# Patient Record
Sex: FEMALE | Race: BLACK OR AFRICAN AMERICAN | Employment: OTHER | URBAN - METROPOLITAN AREA
[De-identification: names, ages, dates, MRNs, and addresses within clinical notes are randomized per-mention and may not be internally consistent; named-entity substitution may affect disease eponyms.]

---

## 2017-01-12 ENCOUNTER — OP HISTORICAL/CONVERTED ENCOUNTER (OUTPATIENT)
Dept: OTHER | Age: 57
End: 2017-01-12

## 2017-07-24 ENCOUNTER — OP HISTORICAL/CONVERTED ENCOUNTER (OUTPATIENT)
Dept: OTHER | Age: 57
End: 2017-07-24

## 2017-07-25 ENCOUNTER — OP HISTORICAL/CONVERTED ENCOUNTER (OUTPATIENT)
Dept: OTHER | Age: 57
End: 2017-07-25

## 2017-10-06 ENCOUNTER — IP HISTORICAL/CONVERTED ENCOUNTER (OUTPATIENT)
Dept: OTHER | Age: 57
End: 2017-10-06

## 2018-05-26 ENCOUNTER — IP HISTORICAL/CONVERTED ENCOUNTER (OUTPATIENT)
Dept: OTHER | Age: 58
End: 2018-05-26

## 2018-10-31 ENCOUNTER — OP HISTORICAL/CONVERTED ENCOUNTER (OUTPATIENT)
Dept: OTHER | Age: 58
End: 2018-10-31

## 2019-01-07 ENCOUNTER — OP HISTORICAL/CONVERTED ENCOUNTER (OUTPATIENT)
Dept: OTHER | Age: 59
End: 2019-01-07

## 2020-02-05 ENCOUNTER — IP HISTORICAL/CONVERTED ENCOUNTER (OUTPATIENT)
Dept: OTHER | Age: 60
End: 2020-02-05

## 2020-04-08 ENCOUNTER — OP HISTORICAL/CONVERTED ENCOUNTER (OUTPATIENT)
Dept: OTHER | Age: 60
End: 2020-04-08

## 2020-08-03 ENCOUNTER — OFFICE VISIT (OUTPATIENT)
Dept: OBGYN CLINIC | Age: 60
End: 2020-08-03
Payer: COMMERCIAL

## 2020-08-03 VITALS — HEIGHT: 63 IN

## 2020-08-03 DIAGNOSIS — Z00.00 ANNUAL VISIT FOR GENERAL ADULT MEDICAL EXAMINATION WITHOUT ABNORMAL FINDINGS: ICD-10-CM

## 2020-08-03 DIAGNOSIS — G80.9 CEREBRAL PALSY, UNSPECIFIED TYPE (HCC): Primary | ICD-10-CM

## 2020-08-03 DIAGNOSIS — Z12.31 VISIT FOR SCREENING MAMMOGRAM: Primary | ICD-10-CM

## 2020-08-03 PROCEDURE — 77067 SCR MAMMO BI INCL CAD: CPT | Performed by: OBSTETRICS & GYNECOLOGY

## 2020-08-03 PROCEDURE — 99386 PREV VISIT NEW AGE 40-64: CPT | Performed by: OBSTETRICS & GYNECOLOGY

## 2020-08-03 NOTE — PROGRESS NOTES
Veena Dickson is a 64 y.o. female who presents today for the following:  Chief Complaint   Patient presents with    Well Woman     Pt presents for annual exam with no complaints //MKeeley         Allergies   Allergen Reactions    Latex, Natural Rubber Itching     Per patient: \"just regular, no anaphylaxis\"      Betaseron [Interferon Beta-1b] Rash    Tysabri [Natalizumab] Hives       No current outpatient medications on file. No current facility-administered medications for this visit. Past Medical History:   Diagnosis Date    [facility] ([facility]        Past Surgical History:   Procedure Laterality Date    HX ANKLE FRACTURE TX      HX FEMUR FRACTURE TX      HX HYSTERECTOMY         Family History   Problem Relation Age of Onset    Hypertension Mother     Ovarian Cancer Mother     Hypertension Maternal Grandmother     Ovarian Cancer Maternal Grandmother     Hypertension Maternal Grandfather     Hypertension Paternal Grandmother     Hypertension Paternal Grandfather        Social History     Socioeconomic History    Marital status:      Spouse name: Not on file    Number of children: Not on file    Years of education: Not on file    Highest education level: Not on file   Occupational History    Not on file   Social Needs    Financial resource strain: Not on file    Food insecurity     Worry: Not on file     Inability: Not on file    Transportation needs     Medical: Not on file     Non-medical: Not on file   Tobacco Use    Smoking status: Never Smoker    Smokeless tobacco: Never Used   Substance and Sexual Activity    Alcohol use:  Yes    Drug use: Not Currently    Sexual activity: Not Currently     Partners: Male   Lifestyle    Physical activity     Days per week: Not on file     Minutes per session: Not on file    Stress: Not on file   Relationships    Social connections     Talks on phone: Not on file     Gets together: Not on file     Attends Oriental orthodox service: Not on file     Active member of club or organization: Not on file     Attends meetings of clubs or organizations: Not on file     Relationship status: Not on file    Intimate partner violence     Fear of current or ex partner: Not on file     Emotionally abused: Not on file     Physically abused: Not on file     Forced sexual activity: Not on file   Other Topics Concern    Not on file   Social History Narrative    Not on file         ROS   Review of Systems   Constitutional: Negative. HENT: Negative. Eyes: Negative. Respiratory: Negative. Cardiovascular: Negative. Gastrointestinal: Negative. Genitourinary: Negative. Musculoskeletal: Negative. Skin: Negative. Neurological: Negative. Endo/Heme/Allergies: Negative. Psychiatric/Behavioral: Negative. Ht 5' 3\" (1.6 m)    OBGyn Exam   Constitutional  · Appearance: well-nourished, well developed, alert, in no acute distress    HENT  · Head and Face: appears normal    Neck  · Inspection/Palpation: normal appearance, no masses or tenderness  · Lymph Nodes: no lymphadenopathy present  · Thyroid: gland size normal, nontender, no nodules or masses present on palpation    Breasts   Symmetric, no palpable masses, no tenderness, no skin changes, no nipple abnormality, no nipple discharge, no lymphadenopathy.     Chest  · Respiratory Effort: breathing labored  · Auscultation: normal breath sounds    Cardiovascular  · Heart:  · Auscultation: regular rate and rhythm without murmur    Gastrointestinal  · Abdominal Examination: abdomen non-tender to palpation, normal bowel sounds, no masses present  · Liver and spleen: no hepatomegaly present, spleen not palpable  · Hernias: no hernias identified    Genitourinary  · External Genitalia: normal appearance for age, no discharge present, no tenderness present, no inflammatory lesions present, no masses present, no atrophy present  · Vagina: normal vaginal vault without central or paravaginal defects, no discharge present, no inflammatory lesions present, no masses present  · Bladder: non-tender to palpation  · Urethra: absent  · Cervix: normal   · Uterus: absentAdnexa: no adnexal tenderness present, no adnexal masses present  · Perineum: perineum within normal limits, no evidence of trauma, no rashes or skin lesions present  · Anus: anus within normal limits, no hemorrhoids present  · Inguinal Lymph Nodes: no lymphadenopathy present    Skin  · General Inspection: no rash, no lesions identified    Neurologic/Psychiatric  · Mental Status:  · Orientation: grossly oriented to person, place and time  · Mood and Affect: mood normal, affect appropriate    No results found for this visit on 08/03/20. Orders Placed This Encounter    PAP IG, HPV AND RFX HPV 17/81,78([id number]) ([facility])     Order Specific Question:   Pap Source? Answer:   Vaginal     Order Specific Question:   Total Hysterectomy? Answer:   Yes     Order Specific Question:   Supracervical Hysterectomy? Answer:   No     Order Specific Question:   Post Menopausal?     Answer:   Yes     Order Specific Question:   Hormone Therapy? Answer:   No     Order Specific Question:   IUD? Answer:   No     Order Specific Question:   Abnormal Bleeding? Answer:   No     Order Specific Question:   Pregnant     Answer:   No     Order Specific Question:   Post Partum? Answer:   No         1. Cerebral palsy, unspecified type ([facility]      2. Annual visit for general adult medical examination without abnormal findings  Hx of hysterectomy for benign reason  - PAP IG, HPV AND RFX HPV 85/50,82([id number])  -mammo done today      Follow-up and Dispositions    · Return in about 1 year (around 8/3/2021).

## 2020-08-08 LAB
CYTOLOGIST CVX/VAG CYTO: NORMAL
CYTOLOGY CVX/VAG DOC CYTO: NORMAL
CYTOLOGY CVX/VAG DOC THIN PREP: NORMAL
DX ICD CODE: NORMAL
HPV I/H RISK 1 DNA CVX QL PROBE+SIG AMP: NEGATIVE
Lab: NORMAL
OTHER STN SPEC: NORMAL
STAT OF ADQ CVX/VAG CYTO-IMP: NORMAL

## 2020-08-31 ENCOUNTER — TELEPHONE (OUTPATIENT)
Dept: UROLOGY | Age: 60
End: 2020-08-31

## 2020-09-17 ENCOUNTER — HOSPITAL ENCOUNTER (EMERGENCY)
Age: 60
Discharge: HOME | End: 2020-09-18
Attending: EMERGENCY MEDICINE
Payer: COMMERCIAL

## 2020-09-17 VITALS
OXYGEN SATURATION: 97 % | BODY MASS INDEX: 31.89 KG/M2 | DIASTOLIC BLOOD PRESSURE: 78 MMHG | HEIGHT: 63 IN | SYSTOLIC BLOOD PRESSURE: 138 MMHG | WEIGHT: 180 LBS | RESPIRATION RATE: 18 BRPM | TEMPERATURE: 98.2 F | HEART RATE: 86 BPM

## 2020-09-17 DIAGNOSIS — N30.00 ACUTE CYSTITIS WITHOUT HEMATURIA: Primary | ICD-10-CM

## 2020-09-17 LAB
APPEARANCE UR: ABNORMAL
BACTERIA URNS QL MICRO: NEGATIVE /HPF
BILIRUB UR QL: NEGATIVE
COLOR UR: ABNORMAL
GLUCOSE UR STRIP.AUTO-MCNC: NEGATIVE MG/DL
HGB UR QL STRIP: ABNORMAL
KETONES UR QL STRIP.AUTO: NEGATIVE MG/DL
LEUKOCYTE ESTERASE UR QL STRIP.AUTO: ABNORMAL
NITRITE UR QL STRIP.AUTO: NEGATIVE
OTHER URINE MICRO,5051: PRESENT
PH UR STRIP: 6 [PH] (ref 5–8)
PROT UR STRIP-MCNC: 100 MG/DL
RBC #/AREA URNS HPF: >100 /HPF (ref 0–5)
SP GR UR REFRACTOMETRY: 1.01 (ref 1–1.03)
UA: UC IF INDICATED,UAUC: ABNORMAL
UROBILINOGEN UR QL STRIP.AUTO: 0.1 EU/DL (ref 0.1–1)
WBC URNS QL MICRO: >100 /HPF (ref 0–4)
YEAST URNS QL MICRO: PRESENT

## 2020-09-17 PROCEDURE — 99285 EMERGENCY DEPT VISIT HI MDM: CPT

## 2020-09-17 PROCEDURE — 87086 URINE CULTURE/COLONY COUNT: CPT

## 2020-09-17 PROCEDURE — 81001 URINALYSIS AUTO W/SCOPE: CPT

## 2020-09-17 PROCEDURE — 87077 CULTURE AEROBIC IDENTIFY: CPT

## 2020-09-17 PROCEDURE — 87186 SC STD MICRODIL/AGAR DIL: CPT

## 2020-09-17 PROCEDURE — 87147 CULTURE TYPE IMMUNOLOGIC: CPT

## 2020-09-17 RX ORDER — PHENAZOPYRIDINE HYDROCHLORIDE 200 MG/1
200 TABLET, FILM COATED ORAL 3 TIMES DAILY
Qty: 6 TAB | Refills: 0 | Status: SHIPPED | OUTPATIENT
Start: 2020-09-17 | End: 2020-09-19

## 2020-09-17 RX ORDER — CEPHALEXIN 500 MG/1
500 CAPSULE ORAL 4 TIMES DAILY
Qty: 28 CAP | Refills: 0 | Status: SHIPPED | OUTPATIENT
Start: 2020-09-17 | End: 2020-09-24

## 2020-09-17 NOTE — ED TRIAGE NOTES
Pt has hx of MS and is being treated for bladder infection, pt also c/o leg  Weakness more than nml starting yesterday

## 2020-09-17 NOTE — ED PROVIDER NOTES
EMERGENCY DEPARTMENT HISTORY AND PHYSICAL EXAM      Date: 9/17/2020  Patient Name: Remigio Arroyo    History of Presenting Illness     Chief Complaint   Patient presents with    Bladder Infection       History Provided By: Patient    HPI: Remigio Arroyo, 64 y.o. female with a past medical history significant Multiple sclerosis presents to the ED with cc of dysuria and symptoms and signs of a urinary tract infection. She says these of the same symptoms that she has had in the past and also produces some level of flare in her MS. She says she is seen is exhibited as difficulty walking with decreased strength. She specifically denies any fever, chills, nausea, vomiting, chest pain, shortness of breath, rash, diarrhea, headache, night sweats, weight loss. She has not treated her symptoms with anything but wants to be evaluated in the emergency room. There are no other complaints, changes, or physical findings at this time. PCP: Sussy Camp NP    No current facility-administered medications on file prior to encounter. Current Outpatient Medications on File Prior to Encounter   Medication Sig Dispense Refill    amitriptyline (ELAVIL) 25 mg tablet Take  by mouth nightly.  bethanechol (URECHOLINE) 25 mg tablet Take  by mouth Before breakfast, lunch, and dinner.  Dalfampridine-ER 12 HR (AMPYRA) 10 mg tablet Take  by mouth every twelve (12) hours.  gabapentin (NEURONTIN) 300 mg capsule Take 300 mg by mouth three (3) times daily.  naltrexone (DEPADE) 50 mg tablet Take 50 mg by mouth daily.  ocrelizumab (Ocrevus) 30 mg/mL soln injection by IntraVENous route.  vit A/vit C/vit E/zinc/copper (PRESERVISION AREDS PO) Take  by mouth.  tamsulosin (FLOMAX) 0.4 mg capsule Take 0.4 mg by mouth daily.  tiZANidine (ZANAFLEX) 2 mg tablet Take  by mouth.  ergocalciferol (ERGOCALCIFEROL) 1,250 mcg (50,000 unit) capsule Take 50,000 Units by mouth.          Past History     Past Medical History:  Past Medical History:   Diagnosis Date    MS (multiple sclerosis) ([facility]        Past Surgical History:  Past Surgical History:   Procedure Laterality Date    HX ANKLE FRACTURE TX      HX FEMUR FRACTURE TX      HX HYSTERECTOMY         Family History:  Family History   Problem Relation Age of Onset    Hypertension Mother     Ovarian Cancer Mother     Hypertension Maternal Grandmother     Ovarian Cancer Maternal Grandmother     Hypertension Maternal Grandfather     Hypertension Paternal Grandmother     Hypertension Paternal Grandfather        Social History:  Social History     Tobacco Use    Smoking status: Never Smoker    Smokeless tobacco: Never Used   Substance Use Topics    Alcohol use: Yes    Drug use: Not Currently       Allergies: Allergies   Allergen Reactions    Latex, Natural Rubber Itching     Per patient: \"just regular, no anaphylaxis\"      Betaseron [Interferon Beta-1b] Rash    Tysabri [Natalizumab] Hives         Review of Systems     Review of Systems   Constitutional: Negative. Negative for activity change, appetite change, chills, fatigue, fever and unexpected weight change. HENT: Negative. Negative for congestion, hearing loss, rhinorrhea, sneezing and voice change. Eyes: Negative. Negative for pain and visual disturbance. Respiratory: Negative. Negative for apnea, cough, choking, chest tightness and shortness of breath. Cardiovascular: Negative. Negative for chest pain and palpitations. Gastrointestinal: Negative. Negative for abdominal distention, abdominal pain, blood in stool, diarrhea, nausea and vomiting. Genitourinary: Positive for dysuria. Negative for difficulty urinating, flank pain, frequency and urgency. No discharge   Musculoskeletal: Negative. Negative for arthralgias, back pain, myalgias and neck stiffness. Skin: Negative. Negative for color change and rash. Neurological: Negative.   Negative for dizziness, seizures, syncope, speech difficulty, weakness, numbness and headaches. Hematological: Negative for adenopathy. Psychiatric/Behavioral: Negative. Negative for agitation, behavioral problems, dysphoric mood and suicidal ideas. The patient is not nervous/anxious. Physical Exam     Physical Exam  Vitals signs and nursing note reviewed. Constitutional:       General: She is not in acute distress. Appearance: She is well-developed. HENT:      Head: Normocephalic and atraumatic. Nose: Nose normal.      Mouth/Throat:      Mouth: Mucous membranes are moist.      Pharynx: Oropharynx is clear. No oropharyngeal exudate. Eyes:      General:         Right eye: No discharge. Left eye: No discharge. Conjunctiva/sclera: Conjunctivae normal.      Pupils: Pupils are equal, round, and reactive to light. Neck:      Musculoskeletal: Normal range of motion and neck supple. Cardiovascular:      Rate and Rhythm: Normal rate and regular rhythm. Chest Wall: PMI is not displaced. No thrill. Heart sounds: Normal heart sounds. No murmur. No friction rub. No gallop. Pulmonary:      Effort: Pulmonary effort is normal. No respiratory distress. Breath sounds: Normal breath sounds. No wheezing or rales. Chest:      Chest wall: No tenderness. Abdominal:      General: Bowel sounds are normal. There is no distension. Palpations: Abdomen is soft. There is no mass. Tenderness: There is no abdominal tenderness. There is no guarding or rebound. Musculoskeletal: Normal range of motion. Lymphadenopathy:      Cervical: No cervical adenopathy. Skin:     General: Skin is warm and dry. Capillary Refill: Capillary refill takes less than 2 seconds. Findings: No erythema or rash. Neurological:      Mental Status: She is alert and oriented to person, place, and time. Cranial Nerves: No cranial nerve deficit.       Coordination: Coordination normal. Psychiatric:         Mood and Affect: Mood normal.         Behavior: Behavior normal.         Diagnostic Study Results     Labs -     Recent Results (from the past 12 hour(s))   URINALYSIS W/ REFLEX CULTURE    Collection Time: 09/17/20  2:52 PM    Specimen: Urine   Result Value Ref Range    Color Yellow/Straw      Appearance Turbid (A) Clear      Specific gravity 1.008 1.003 - 1.030      pH (UA) 6.0 5.0 - 8.0      Protein 100 (A) Negative mg/dL    Glucose Negative Negative mg/dL    Ketone Negative Negative mg/dL    Bilirubin Negative Negative      Blood Large (A) Negative      Urobilinogen 0.1 0.1 - 1.0 EU/dL    Nitrites Negative Negative      Leukocyte Esterase Large (A) Negative      UA:UC IF INDICATED Urine Culture Ordered      WBC >100 (H) 0 - 4 /hpf    RBC >100 (H) 0 - 5 /hpf    Bacteria Negative Negative /hpf    PRESUMPTIVE YEAST Present      Other Urine Micro Present         Radiologic Studies -   @lastxrresult@  CT Results  (Last 48 hours)    None        CXR Results  (Last 48 hours)    None            Medical Decision Making   I am the first provider for this patient. I reviewed the vital signs, available nursing notes, past medical history, past surgical history, family history and social history. Vital Signs-Reviewed the patient's vital signs. Patient Vitals for the past 12 hrs:   Temp Pulse Resp BP SpO2   09/17/20 1817 -- 86 18 138/78 97 %   09/17/20 1521 98.2 °F (36.8 °C) 86 18 124/75 --   09/17/20 1359 98.2 °F (36.8 °C) 87 20 128/79 97 %       Records Reviewed: Nursing Notes    Provider Notes (Medical Decision Making):   Patient is having symptoms consistent with her dysuria from urinary tract infections. Will assess with UA and provide serial exams and follow-up. ED Course:   Initial assessment performed. The patients presenting problems have been discussed, and they are in agreement with the care plan formulated and outlined with them.   I have encouraged them to ask questions as they arise throughout their visit.       6:21 PM: Patient does have urinary tract infection will treat with Keflex as an outpatient along with Pyridium. Will have patient follow-up with PCP. PROCEDURES        PLAN:  1. Current Discharge Medication List      START taking these medications    Details   cephALEXin (Keflex) 500 mg capsule Take 1 Cap by mouth four (4) times daily for 7 days. Qty: 28 Cap, Refills: 0      phenazopyridine (Pyridium) 200 mg tablet Take 1 Tab by mouth three (3) times daily for 6 doses. Qty: 6 Tab, Refills: 0           2. Follow-up Information     Follow up With Specialties Details Why Contact Info    Genny Teague NP Nurse Practitioner In 2 days  [address] [facility] [place]   [phone]          Return to ED if worse     Diagnosis     Clinical Impression:   1.  Acute cystitis without hematuria

## 2020-09-19 LAB
COLONY COUNT,CNT: ABNORMAL
CULTURE,CULT: ABNORMAL
SPECIAL REQUESTS,SREQ: ABNORMAL

## 2020-09-23 LAB
COLONY COUNT,CNT: ABNORMAL
CULTURE,CULT: ABNORMAL
CULTURE,CULT: ABNORMAL
SPECIAL REQUESTS,SREQ: ABNORMAL

## 2020-10-21 LAB — CREATININE, EXTERNAL: 2.33

## 2020-11-13 ENCOUNTER — OFFICE VISIT (OUTPATIENT)
Dept: UROLOGY | Age: 60
End: 2020-11-13
Payer: COMMERCIAL

## 2020-11-13 VITALS
BODY MASS INDEX: 30.12 KG/M2 | WEIGHT: 170 LBS | HEART RATE: 88 BPM | HEIGHT: 63 IN | DIASTOLIC BLOOD PRESSURE: 77 MMHG | SYSTOLIC BLOOD PRESSURE: 128 MMHG

## 2020-11-13 DIAGNOSIS — R31.9 URINARY TRACT INFECTION WITH HEMATURIA, SITE UNSPECIFIED: Primary | ICD-10-CM

## 2020-11-13 DIAGNOSIS — N39.0 URINARY TRACT INFECTION WITH HEMATURIA, SITE UNSPECIFIED: Primary | ICD-10-CM

## 2020-11-13 LAB
BILIRUB UR QL STRIP: NEGATIVE
GLUCOSE UR-MCNC: NEGATIVE MG/DL
KETONES P FAST UR STRIP-MCNC: NEGATIVE MG/DL
PH UR STRIP: 8 [PH] (ref 4.6–8)
PROT UR QL STRIP: NORMAL
SP GR UR STRIP: 1.02 (ref 1–1.03)
UA UROBILINOGEN AMB POC: NORMAL (ref 0.2–1)
URINALYSIS CLARITY POC: CLEAR
URINALYSIS COLOR POC: YELLOW
URINE BLOOD POC: NORMAL
URINE LEUKOCYTES POC: NORMAL
URINE NITRITES POC: NEGATIVE

## 2020-11-13 PROCEDURE — 99215 OFFICE O/P EST HI 40 MIN: CPT | Performed by: UROLOGY

## 2020-11-13 PROCEDURE — 81003 URINALYSIS AUTO W/O SCOPE: CPT | Performed by: UROLOGY

## 2020-11-13 RX ORDER — NALTREXONE HYDROCHLORIDE 50 MG/1
TABLET, FILM COATED ORAL
COMMUNITY
Start: 2020-10-05 | End: 2021-03-23 | Stop reason: SDUPTHER

## 2020-11-13 NOTE — LETTER
11/13/20 Patient: Dequan Ca YOB: 1960 Date of Visit: 11/13/2020 [address], NP 
[address] [place] [address] VIA Facsimile: [phone] [facility] Thank you for referring Ms. Becky Jack to [place] [address] for evaluation. My notes for this consultation are attached. If you have questions, please do not hesitate to call me. I look forward to following your patient along with you. Sincerely, Jesus Lopez MD

## 2020-11-13 NOTE — PROGRESS NOTES
HPI ROS PE NOTE          History of Present Illness   Chief complaint: Follow-up for neurogenic bladder, chronic urinary retention, urgency incontinence  Jhonatan Dunn is a 64 y.o. female who presents with follow-up for urinary complications of multiple sclerosis. She has had recurring urinary tract infections mostly due to chronic urinary retention incomplete emptying. She also has urinary incontinence, urgency and type. Seen in the office earlier this year the patient was in urinary retention and had an indwelling Velazco catheter which was kept in place for several weeks. She has been on bethanechol chloride 50 mg 4 times daily, she was also found to have hydronephrosis with possible ureterovesical junction obstruction at the cause. Urethral dilation was performed at the time of her cystoscopy this  was corrected with the hope that this would improve her ability to empty her bladder. She has been recently seen by Dr. Bernardo Davis nephrologist noting increasing creatinine levels concerning for further development of renal function deterioration the patient was also recently seen in the emergency room September 17, 2020 due to urinary tract infection and yeast on cultures as well as Klebsiella. Has been on tamsulosin for detrusor sphincter dyssynergia and bethanechol chloride 50 mg 4 times daily to assist .  Not sure of the reasons for unwillingness of [facility] agencies to do intermittent caths; the patient had a multidrug-resistant ESBL urinary tract infection when in the hospital in February of this year.    Past Medical History:   Diagnosis Date    Arthritis     Burning with urination     MS ([facility] sclerosis) ([facility]     Nonneurogenic neurogenic bladder dysfunction       Past Surgical History:   Procedure Laterality Date    HX ANKLE FRACTURE TX      HX FEMUR FRACTURE TX      HX HYSTERECTOMY       Family History   Problem Relation Age of Onset    Hypertension Mother     Ovarian Cancer Mother  Hypertension Maternal Grandmother     Ovarian Cancer Maternal Grandmother     Hypertension Maternal Grandfather     Hypertension Paternal Grandmother     Hypertension Paternal Grandfather       Social History     Tobacco Use    Smoking status: Never Smoker    Smokeless tobacco: Never Used   Substance Use Topics    Alcohol use: Yes     Frequency: Monthly or less       Prior to Admission medications    Medication Sig Start Date End Date Taking? Authorizing Provider   calcium carb/vit D3/minerals (Calcium Carbonate-Vit D3-Min) 600 mg (1,500 mg)-200 unit chew  2/12/20  Yes Provider, Historical   vit C/E/Zn/coppr/lutein/zeaxan (PRESERVISION AREDS-2 PO)  2/26/20  Yes Provider, Historical   naltrexone (DEPADE) 50 mg tablet TAKE ONE CAPSULE (4.5mg) BY MOUTH DAILY AT BEDTIME as directed 10/5/20  Yes Provider, Historical   bethanechol (URECHOLINE) 25 mg tablet Take  by mouth Before breakfast, lunch, and dinner. Yes Provider, Historical   Dalfampridine-ER 12 HR (AMPYRA) 10 mg tablet Take  by mouth every twelve (12) hours. Yes Provider, Historical   gabapentin (NEURONTIN) 300 mg capsule Take 300 mg by mouth three (3) times daily. Yes Provider, Historical   ocrelizumab (Ocrevus) 30 mg/mL soln injection by IntraVENous route. Yes Provider, Historical   vit A/vit C/vit E/zinc/copper (PRESERVISION AREDS PO) Take  by mouth. Yes Provider, Historical   tamsulosin (FLOMAX) 0.4 mg capsule Take 0.4 mg by mouth daily. Yes Provider, Historical   tiZANidine (ZANAFLEX) 2 mg tablet Take  by mouth. Yes Provider, Historical   ergocalciferol (ERGOCALCIFEROL) 1,250 mcg (50,000 unit) capsule Take 50,000 Units by mouth.    Yes Provider, Historical     Allergies   Allergen Reactions    Latex, Natural Rubber Itching     Per patient: \"just regular, no anaphylaxis\"      Betaseron [Interferon Beta-1b] Rash    Tysabri [Natalizumab] Hives        Review of Systems:  Constitutional: positive for fatigue and malaise  Respiratory: negative  Cardiovascular: negative  Gastrointestinal: positive for constipation  Genitourinary:positive for frequency, nocturia, urinary incontinence and recurrent nfections  Musculoskeletal:positive for myalgias and muscle weakness  Neurological: positive for paresthesia, gait problems and weakness     Physical Exam     Physical Exam:   Visit Vitals  /77   Pulse 88   Ht 5' 3\" (1.6 m)   Wt 170 lb (77.1 kg)   BMI 30.11 kg/m²     General appearance: alert, cooperative, mild distress, appears stated age, mildly obese  Head: Normocephalic, without obvious abnormality, atraumatic  Lungs: clear to auscultation bilaterally  Heart: regular rate and rhythm, S1, S2 normal, no murmur, click, rub or gallop  Abdomen: soft, non-tender. Bowel sounds normal. No masses,  no organomegaly  Extremities: extremities normal, atraumatic, no cyanosis or edema  Skin: Skin color, texture, turgor normal. No rashes or lesions  Neurologic: Sensory: reduced ; superficial pain present, proprioceptive sense reduced, pressure sense reduced  Motor:poor hip and thighs  and quadriceps femoris bilaterally  Coordination: reduced  Gait: Abnormal difficulty walking    Data Review:   Recent Results (from the past 48 hour(s))   AMB POC URINALYSIS DIP STICK AUTO W/O MICRO    Collection Time: 11/13/20 10:42 AM   Result Value Ref Range    Color (UA POC) Yellow     Clarity (UA POC) Clear     Glucose (UA POC) Negative Negative    Bilirubin (UA POC) Negative Negative    Ketones (UA POC) Negative Negative    Specific gravity (UA POC) 1.020 1.001 - 1.035    Blood (UA POC)      pH (UA POC) 8.0 4.6 - 8.0    Protein (UA POC) 3+ Negative    Urobilinogen (UA POC) 0.2 mg/dL 0.2 - 1    Nitrites (UA POC) Negative Negative    Leukocyte esterase (UA POC)       No results for input(s): 48 in the last 72 hours.       Assessment and Plan:   Neurogenic bladder, hypotonic , with chronic urinary retention, bilateral hydronephrosis on ultrasound:previously determined ureterovescial junction obstruction  elevated creatinine :LUCILA /CKD recommend repeat cystoscopy , possible repeat urethral dilation, retrograde pyelograms: attempt to place ureteral stents to overcome UVJ obstruction. Patient to schedule this procedure after today's office visit   Recurrent/chronic urinary tract infection, repeat culture and sensitivity from today's specimen, antibiotic therapy based on results  Chronic Kidney disease acute kidney injury GFR 26 cc/min  Office visit today was a 60-minute face to face extended visit with review of extensive records from Dr. Fahad Flood as well as the previous hospitalizations ultrasounds and previous urinary x-rays as well as laboratory reports emergency room reports; physical examination discussion of need for intermittent catheterization to relieve urinary retention and attempts to obtain home health support for this treatment. Finally resulting in decision to perform repeat cystoscopy to attempt to resolve her hydronephrosis apparently secondary to ureterovesical junction obstruction with the hope that stents may be able to be placed; this may be a technically difficult undertaking. Ms. Roel Basilio has a reminder for a \"due or due soon\" health maintenance. I have asked that she contact her primary care provider for follow-up on this health maintenance. Lorena Gibson M.D.  11/13/2020

## 2020-11-17 LAB
APPEARANCE UR: ABNORMAL
BACTERIA #/AREA URNS HPF: ABNORMAL /[HPF]
BACTERIA UR CULT: ABNORMAL
BACTERIA UR CULT: ABNORMAL
BILIRUB UR QL STRIP: NEGATIVE
CASTS URNS QL MICRO: ABNORMAL /LPF
COLOR UR: YELLOW
EPI CELLS #/AREA URNS HPF: ABNORMAL /HPF (ref 0–10)
GLUCOSE UR QL: NEGATIVE
HGB UR QL STRIP: ABNORMAL
KETONES UR QL STRIP: NEGATIVE
LEUKOCYTE ESTERASE UR QL STRIP: ABNORMAL
MICRO URNS: ABNORMAL
NITRITE UR QL STRIP: POSITIVE
PH UR STRIP: 6 [PH] (ref 5–7.5)
PROT UR QL STRIP: ABNORMAL
RBC #/AREA URNS HPF: >30 /HPF (ref 0–2)
SP GR UR: 1.01 (ref 1–1.03)
UROBILINOGEN UR STRIP-MCNC: 0.2 MG/DL (ref 0.2–1)
WBC #/AREA URNS HPF: >30 /HPF (ref 0–5)

## 2020-11-17 RX ORDER — NITROFURANTOIN 25; 75 MG/1; MG/1
100 CAPSULE ORAL 2 TIMES DAILY
Qty: 20 CAP | Refills: 0 | Status: SHIPPED | OUTPATIENT
Start: 2020-11-17 | End: 2020-11-27

## 2020-12-03 ENCOUNTER — HOSPITAL ENCOUNTER (OUTPATIENT)
Dept: PREADMISSION TESTING | Age: 60
Discharge: HOME | End: 2020-12-03
Payer: COMMERCIAL

## 2020-12-03 VITALS
SYSTOLIC BLOOD PRESSURE: 122 MMHG | HEIGHT: 63 IN | OXYGEN SATURATION: 99 % | BODY MASS INDEX: 30.12 KG/M2 | HEART RATE: 87 BPM | DIASTOLIC BLOOD PRESSURE: 76 MMHG | WEIGHT: 170 LBS | RESPIRATION RATE: 18 BRPM | TEMPERATURE: 98.6 F

## 2020-12-03 LAB
ANION GAP SERPL CALC-SCNC: 5 MMOL/L (ref 5–15)
BUN SERPL-MCNC: 22 MG/DL (ref 6–20)
BUN/CREAT SERPL: 12 (ref 12–20)
CA-I BLD-MCNC: 9.3 MG/DL (ref 8.5–10.1)
CHLORIDE SERPL-SCNC: 104 MMOL/L (ref 97–108)
CO2 SERPL-SCNC: 30 MMOL/L (ref 21–32)
CREAT SERPL-MCNC: 1.86 MG/DL (ref 0.55–1.02)
GLUCOSE SERPL-MCNC: 99 MG/DL (ref 65–100)
HCT VFR BLD AUTO: 28.4 % (ref 35–47)
HGB BLD-MCNC: 8.4 G/DL (ref 11.5–16)
POTASSIUM SERPL-SCNC: 4 MMOL/L (ref 3.5–5.1)
SODIUM SERPL-SCNC: 139 MMOL/L (ref 136–145)

## 2020-12-03 PROCEDURE — 85018 HEMOGLOBIN: CPT

## 2020-12-03 PROCEDURE — 87635 SARS-COV-2 COVID-19 AMP PRB: CPT

## 2020-12-03 PROCEDURE — 80048 BASIC METABOLIC PNL TOTAL CA: CPT

## 2020-12-03 PROCEDURE — 36415 COLL VENOUS BLD VENIPUNCTURE: CPT

## 2020-12-03 RX ORDER — NITROFURANTOIN (MACROCRYSTALS) 100 MG/1
100 CAPSULE ORAL 2 TIMES DAILY
COMMUNITY
End: 2020-12-07

## 2020-12-03 RX ORDER — AMITRIPTYLINE HYDROCHLORIDE 25 MG/1
25 TABLET, FILM COATED ORAL
COMMUNITY

## 2020-12-03 RX ORDER — SENNOSIDES 8.6 MG/1
1 TABLET ORAL DAILY
COMMUNITY

## 2020-12-04 ENCOUNTER — ANESTHESIA EVENT (OUTPATIENT)
Dept: SURGERY | Age: 60
End: 2020-12-04
Payer: COMMERCIAL

## 2020-12-04 LAB — SARS-COV-2, COV2: NORMAL

## 2020-12-04 NOTE — PROGRESS NOTES
Called to DR BREWER [facility] office left message regarding  Creatine 1.86  [id number]  Hgb 8.4  Hct 28.4  Eliud reviewed labs no new orders at this time.

## 2020-12-05 LAB — SARS-COV-2, COV2NT: NOT DETECTED

## 2020-12-07 ENCOUNTER — ANESTHESIA (OUTPATIENT)
Dept: SURGERY | Age: 60
End: 2020-12-07
Payer: COMMERCIAL

## 2020-12-07 ENCOUNTER — HOSPITAL ENCOUNTER (OUTPATIENT)
Dept: GENERAL RADIOLOGY | Age: 60
Discharge: HOME | End: 2020-12-07
Attending: UROLOGY

## 2020-12-07 ENCOUNTER — HOSPITAL ENCOUNTER (OUTPATIENT)
Age: 60
Discharge: HOME | End: 2020-12-07
Attending: UROLOGY | Admitting: UROLOGY
Payer: COMMERCIAL

## 2020-12-07 VITALS
DIASTOLIC BLOOD PRESSURE: 74 MMHG | HEART RATE: 73 BPM | RESPIRATION RATE: 18 BRPM | OXYGEN SATURATION: 100 % | TEMPERATURE: 97.3 F | SYSTOLIC BLOOD PRESSURE: 113 MMHG

## 2020-12-07 PROBLEM — N13.30 HYDRONEPHROSIS: Status: ACTIVE | Noted: 2020-12-07

## 2020-12-07 PROCEDURE — 74011000250 HC RX REV CODE- 250: Performed by: NURSE ANESTHETIST, CERTIFIED REGISTERED

## 2020-12-07 PROCEDURE — 76210000063 HC OR PH I REC FIRST 0.5 HR: Performed by: UROLOGY

## 2020-12-07 PROCEDURE — 74011250636 HC RX REV CODE- 250/636: Performed by: NURSE ANESTHETIST, CERTIFIED REGISTERED

## 2020-12-07 PROCEDURE — 74011250636 HC RX REV CODE- 250/636: Performed by: UROLOGY

## 2020-12-07 PROCEDURE — 76000 FLUOROSCOPY <1 HR PHYS/QHP: CPT

## 2020-12-07 PROCEDURE — C1769 GUIDE WIRE: HCPCS | Performed by: UROLOGY

## 2020-12-07 PROCEDURE — 77030020061 HC IV BLD WRMR ADMIN SET 3M -B: Performed by: ANESTHESIOLOGY

## 2020-12-07 PROCEDURE — 87186 SC STD MICRODIL/AGAR DIL: CPT

## 2020-12-07 PROCEDURE — 76060000034 HC ANESTHESIA 1.5 TO 2 HR: Performed by: UROLOGY

## 2020-12-07 PROCEDURE — C1758 CATHETER, URETERAL: HCPCS | Performed by: UROLOGY

## 2020-12-07 PROCEDURE — 76210000026 HC REC RM PH II 1 TO 1.5 HR: Performed by: UROLOGY

## 2020-12-07 PROCEDURE — 87077 CULTURE AEROBIC IDENTIFY: CPT

## 2020-12-07 PROCEDURE — C2617 STENT, NON-COR, TEM W/O DEL: HCPCS | Performed by: UROLOGY

## 2020-12-07 PROCEDURE — 76010000153 HC OR TIME 1.5 TO 2 HR: Performed by: UROLOGY

## 2020-12-07 PROCEDURE — 87086 URINE CULTURE/COLONY COUNT: CPT

## 2020-12-07 PROCEDURE — 2709999900 HC NON-CHARGEABLE SUPPLY: Performed by: UROLOGY

## 2020-12-07 PROCEDURE — 77030010509 HC AIRWY LMA MSK TELE -A: Performed by: ANESTHESIOLOGY

## 2020-12-07 PROCEDURE — 74011000636 HC RX REV CODE- 636: Performed by: UROLOGY

## 2020-12-07 PROCEDURE — 74011000250 HC RX REV CODE- 250: Performed by: UROLOGY

## 2020-12-07 PROCEDURE — 74011000272 HC RX REV CODE- 272: Performed by: UROLOGY

## 2020-12-07 DEVICE — STENT URET 6FR L24CM DBL PIGTAILS LUBRICIOUS COAT TAPR TIP: Type: IMPLANTABLE DEVICE | Site: URETER | Status: FUNCTIONAL

## 2020-12-07 RX ORDER — LIDOCAINE HYDROCHLORIDE 20 MG/ML
INJECTION, SOLUTION EPIDURAL; INFILTRATION; INTRACAUDAL; PERINEURAL AS NEEDED
Status: DISCONTINUED | OUTPATIENT
Start: 2020-12-07 | End: 2020-12-07 | Stop reason: HOSPADM

## 2020-12-07 RX ORDER — SODIUM CHLORIDE 9 MG/ML
INJECTION, SOLUTION INTRAVENOUS
Status: DISCONTINUED | OUTPATIENT
Start: 2020-12-07 | End: 2020-12-07 | Stop reason: HOSPADM

## 2020-12-07 RX ORDER — WATER 1 ML/ML
IRRIGANT IRRIGATION AS NEEDED
Status: DISCONTINUED | OUTPATIENT
Start: 2020-12-07 | End: 2020-12-07 | Stop reason: HOSPADM

## 2020-12-07 RX ORDER — SODIUM CHLORIDE 9 MG/ML
25 INJECTION, SOLUTION INTRAVENOUS CONTINUOUS
Status: DISCONTINUED | OUTPATIENT
Start: 2020-12-07 | End: 2020-12-07 | Stop reason: HOSPADM

## 2020-12-07 RX ORDER — FENTANYL CITRATE 50 UG/ML
50 INJECTION, SOLUTION INTRAMUSCULAR; INTRAVENOUS
Status: CANCELLED | OUTPATIENT
Start: 2020-12-07

## 2020-12-07 RX ORDER — PROPOFOL 10 MG/ML
INJECTION, EMULSION INTRAVENOUS AS NEEDED
Status: DISCONTINUED | OUTPATIENT
Start: 2020-12-07 | End: 2020-12-07 | Stop reason: HOSPADM

## 2020-12-07 RX ORDER — LABETALOL HCL 20 MG/4 ML
10 SYRINGE (ML) INTRAVENOUS
Status: DISCONTINUED | OUTPATIENT
Start: 2020-12-07 | End: 2020-12-07 | Stop reason: HOSPADM

## 2020-12-07 RX ORDER — LEVOFLOXACIN 5 MG/ML
500 INJECTION, SOLUTION INTRAVENOUS ONCE
Status: COMPLETED | OUTPATIENT
Start: 2020-12-07 | End: 2020-12-07

## 2020-12-07 RX ORDER — SODIUM CHLORIDE 9 MG/ML
1000 INJECTION, SOLUTION INTRAVENOUS CONTINUOUS
Status: CANCELLED | OUTPATIENT
Start: 2020-12-07

## 2020-12-07 RX ORDER — SODIUM CHLORIDE 0.9 % (FLUSH) 0.9 %
5-40 SYRINGE (ML) INJECTION EVERY 8 HOURS
Status: CANCELLED | OUTPATIENT
Start: 2020-12-07

## 2020-12-07 RX ORDER — METOPROLOL TARTRATE 5 MG/5ML
2.5 INJECTION INTRAVENOUS
Status: DISCONTINUED | OUTPATIENT
Start: 2020-12-07 | End: 2020-12-07 | Stop reason: HOSPADM

## 2020-12-07 RX ORDER — MIDAZOLAM HYDROCHLORIDE 1 MG/ML
INJECTION, SOLUTION INTRAMUSCULAR; INTRAVENOUS AS NEEDED
Status: DISCONTINUED | OUTPATIENT
Start: 2020-12-07 | End: 2020-12-07 | Stop reason: HOSPADM

## 2020-12-07 RX ORDER — ONDANSETRON 2 MG/ML
4 INJECTION INTRAMUSCULAR; INTRAVENOUS AS NEEDED
Status: CANCELLED | OUTPATIENT
Start: 2020-12-07

## 2020-12-07 RX ORDER — LIDOCAINE HYDROCHLORIDE 20 MG/ML
JELLY TOPICAL AS NEEDED
Status: DISCONTINUED | OUTPATIENT
Start: 2020-12-07 | End: 2020-12-07 | Stop reason: HOSPADM

## 2020-12-07 RX ORDER — LEVOFLOXACIN 250 MG/1
250 TABLET ORAL DAILY
Qty: 5 TAB | Refills: 0 | Status: SHIPPED | OUTPATIENT
Start: 2020-12-07 | End: 2020-12-12

## 2020-12-07 RX ORDER — SODIUM CHLORIDE 0.9 % (FLUSH) 0.9 %
5-40 SYRINGE (ML) INJECTION AS NEEDED
Status: CANCELLED | OUTPATIENT
Start: 2020-12-07

## 2020-12-07 RX ORDER — HYDROMORPHONE HYDROCHLORIDE 1 MG/ML
0.5 INJECTION, SOLUTION INTRAMUSCULAR; INTRAVENOUS; SUBCUTANEOUS
Status: CANCELLED | OUTPATIENT
Start: 2020-12-07

## 2020-12-07 RX ORDER — FENTANYL CITRATE 50 UG/ML
INJECTION, SOLUTION INTRAMUSCULAR; INTRAVENOUS AS NEEDED
Status: DISCONTINUED | OUTPATIENT
Start: 2020-12-07 | End: 2020-12-07 | Stop reason: HOSPADM

## 2020-12-07 RX ADMIN — LEVOFLOXACIN 500 MG: 5 INJECTION, SOLUTION INTRAVENOUS at 11:51

## 2020-12-07 RX ADMIN — MIDAZOLAM HYDROCHLORIDE 1 MG: 2 INJECTION, SOLUTION INTRAMUSCULAR; INTRAVENOUS at 11:30

## 2020-12-07 RX ADMIN — SODIUM CHLORIDE 25 ML/HR: 9 INJECTION, SOLUTION INTRAVENOUS at 10:30

## 2020-12-07 RX ADMIN — FENTANYL CITRATE 25 MCG: 50 INJECTION, SOLUTION INTRAMUSCULAR; INTRAVENOUS at 12:39

## 2020-12-07 RX ADMIN — LIDOCAINE HYDROCHLORIDE 100 MG: 20 INJECTION, SOLUTION EPIDURAL; INFILTRATION; INTRACAUDAL; PERINEURAL at 11:40

## 2020-12-07 RX ADMIN — PROPOFOL 150 MG: 10 INJECTION, EMULSION INTRAVENOUS at 11:40

## 2020-12-07 RX ADMIN — SODIUM CHLORIDE: 9 INJECTION, SOLUTION INTRAVENOUS at 11:30

## 2020-12-07 NOTE — ANESTHESIA POSTPROCEDURE EVALUATION
Procedure(s):  Cystourethroscopy, Urethral Dilation, Bilateral Retrograde Pyelogram  Bilateral Ureteral Stent Placement.     general    Anesthesia Post Evaluation      Multimodal analgesia: multimodal analgesia used between 6 hours prior to anesthesia start to PACU discharge  Patient location during evaluation: PACU  Patient participation: complete - patient participated  Level of consciousness: awake  Pain score: 0  Pain management: adequate  Airway patency: patent  Anesthetic complications: no  Cardiovascular status: acceptable  Respiratory status: acceptable  Hydration status: acceptable  Post anesthesia nausea and vomiting:  controlled  Final Post Anesthesia Temperature Assessment:  Normothermia (36.0-37.5 degrees C)      INITIAL Post-op Vital signs:   Vitals Value Taken Time   /85 12/7/2020  1:25 PM   Temp 36.4 °C (97.6 °F) 12/7/2020  1:15 PM   Pulse 71 12/7/2020  1:25 PM   Resp 14 12/7/2020  1:25 PM   SpO2 100 % 12/7/2020  1:25 PM

## 2020-12-07 NOTE — PROGRESS NOTES
Pt given discharged education and gave verbal understanding. Patient had own wheelchair and exited in personal wheelchair to front entrance of hospital to ride's car. Patient stable and no s/s infection.

## 2020-12-07 NOTE — BRIEF OP NOTE
Brief Postoperative Note    Patient: Elyssa Infante  YOB: 1960  MRN: [id number]    Date of Procedure: 12/7/2020     Pre-Op Diagnosis: Hydronephrosis, unspecified hydronephrosis type [N13.30]chronic urinary retention(R33.8;) CKD3) cnronicUTI    Post-Op Diagnosis: Same as preoperative diagnosis. and urethral stenosis      Procedure(s):  Cystourethroscopy, Urethral Dilation, Bilateral Retrograde Pyelogram  Bilateral Ureteral Stent Placement    Surgeon(s):  MD Osmin Sal MD    Surgical Assistant: Surg Asst-1: Kayleen Gillespie    Anesthesia: General     Estimated Blood Loss (mL): Minimal    Complications: None    Specimens:   ID Type Source Tests Collected by Time Destination   1 : Urine culture Urine Bladder CULTURE, URINE Colleen Salmeron MD 12/7/2020 1108 Microbiology        Implants:   Implant Name Type Inv. Item Serial No.  Lot No. LRB No. Used Action   STENT URET 6FR L24CM DBL PIG[employer] LU[employer] COAT TAPR TIP - SN/A  STENT URET 6FR L24CM DBL PIGTAILS LUBRICIOUS COAT TAPR TIP N/A [employer]_WD [id number] Left 1 Implanted   STENT URET 6FR L24CM DBL PIGTAILS LUBRICIOUS COAT TAPR TIP - SN/A  STENT URET 6FR L24CM DBL PIGTAILS LUBRICIOUS COAT TAPR TIP N/A [employer]_WD [id number] Right 1 Implanted       Drains: none     Findings: Mild urethral stenosis; 22 Fr, dilated to 38Fr.  Large residual urine, debris in bladder, chronic inflammation of bladder; C&S taken; bilateral hydroureteronephosis; bilateral ureteral stent stent placment  Electronically Signed by Jv Ramirez MD on 12/7/2020 at 1:13 PM

## 2020-12-07 NOTE — ANESTHESIA PREPROCEDURE EVALUATION
Relevant Problems   No relevant active problems       Anesthetic History   No history of anesthetic complications            Review of Systems / Medical History  Patient summary reviewed, nursing notes reviewed and pertinent labs reviewed    Pulmonary  Within defined limits                 Neuro/Psych         Neuromuscular disease    Comments: Multiple Sclerosis Cardiovascular  Within defined limits                     GI/Hepatic/Renal     GERD          Comments: Bladder dysfunction Endo/Other        Obesity and arthritis     Other Findings              Physical Exam    Airway  Mallampati: I  TM Distance: 4 - 6 cm  Neck ROM: normal range of motion   Mouth opening: Normal     Cardiovascular    Rhythm: regular  Rate: normal         Dental    Dentition: Lower partial plate and Upper partial plate     Pulmonary  Breath sounds clear to auscultation               Abdominal  GI exam deferred       Other Findings            Anesthetic Plan    ASA: 3  Anesthesia type: general          Induction: Intravenous  Anesthetic plan and risks discussed with: Patient

## 2020-12-07 NOTE — PROGRESS NOTES
Called Anesthesia, Dr. Karlee Easton and updated on pt temperature. Temperature is now 97.3 oral and pt can be taken off bearhugger and discharged per protocol and Anesthesia.

## 2020-12-07 NOTE — PROGRESS NOTES
Dr. Edilma Toscano notified of patient temperature  94.1 oral route and bear hugger applied at [phone]. Will continue to monitor.

## 2020-12-08 NOTE — OP NOTES
[facility]  OPERATIVE REPORT    Name:  Earl Sena  MR#:  [id number]  :  1960  ACCOUNT #:  [de-identified]  DATE OF SERVICE:  2020    HISTORY:  This is a 57-year-old female with a neurogenic bladder secondary to multiple sclerosis, who has chronic urinary tract infections, previous history of an identified ureterovesical junction obstruction with hydronephrosis, chronic kidney disease stage III with GFR of 34, recurrent and chronic urinary tract infection secondary to chronic urinary retention. She has a hypotonic bladder related to her multiple sclerosis. She is undergoing cystourethroscopy, urethral dilation, retrograde pyelograms, and stent placement. These procedures to address all of these issues. PREOPERATIVE DIAGNOSIS:  bilateral hydronephrosis, hypotonic bladder urinary retention, bladeer outlet obstruction    POSTOPERATIVE DIAGNOSIS:  CATA + urethral stenosis. PROCEDURE PERFORMED:  Cystourethroscopy, urethral dilation, bilateral retrograde pyelograms, and bilateral ureteral stent placements. SURGEON:  Jennifer Sellers MD    ASSISTANT:  Johan Gamez MD    ANESTHESIA:  General.    COMPLICATIONS:  None. SPECIMENS REMOVED:  Culture and sensitivity of the urine. IMPLANTS:  Bilateral 6-Indonesian x 24 cm double-J ureteral stents. DRAINS:  None. ESTIMATED BLOOD LOSS:  Negligible. PROCEDURE:  With the patient in the lithotomy position under satisfactory general anesthesia, sterile prep with chlorhexidine, drape in usual fashion for an endoscopic procedure was carried out. The urethra was calibrated and found to be narrowed at 22-24 Shelba Hang. Dilation to 38-Indonesian was performed with Yeny Person dilators. A large residual urine of approximately 200-300 mL was drained and specimen sent for culture and sensitivity. The 21. 5-Indonesian cystoscope was entered into the bladder and examination of the bladder revealed a bit of debris in the bladder present from chronic non-emptying. This was washed out, irrigated with several bladder fills of sterile water. Once the debris had been cleaned out, visual examination was carried out of the entire bladder surface with the 70-degree and 30-degree lenses. Chronic erythema present, but no neoplastic lesions were seen nor were there any stones seen. After completion of the visual examination, the ureters were subtle and difficult to see on the trigone. However, with persistence these openings were identified and bilateral occlusive bulb pyelograms were performed. These demonstrated bilateral hydroureteronephrosis with dilation down to the ureterovesical junction. Cannulation of the ureters was difficult, at least partially due to difficulty in visualizing them. After the retrogrades were completed demonstrating bilateral hydronephrosis, with great difficulty, ureteral stents were able to be placed. There was difficulty in accessing the right ureteral orifice. The left ureteral orifice was also difficult, but with persistence, a guidewire was able to be passed of 0.035 to the renal pelvis after which a 6-[place] Islander x 24 cm stent was placed with x-ray showing good position. Repeated attempts on the right side were unsuccessful in obtaining passage up the ureter with the guidewire. Assistance was then sought from Dr. Zohra Randall, who assisted with the use of the camera and was able to thread the guidewire to the renal pelvis after which the stent was placed. The bladder was then drained. Scopes were withdrawn. 2% lidocaine gel was instilled in the urethra for postop analgesia. The patient received 500 mg of Levaquin preoperatively for antibiotic surgical prophylaxis. She will take Levaquin 250 mg daily for 5 days postop. After completion of the x-rays and stent placement, the patient was returned to the recovery room in satisfactory condition having tolerated the procedure well.     FINAL DIAGNOSES:  Chronic urinary retention, hypotonic bladder, bladder outlet obstruction secondary to urethral stenosis, bilateral hydroureteronephrosis with chronic kidney disease stage III, acute kidney injury superimposed on chronic kidney disease, urethral stenosis. RADIOGRAPHIC READING    INDICATIONS FOR RETROGRADE PYELOGRAPHY:  History of renal insufficiency, chronic urinary tract infections, and urinary incontinence along with urinary infections. FINDINGS:  Preliminary x-rays of the abdomen and pelvis showed normal bony structures of the pelvis and lumbar spine. All soft tissues were found to be normal.  There were no calcifications seen along the course of the urinary tract. Following injection of contrast material, the ureters were normal in course and caliber. The renal pelves, infundibula, and calyces were normal bilaterally with no filling defects, stones, or renal masses indenting the collecting systems. IMPRESSION:  Normal retrograde pyelograms.       Isabella Johnson MD      [id number]  D:  12/07/2020 13:43  T:  12/07/2020 23:08  JOB #:  [id number]  CC:  Antonia Alonzo MD

## 2020-12-10 LAB
BACTERIA SPEC CULT: ABNORMAL
COLONY COUNT,CNT: ABNORMAL
SPECIAL REQUESTS,SREQ: ABNORMAL

## 2020-12-15 DIAGNOSIS — R33.9 URINARY RETENTION: Primary | ICD-10-CM

## 2020-12-15 DIAGNOSIS — N31.9 NEUROGENIC BLADDER: ICD-10-CM

## 2020-12-15 DIAGNOSIS — N39.41 URGENCY INCONTINENCE: ICD-10-CM

## 2020-12-18 ENCOUNTER — TELEPHONE (OUTPATIENT)
Dept: UROLOGY | Age: 60
End: 2020-12-18

## 2020-12-18 NOTE — TELEPHONE ENCOUNTER
[facility] called said pts insurance in 2 weeks will make her pay 30% of her visits until deductible is met so she denied home health.  They just wanted to let us know     Their number is [phone]

## 2021-03-11 ENCOUNTER — TELEPHONE (OUTPATIENT)
Dept: UROLOGY | Age: 61
End: 2021-03-11

## 2021-03-11 NOTE — TELEPHONE ENCOUNTER
Thought we called her today but I saw not phone call message and scheduled her an appt for a FU from bilateral stent placement in 12/2020.

## 2021-03-15 DIAGNOSIS — N13.30 HYDRONEPHROSIS, UNSPECIFIED HYDRONEPHROSIS TYPE: Primary | ICD-10-CM

## 2021-03-22 PROBLEM — R52 PAIN: Status: ACTIVE | Noted: 2021-03-22

## 2021-03-22 PROBLEM — R53.1 WEAKNESS: Status: ACTIVE | Noted: 2021-03-22

## 2021-03-22 PROBLEM — B02.9 SHINGLES: Status: ACTIVE | Noted: 2021-03-22

## 2021-03-22 PROBLEM — Z90.710 H/O: HYSTERECTOMY: Status: ACTIVE | Noted: 2021-03-22

## 2021-03-22 PROBLEM — G35 MULTIPLE SCLEROSIS (HCC): Status: ACTIVE | Noted: 2021-03-22

## 2021-03-22 PROBLEM — H35.30 MACULAR DEGENERATION, RIGHT EYE: Status: ACTIVE | Noted: 2021-03-22

## 2021-03-22 PROBLEM — T14.8XXA FRACTURE: Status: ACTIVE | Noted: 2021-03-22

## 2021-03-23 ENCOUNTER — OFFICE VISIT (OUTPATIENT)
Dept: UROLOGY | Age: 61
End: 2021-03-23
Payer: COMMERCIAL

## 2021-03-23 VITALS
WEIGHT: 170 LBS | RESPIRATION RATE: 12 BRPM | HEIGHT: 63 IN | SYSTOLIC BLOOD PRESSURE: 118 MMHG | TEMPERATURE: 97.8 F | BODY MASS INDEX: 30.12 KG/M2 | OXYGEN SATURATION: 99 % | HEART RATE: 88 BPM | DIASTOLIC BLOOD PRESSURE: 74 MMHG

## 2021-03-23 DIAGNOSIS — N13.1 HYDRONEPHROSIS DUE TO OBSTRUCTION OF URETER: Primary | ICD-10-CM

## 2021-03-23 LAB
BILIRUB UR QL STRIP: NEGATIVE
GLUCOSE UR-MCNC: NEGATIVE MG/DL
KETONES P FAST UR STRIP-MCNC: NEGATIVE MG/DL
PH UR STRIP: 7 [PH] (ref 4.6–8)
PROT UR QL STRIP: NORMAL
SP GR UR STRIP: 1.01 (ref 1–1.03)
UA UROBILINOGEN AMB POC: NORMAL (ref 0.2–1)
URINALYSIS CLARITY POC: CLEAR
URINALYSIS COLOR POC: YELLOW
URINE BLOOD POC: NORMAL
URINE LEUKOCYTES POC: NORMAL
URINE NITRITES POC: NEGATIVE

## 2021-03-23 PROCEDURE — 99214 OFFICE O/P EST MOD 30 MIN: CPT | Performed by: UROLOGY

## 2021-03-23 PROCEDURE — 81003 URINALYSIS AUTO W/O SCOPE: CPT | Performed by: UROLOGY

## 2021-03-23 NOTE — PROGRESS NOTES
Chief Complaint   Patient presents with    Follow-up     STENT PLACEMENT     1. Have you been to the ER, urgent care clinic since your last visit? Hospitalized since your last visit? Yes When: 12/7/20 Where: [facility] for stent placement urethra Reason for visit: for stent placement urethra    2. Have you seen or consulted any other health care providers outside of the [address] [place] [address] since your last visit? Include any pap smears or colon screening.  No  Visit Vitals  /84 (BP 1 Location: Right upper arm, BP Patient Position: Sitting, BP Cuff Size: Adult)   Pulse 88   Temp 97.8 °F (36.6 °C) (Temporal)   Resp 12   Ht 5' 3\" (1.6 m)   Wt 170 lb (77.1 kg)   LMP  (LMP Unknown)   SpO2 99%   BMI 30.11 kg/m²

## 2021-03-23 NOTE — LETTER
3/23/2021 Patient: Shawn Rodriguez YOB: 1960 Date of Visit: 3/23/2021 [address], [place] 
[address] [place] [id number] [address] Fax: [phone] Dear [address] Michael, Thank you for referring Ms. Brandin Escobar to [place] [address] for evaluation. My notes for this consultation are attached. If you have questions, please do not hesitate to call me. I look forward to following your patient along with you. Sincerely, Hafsa Castillo MD

## 2021-03-23 NOTE — PROGRESS NOTES
HPI ROS PE NOTE          History of Present Illness   Chief complaint: Follow-up for hydronephrosis and bilateral stent placement  Opal Nj is a 64 y.o. female who presents with neurogenic bladder secondary to multiple sclerosis with previous history of urinary retention along with bilateral hydronephrosis treated with bilateral stent placement December 7, 2020 by me with assistance from Dr Bangura. Patient has longstanding multiple sclerosis and has had incomplete bladder emptying with recurrent urinary tract infections as a result. At the time of her cystoscopy urethral dilation was performed to assist in improving her bladder emptying. She thinks that her bladder emptying is working significantly better since her procedure. She has also had chronic urinary tract infections,. Patient has reported that she has had some difficulty with her ambulation and was referred to a neurologist at [address] [place] [address], Dr. Jina Moreno; an MRI of her cervical spine and the head was apparently performed 2 weeks ago at [facility], [address] [place] [address].  Denies dysuria or hematuria at the present time.   Past Medical History:   Diagnosis Date    Arthritis     Burning with urination     GERD (gastroesophageal reflux disease)     MS (multiple sclerosis) (HCC)     Nonneurogenic neurogenic bladder dysfunction     S/P ureteral stent placement       Past Surgical History:   Procedure Laterality Date    HX ANKLE FRACTURE TX      HX FEMUR FRACTURE TX      HX HYSTERECTOMY      HX UROLOGICAL  12/07/2020    Cystourethroscop    HX UROLOGICAL  12/07/2020     urethral dilation    HX UROLOGICAL  12/07/2020    bilateral retrograde pyelograms    HX UROLOGICAL Bilateral 12/07/2020    ureteral stent placements     Family History   Problem Relation Age of Onset    Hypertension Mother     Ovarian Cancer Mother     Hypertension Maternal Grandmother     Ovarian Cancer Maternal Grandmother     Hypertension Maternal Grandfather  Hypertension Paternal Grandmother     Hypertension Paternal Grandfather     Hypertension Father       Social History     Tobacco Use    Smoking status: Never Smoker    Smokeless tobacco: Never Used   Substance Use Topics    Alcohol use: Yes     Frequency: Monthly or less     Comment: occasionally       Prior to Admission medications    Medication Sig Start Date End Date Taking? Authorizing Provider   NALTREXONE TAKE ONE CAPSULE (4.5mg) BY MOUTH DAILY AT BEDTIME as directed 2/2/21  Yes Provider, Historical   amitriptyline (ELAVIL) 25 mg tablet Take 25 mg by mouth nightly. Yes Provider, Historical   senna (Senna) 8.6 mg tablet Take 1 Tab by mouth daily. Yes Provider, Historical   calcium carb/vit D3/minerals (Calcium Carbonate-Vit D3-Min) 600 mg (1,500 mg)-200 unit chew  2/12/20  Yes Provider, Historical   bethanechol (URECHOLINE) 25 mg tablet Take  by mouth Before breakfast, lunch, and dinner. Yes Provider, Historical   Dalfampridine-ER 12 HR (AMPYRA) 10 mg tablet Take  by mouth every twelve (12) hours. Yes Provider, Historical   gabapentin (NEURONTIN) 300 mg capsule Take 300 mg by mouth three (3) times daily. Yes Provider, Historical   ocrelizumab (Ocrevus) 30 mg/mL soln injection by IntraVENous route. Yes Provider, Historical   vit A/vit C/vit E/zinc/copper (PRESERVISION AREDS PO) Take  by mouth. Yes Provider, Historical   tamsulosin (FLOMAX) 0.4 mg capsule Take 0.4 mg by mouth daily. Yes Provider, Historical   tiZANidine (ZANAFLEX) 2 mg tablet Take  by mouth. Yes Provider, Historical     Allergies   Allergen Reactions    Latex, Natural Rubber Itching     Per patient: \"just regular, no anaphylaxis\"      Betaseron [Interferon Beta-1b] Rash    Tysabri [Natalizumab] Hives        Review of Systems:  A comprehensive review of systems was negative except for that written in the History of Present Illness.      Physical Exam     Physical Exam:   Visit Vitals  /74 (BP 1 Location: Right upper arm, BP Patient Position: Sitting, BP Cuff Size: Adult)   Pulse 88   Temp 97.8 °F (36.6 °C) (Temporal)   Resp 12   Ht 5' 3\" (1.6 m)   Wt 170 lb (77.1 kg)   LMP  (LMP Unknown)   SpO2 99%   BMI 30.11 kg/m²     General appearance: alert, cooperative, no distress, appears stated age  Head: Normocephalic, without obvious abnormality, atraumatic  Nose: Nares normal. Septum midline. Mucosa normal. No drainage or sinus tenderness. Back: symmetric, no curvature. ROM normal. No CVA tenderness. Lungs: clear to auscultation bilaterally  Heart: regular rate and rhythm, S1, S2 normal, no murmur, click, rub or gallop  Abdomen: soft, non-tender. Bowel sounds normal. No masses,  no organomegaly  Extremities: extremities normal, atraumatic, no cyanosis or edema  Pulses: 2+ and symmetric  Skin: Skin color, texture, turgor normal. No rashes or lesions  Neurologic: Coordination: Abnormal  Gait: In wheelchair currently but does ambulate with difficult    Data Review:   Recent Results (from the past 48 hour(s))   AMB POC URINALYSIS DIP STICK AUTO W/O MICRO    Collection Time: 03/23/21  1:15 PM   Result Value Ref Range    Color (UA POC) Yellow     Clarity (UA POC) Clear     Glucose (UA POC) Negative Negative    Bilirubin (UA POC) Negative Negative    Ketones (UA POC) Negative Negative    Specific gravity (UA POC) 1.015 1.001 - 1.035    Blood (UA POC)      pH (UA POC) 7.0 4.6 - 8.0    Protein (UA POC) 1+ Negative    Urobilinogen (UA POC) 0.2 mg/dL 0.2 - 1    Nitrites (UA POC) Negative Negative    Leukocyte esterase (UA POC)       No results for input(s): 48 in the last 72 hours.       Assessment and Plan:   Neurogenic bladder, recurrent urinary tract infections, perform culture and sensitivity of the urine from today's specimen; continue bethanechol chloride 50 mg 4 times daily and tamsulosin 0.4 mg daily for detrusor sphincter dyssynergia and hypotonic bladder  Bilateral hydronephrosis by history, status post placement of indwelling ureteral stents, recommend ultrasound of the kidneys and bladder to assess resolution of hydronephrosis; also KUB to be done to assess stent positioning. Follow-up for stents in approximately 3 months consideration of stent exchange going forward at 3 to 6 months. Microscopic hematuria and pyuria concerning for urinary tract infection, await culture report for further treatment  Proteinuria indicative of chronic renal disease, continue follow-up with Dr. Elisabeth Hoover her nephrologist    Ms. Sindy Kyle has a reminder for a \"due or due soon\" health maintenance. I have asked that she contact her primary care provider for follow-up on this health maintenance. Julius Mojica M.D.  3/23/2021

## 2021-03-25 RX ORDER — TAMSULOSIN HYDROCHLORIDE 0.4 MG/1
CAPSULE ORAL
Qty: 90 CAP | Refills: 3 | Status: SHIPPED | OUTPATIENT
Start: 2021-03-25 | End: 2021-09-01 | Stop reason: SDUPTHER

## 2021-03-26 LAB
APPEARANCE UR: ABNORMAL
BACTERIA #/AREA URNS HPF: ABNORMAL /[HPF]
BACTERIA UR CULT: ABNORMAL
BACTERIA UR CULT: ABNORMAL
BILIRUB UR QL STRIP: NEGATIVE
CASTS URNS MICRO: ABNORMAL
CASTS URNS QL MICRO: PRESENT /LPF
COLOR UR: YELLOW
EPI CELLS #/AREA URNS HPF: ABNORMAL /HPF (ref 0–10)
GLUCOSE UR QL: NEGATIVE
HGB UR QL STRIP: ABNORMAL
KETONES UR QL STRIP: NEGATIVE
LEUKOCYTE ESTERASE UR QL STRIP: ABNORMAL
MICRO URNS: ABNORMAL
NITRITE UR QL STRIP: NEGATIVE
PH UR STRIP: 7 [PH] (ref 5–7.5)
PROT UR QL STRIP: ABNORMAL
RBC #/AREA URNS HPF: ABNORMAL /HPF (ref 0–2)
SP GR UR: 1.01 (ref 1–1.03)
UROBILINOGEN UR STRIP-MCNC: 0.2 MG/DL (ref 0.2–1)
WBC #/AREA URNS HPF: >30 /HPF (ref 0–5)

## 2021-04-02 DIAGNOSIS — N13.1 HYDRONEPHROSIS DUE TO OBSTRUCTION OF URETER: ICD-10-CM

## 2021-04-09 DIAGNOSIS — N13.1 HYDRONEPHROSIS DUE TO OBSTRUCTION OF URETER: ICD-10-CM

## 2021-05-06 ENCOUNTER — TELEPHONE (OUTPATIENT)
Dept: UROLOGY | Age: 61
End: 2021-05-06

## 2021-05-06 NOTE — TELEPHONE ENCOUNTER
Never self cath due to her MS. Wants a nurse to come to her home and self cath her. Says Dr Jody Portillo said he found someone to come do this for her and wants to find out if we can contact [facility] . I let the her know if she found it who her ins would pay for.

## 2021-05-06 NOTE — TELEPHONE ENCOUNTER
We can provide her with a sample pack or if she needs orders for a brand that she is already using, the [facility] agency should fax over a request for refill.

## 2021-05-06 NOTE — TELEPHONE ENCOUNTER
PT LVM about needing straight Cath for home health care. Going through her notes I saw no mention this year of self cath. We can offer her a sample pack if you want until she see ramón Blevins as a new PT . Last seen by Shyam Terry on 3/11/2021 (see his notes for his plan).

## 2021-05-20 PROBLEM — Z96.0 RETAINED URETERAL STENT: Status: ACTIVE | Noted: 2021-05-20

## 2021-05-20 PROBLEM — N31.9 NEUROGENIC BLADDER: Status: ACTIVE | Noted: 2021-05-20

## 2021-05-20 PROBLEM — N30.00 ACUTE CYSTITIS WITHOUT HEMATURIA: Status: ACTIVE | Noted: 2021-05-20

## 2021-05-20 PROBLEM — N18.9 CKD (CHRONIC KIDNEY DISEASE): Status: ACTIVE | Noted: 2021-05-20

## 2021-05-20 PROBLEM — N32.3 BLADDER DIVERTICULUM: Status: ACTIVE | Noted: 2021-05-20

## 2021-05-21 ENCOUNTER — OFFICE VISIT (OUTPATIENT)
Dept: UROLOGY | Age: 61
End: 2021-05-21
Payer: COMMERCIAL

## 2021-05-21 VITALS
HEART RATE: 89 BPM | RESPIRATION RATE: 22 BRPM | BODY MASS INDEX: 30.12 KG/M2 | TEMPERATURE: 96.5 F | WEIGHT: 170 LBS | OXYGEN SATURATION: 98 % | SYSTOLIC BLOOD PRESSURE: 112 MMHG | DIASTOLIC BLOOD PRESSURE: 67 MMHG | HEIGHT: 63 IN

## 2021-05-21 DIAGNOSIS — Z96.0 RETAINED URETERAL STENT: ICD-10-CM

## 2021-05-21 DIAGNOSIS — N39.46 MIXED STRESS AND URGE URINARY INCONTINENCE: ICD-10-CM

## 2021-05-21 DIAGNOSIS — N32.3 BLADDER DIVERTICULUM: ICD-10-CM

## 2021-05-21 DIAGNOSIS — N30.00 ACUTE CYSTITIS WITHOUT HEMATURIA: ICD-10-CM

## 2021-05-21 DIAGNOSIS — N13.30 HYDRONEPHROSIS, UNSPECIFIED HYDRONEPHROSIS TYPE: Primary | ICD-10-CM

## 2021-05-21 DIAGNOSIS — N18.9 CHRONIC KIDNEY DISEASE, UNSPECIFIED CKD STAGE: ICD-10-CM

## 2021-05-21 DIAGNOSIS — N31.9 NEUROGENIC BLADDER: ICD-10-CM

## 2021-05-21 DIAGNOSIS — G35 MULTIPLE SCLEROSIS (HCC): ICD-10-CM

## 2021-05-21 LAB
BILIRUB UR QL STRIP: NEGATIVE
GLUCOSE UR-MCNC: NEGATIVE MG/DL
KETONES P FAST UR STRIP-MCNC: NEGATIVE MG/DL
PH UR STRIP: 6.5 [PH] (ref 4.6–8)
PROT UR QL STRIP: NORMAL
SP GR UR STRIP: 1.02 (ref 1–1.03)
UA UROBILINOGEN AMB POC: NORMAL (ref 0.2–1)
URINALYSIS CLARITY POC: NORMAL
URINALYSIS COLOR POC: YELLOW
URINE BLOOD POC: NORMAL
URINE LEUKOCYTES POC: NORMAL
URINE NITRITES POC: NEGATIVE

## 2021-05-21 PROCEDURE — 81003 URINALYSIS AUTO W/O SCOPE: CPT | Performed by: UROLOGY

## 2021-05-21 PROCEDURE — 99214 OFFICE O/P EST MOD 30 MIN: CPT | Performed by: UROLOGY

## 2021-05-21 RX ORDER — NITROFURANTOIN 25; 75 MG/1; MG/1
100 CAPSULE ORAL 2 TIMES DAILY
Qty: 28 CAPSULE | Refills: 0 | Status: SHIPPED | OUTPATIENT
Start: 2021-05-21 | End: 2021-06-22 | Stop reason: ALTCHOICE

## 2021-05-21 NOTE — PROGRESS NOTES
1. Have you been to the ER, urgent care clinic since your last visit? Hospitalized since your last visit? No    2. Have you seen or consulted any other health care providers outside of the [address] [place] [address] since your last visit? Include any pap smears or colon screening.  No  Chief Complaint   Patient presents with    New Patient    Hydronephrosis    Neurogenic Bladder     Visit Vitals  /67 (BP 1 Location: Right arm, BP Patient Position: Sitting, BP Cuff Size: Adult)   Pulse 89   Temp (!) 96.5 °F (35.8 °C) (Temporal)   Resp 22   Ht 5' 3\" (1.6 m)   Wt 170 lb (77.1 kg)   LMP  (LMP Unknown)   SpO2 98%   BMI 30.11 kg/m²

## 2021-05-21 NOTE — ASSESSMENT & PLAN NOTE
Dr. Maicol Whyte has managed her with ureteral stents, placed 12/2020. She has persistent hydro on US with CKD, MS, and a bladder diverticulum. We will plan cysto/ stent changes. Cystogram, BRP at the time.

## 2021-05-21 NOTE — PROGRESS NOTES
HISTORY OF PRESENT ILLNESS  Isadora Cespedes is a 63 y.o. female. Chief Complaint   Patient presents with    New Patient    Hydronephrosis    Neurogenic Bladder     She has a neurogenic bladder secondary to multiple sclerosis with previous history of urinary retention along with bilateral hydronephrosis treated with bilateral stent placement on 12/7/2020 by Dr. Razia Ashby with assistance from Dr. eNgra Albright. US 4/7/21 notes an anechoic fluid collection located to the left of the bladder apparently representing a moderately large bladder diverticulum and bilateral ureteral stents with moderate hydronephrosis despite the placement of stents ([id number]). She notes no stent discomfort. She denies flank pain, dysuria or gross hematuria. She also has a history of UTI. UA dip today with leukocytes and blood. No nitrites. 9/17/20: cultrue with Streptococci, beta hemolyticgroup B(predominating) and E coli, greater than 100,000 colony forming units per mL.      11/13/20: culture with Citrobacter freundii,  greater than 100,000 colony forming units per mL.      12/7/21: culture with Pseudomonas aeruginosa, greater than 100,000 colony forming units per mL.     3/21/21: culture with Beta hemolytic Streptococcus, group B, greater than 100,000 colony forming units per mL. She was treated with a ramírez and antibiotics. She has a neurogenic bladder with hydronephrosis. She has a bladder diverticulum. She was started on bethanechol and tamsulosin early 2020. She is improve on that. She leaks with standing and urge. She can strain. Her urine is cloudy today. She denies dysuria. She uses 2 pads per day. Chronic Conditions Addressed Today     1. Hydronephrosis - Primary     Overview      US done on 4/7/21 reads bilateral ureteral stents with moderate hydronephrosis despite the placement of stents ([id number]).            2. Neurogenic bladder     Overview      She has a neurogenic bladder secondary to multiple sclerosis with previous history of urinary retention along with bilateral hydronephrosis treated with bilateral stent placement on 12/7/2020 by Dr. Cuong Zheng with assistance from Dr. Mary Clayton. 3. Bladder diverticulum     Overview      US 4/7/21 notes an anechoic fluid collection located to the left of the bladder apparently representing a moderately large bladder diverticulum. 4. Acute cystitis without hematuria     Overview      9/17/20: cultrue with Streptococci, beta hemolyticgroup B(predominating) and E coli, greater than 100,000 colony forming units per mL.     11/13/20: culture with Citrobacter freundii,  greater than 100,000 colony forming units per mL.     12/7/21: culture with Pseudomonas aeruginosa, greater than 100,000 colony forming units per mL.    3/21/21: culture with Beta hemolytic Streptococcus, group B, greater than 100,000 colony forming units per mL. 5. Retained ureteral stent     Overview      12/7/2020 bilateral ureteral stent placement by Dr. Marella Runner with assistance from Dr. Cindy Soler. 6. CKD (chronic kidney disease)     Overview      CKD, followed by Dr. Cynthia Lebron, Nephrology. 12/3/2020 Cr was 1.86               Patient denies the symptoms of COVID-19 per routine screening guidelines. ROS    Past Medical History:  PMHx (including negatives):  has a past medical history of Arthritis, Burning with urination, GERD (gastroesophageal reflux disease), MS (multiple sclerosis) ([facility] [address] Nonneurogenic neurogenic bladder dysfunction, and S/P ureteral stent placement. PSurgHx:  has a past surgical history that includes hx hysterectomy; hx ankle fracture tx; hx femur fracture tx; hx urological (12/07/2020); hx urological (12/07/2020); hx urological (12/07/2020); and hx urological (Bilateral, 12/07/2020). PSocHx:  reports that she has never smoked. She has never used smokeless tobacco. She reports current alcohol use.  She reports that she does not use drugs.     Physical Exam  Vitals reviewed. Constitutional:       General: She is not in acute distress. Appearance: Normal appearance. She is obese. She is not ill-appearing, toxic-appearing or diaphoretic. HENT:      Head: Normocephalic and atraumatic. Mouth/Throat:      Mouth: Mucous membranes are moist.      Pharynx: Oropharynx is clear. Eyes:      Extraocular Movements: Extraocular movements intact. Conjunctiva/sclera: Conjunctivae normal.      Pupils: Pupils are equal, round, and reactive to light. Cardiovascular:      Rate and Rhythm: Normal rate and regular rhythm. Pulmonary:      Effort: Pulmonary effort is normal. No respiratory distress. Breath sounds: Normal breath sounds. Abdominal:      General: Bowel sounds are normal.      Palpations: Abdomen is soft. Musculoskeletal:         General: No swelling or deformity. Normal range of motion. Cervical back: Normal range of motion. Lymphadenopathy:      Cervical: No cervical adenopathy. Upper Body:      Right upper body: No supraclavicular adenopathy. Left upper body: No supraclavicular adenopathy. Skin:     General: Skin is warm and dry. Neurological:      General: No focal deficit present. Mental Status: She is alert and oriented to person, place, and time. Gait: Abnormal gait: in a wheel chair, she can transfer. Uses a walker. Psychiatric:         Mood and Affect: Mood normal.         Behavior: Behavior normal.         ASSESSMENT and PLAN  Diagnoses and all orders for this visit:    1. Hydronephrosis, unspecified hydronephrosis type  Assessment & Plan:   Dr. Concha Arciniega has managed her with ureteral stents, placed 12/2020. She has persistent hydro on US with CKD, MS, and a bladder diverticulum. We will plan cysto/ stent changes. Cystogram, BRP at the time. 2. Retained ureteral stent    3. Neurogenic bladder    4.  Acute cystitis without hematuria  Assessment & Plan:   Start Macrobid x 2 weeks. 5. Bladder diverticulum  Assessment & Plan:   Assess at cystoscopy, cystogram        6. Chronic kidney disease, unspecified CKD stage  Assessment & Plan:  Cr 3.8 4/26/21, US with persistent hydro. Plan stent changes. 7. Mixed stress and urge urinary incontinence  Assessment & Plan:  She manages with pads. 8. Multiple sclerosis ([facility] [address]  Assessment & Plan:  Neurogenic bladder  She has progressive disease since 1995.        Other orders  -     AMB POC URINALYSIS DIP STICK AUTO W/O MICRO             Lashawn Stinson MD

## 2021-05-26 LAB
BACTERIA UR CULT: ABNORMAL
BACTERIA UR CULT: ABNORMAL

## 2021-05-27 RX ORDER — NITROFURANTOIN 25; 75 MG/1; MG/1
100 CAPSULE ORAL 2 TIMES DAILY
Qty: 14 CAPSULE | Refills: 0 | Status: SHIPPED | OUTPATIENT
Start: 2021-05-27 | End: 2021-06-22 | Stop reason: ALTCHOICE

## 2021-06-22 ENCOUNTER — OFFICE VISIT (OUTPATIENT)
Dept: UROLOGY | Age: 61
End: 2021-06-22
Payer: COMMERCIAL

## 2021-06-22 VITALS
WEIGHT: 170 LBS | SYSTOLIC BLOOD PRESSURE: 115 MMHG | DIASTOLIC BLOOD PRESSURE: 70 MMHG | HEART RATE: 86 BPM | OXYGEN SATURATION: 100 % | HEIGHT: 63 IN | BODY MASS INDEX: 30.12 KG/M2

## 2021-06-22 DIAGNOSIS — G35 MULTIPLE SCLEROSIS (HCC): ICD-10-CM

## 2021-06-22 DIAGNOSIS — Z96.0 RETAINED URETERAL STENT: ICD-10-CM

## 2021-06-22 DIAGNOSIS — N30.00 ACUTE CYSTITIS WITHOUT HEMATURIA: ICD-10-CM

## 2021-06-22 DIAGNOSIS — N31.9 NEUROGENIC BLADDER: ICD-10-CM

## 2021-06-22 DIAGNOSIS — N13.30 HYDRONEPHROSIS, UNSPECIFIED HYDRONEPHROSIS TYPE: Primary | ICD-10-CM

## 2021-06-22 DIAGNOSIS — N39.46 MIXED STRESS AND URGE URINARY INCONTINENCE: ICD-10-CM

## 2021-06-22 DIAGNOSIS — N18.9 CHRONIC KIDNEY DISEASE, UNSPECIFIED CKD STAGE: ICD-10-CM

## 2021-06-22 DIAGNOSIS — N32.3 BLADDER DIVERTICULUM: ICD-10-CM

## 2021-06-22 PROCEDURE — 81003 URINALYSIS AUTO W/O SCOPE: CPT | Performed by: UROLOGY

## 2021-06-22 PROCEDURE — 99213 OFFICE O/P EST LOW 20 MIN: CPT | Performed by: UROLOGY

## 2021-06-22 NOTE — PROGRESS NOTES
HISTORY OF PRESENT ILLNESS  Tyler Leon is a 63 y.o. female. Chief Complaint   Patient presents with    Post OP Follow Up    Hydronephrosis    Neurogenic Bladder    Stress Incontinence     She is 63years old with MS. She is managed with bilateral ureteral stents for hydronephrosis. She is s/p ureteral stent changes 5/27/21 at [facility]. She feels better after her course of abx. CKD. She sees Dr. Nelson Camp. Chronic Conditions Addressed Today     1. Hydronephrosis - Primary     Overview      US done on 4/7/21 reads bilateral ureteral stents with moderate hydronephrosis despite the placement of stents ([id number]). 2. Multiple sclerosis ([address]    3. Neurogenic bladder     Overview      She has a neurogenic bladder secondary to multiple sclerosis with previous history of urinary retention along with bilateral hydronephrosis treated with bilateral stent placement on 12/7/2020 by Dr. Va Dowling with assistance from Dr. Pina Cisneros. Current Assessment & Plan       Her stents were changed 5/27/21. Plan on 6m stent changes. 4. Bladder diverticulum     Overview      US 4/7/21 notes an anechoic fluid collection located to the left of the bladder apparently representing a moderately large bladder diverticulum. 5. Acute cystitis without hematuria     Overview      9/17/20: cultrue with Streptococci, beta hemolyticgroup B(predominating) and E coli, greater than 100,000 colony forming units per mL.     11/13/20: culture with Citrobacter freundii,  greater than 100,000 colony forming units per mL.     12/7/21: culture with Pseudomonas aeruginosa, greater than 100,000 colony forming units per mL.    3/21/21: culture with Beta hemolytic Streptococcus, group B, greater than 100,000 colony forming units per mL.    5/21/21: culture with Beta hemolytic Streptococcus, group B, greater than 100,000 colony forming units per mL. She was prescribed Macrobid x 2 weeks.           Current Assessment & Plan       She is s/p ureteral stent changes. She may have chronic colonization and would have trouble with clean catch specimen collection. She feels improved after her course of Macrobid          Relevant Orders     CULTURE, URINE     URINALYSIS W/MICROSCOPIC     AMB POC URINALYSIS DIP STICK AUTO W/O MICRO (Completed)    6. Retained ureteral stent     Overview      12/7/2020 bilateral ureteral stent placement by Dr. Aníbal Fine with assistance from Dr. Vesta Montanez. Current Assessment & Plan      Stents changed 5/27/21. She was treated with macrobid 5/27/21. Relevant Orders     CULTURE, URINE     URINALYSIS W/MICROSCOPIC     AMB POC URINALYSIS DIP STICK AUTO W/O MICRO (Completed)    7. CKD (chronic kidney disease)     Overview      CKD, followed by Dr. Claudia Randolph, Nephrology. 12/3/2020 Cr was 1.86  4/26/21 Cr was 3.8          Current Assessment & Plan       Dr. Claudia Randolph is following her function. I recommend she clarify the follow up with him. BMP today. 8. Mixed stress and urge urinary incontinence     Overview      5/21/21: NGB secondary to MS. Manages with pads, changes them 2 times/day. Current Assessment & Plan       She is stable with pad usage. Relevant Orders     CULTURE, URINE     URINALYSIS W/MICROSCOPIC     AMB POC URINALYSIS DIP STICK AUTO W/O MICRO (Completed)          Past Medical History:    PMHx (including negatives):  has a past medical history of Arthritis, Burning with urination, GERD (gastroesophageal reflux disease), MS (multiple sclerosis) ([address] Nonneurogenic neurogenic bladder dysfunction, and S/P ureteral stent placement. PSurgHx:  has a past surgical history that includes hx hysterectomy; hx ankle fracture tx; hx femur fracture tx; hx urological (12/07/2020); hx urological (12/07/2020); hx urological (12/07/2020); and hx urological (Bilateral, 12/07/2020). PSocHx:  reports that she has never smoked.  She has never used smokeless tobacco. She reports current alcohol use. She reports that she does not use drugs. ROS  Physical Exam  Allergies   Allergen Reactions    Latex, Natural Rubber Itching     Per patient: \"just regular, no anaphylaxis\"      Betaseron [Interferon Beta-1b] Rash    Tysabri [Natalizumab] Hives      Prior to Admission medications    Medication Sig Start Date End Date Taking? Authorizing Provider   tamsulosin (FLOMAX) 0.4 mg capsule TAKE 1 CAPSULE EVERY DAY BY ORAL ROUTE AFTER MEALS 3/25/21  Yes Jordan Shah MD   NALTREXONE TAKE ONE CAPSULE (4.5mg) BY MOUTH DAILY AT BEDTIME as directed 2/2/21  Yes Provider, Historical   amitriptyline (ELAVIL) 25 mg tablet Take 25 mg by mouth nightly. Yes Provider, Historical   senna (Senna) 8.6 mg tablet Take 1 Tab by mouth daily. Yes Provider, Historical   calcium carb/vit D3/minerals (Calcium Carbonate-Vit D3-Min) 600 mg (1,500 mg)-200 unit chew  2/12/20  Yes Provider, Historical   bethanechol (URECHOLINE) 25 mg tablet Take  by mouth Before breakfast, lunch, and dinner. Yes Provider, Historical   Dalfampridine-ER 12 HR (AMPYRA) 10 mg tablet Take  by mouth every twelve (12) hours. Yes Provider, Historical   gabapentin (NEURONTIN) 300 mg capsule Take 300 mg by mouth three (3) times daily. Yes Provider, Historical   tiZANidine (ZANAFLEX) 2 mg tablet Take  by mouth. Yes Provider, Historical        ASSESSMENT and PLAN  Diagnoses and all orders for this visit:    1. Hydronephrosis, unspecified hydronephrosis type    2. Retained ureteral stent  Assessment & Plan:  Stents changed 5/27/21. She was treated with macrobid 5/27/21. Orders:  -     CULTURE, URINE  -     URINALYSIS W/MICROSCOPIC  -     AMB POC URINALYSIS DIP STICK AUTO W/O MICRO    3. Chronic kidney disease, unspecified CKD stage  Comments:  BMP now. She is instructed to make f/u with Dr. Trudy Sherman. Assessment & Plan:   Dr. Trudy Sherman is following her function.   I recommend she clarify the follow up with him. BMP today. 4. Acute cystitis without hematuria  Assessment & Plan:   She is s/p ureteral stent changes. She may have chronic colonization and would have trouble with clean catch specimen collection. She feels improved after her course of Macrobid    Orders:  -     CULTURE, URINE  -     URINALYSIS W/MICROSCOPIC  -     AMB POC URINALYSIS DIP STICK AUTO W/O MICRO    5. Neurogenic bladder  Assessment & Plan:   Her stents were changed 5/27/21. Plan on 6m stent changes. 6. Mixed stress and urge urinary incontinence  Assessment & Plan:   She is stable with pad usage. Orders:  -     CULTURE, URINE  -     URINALYSIS W/MICROSCOPIC  -     AMB POC URINALYSIS DIP STICK AUTO W/O MICRO    7. Bladder diverticulum    8.  Multiple sclerosis ([facility] [address]         Arabella Gaytan MD

## 2021-06-22 NOTE — ASSESSMENT & PLAN NOTE
She is s/p ureteral stent changes. She may have chronic colonization and would have trouble with clean catch specimen collection.   She feels improved after her course of Macrobid

## 2021-06-22 NOTE — ASSESSMENT & PLAN NOTE
Dr. Casie Persaud is following her function. I recommend she clarify the follow up with him. BMP today.

## 2021-06-22 NOTE — LETTER
6/22/2021 Patient: Taylor Stanford YOB: 1960 Date of Visit: 6/22/2021 [address], NP 
[address] [place] [address] Via Fax: [phone] Dear [address] [place] Michael, Thank you for referring Ms. Adelso Gutierrez to [place] [address] for evaluation. My notes for this consultation are attached. If you have questions, please do not hesitate to call me. I look forward to following your patient along with you.  
 
 
Sincerely, 
 
Anne Marie Madden MD

## 2021-06-22 NOTE — PROGRESS NOTES
Chief Complaint   Patient presents with    Post OP Follow Up    Hydronephrosis    Neurogenic Bladder    Stress Incontinence     1. Have you been to the ER, urgent care clinic since your last visit? Hospitalized since your last visit? No    2. Have you seen or consulted any other health care providers outside of the [address] [place] [address] since your last visit? Include any pap smears or colon screening.  No  Visit Vitals  /70 (BP 1 Location: Left upper arm, BP Patient Position: Sitting, BP Cuff Size: Adult)   Pulse 86   Ht 5' 3\" (1.6 m)   Wt 170 lb (77.1 kg)   LMP  (LMP Unknown)   SpO2 100%   BMI 30.11 kg/m²

## 2021-06-23 LAB
BUN SERPL-MCNC: 37 MG/DL (ref 6–24)
BUN/CREAT SERPL: 9 (ref 9–23)
CALCIUM SERPL-MCNC: 9.5 MG/DL (ref 8.7–10.2)
CHLORIDE SERPL-SCNC: 108 MMOL/L (ref 96–106)
CO2 SERPL-SCNC: 20 MMOL/L (ref 20–29)
CREAT SERPL-MCNC: 3.9 MG/DL (ref 0.57–1)
GLUCOSE SERPL-MCNC: 90 MG/DL (ref 65–99)
POTASSIUM SERPL-SCNC: 6 MMOL/L (ref 3.5–5.2)
SODIUM SERPL-SCNC: 142 MMOL/L (ref 134–144)

## 2021-06-23 NOTE — PROGRESS NOTES
She is followed by Dr. Casie Persaud. Can we make sure he gets a copy of these results? Phone # listed for him is [phone].

## 2021-06-26 LAB
APPEARANCE UR: ABNORMAL
BACTERIA #/AREA URNS HPF: ABNORMAL /[HPF]
BACTERIA UR CULT: ABNORMAL
BACTERIA UR CULT: ABNORMAL
BILIRUB UR QL STRIP: NEGATIVE
CASTS URNS QL MICRO: ABNORMAL /LPF
COLOR UR: YELLOW
EPI CELLS #/AREA URNS HPF: ABNORMAL /HPF (ref 0–10)
GLUCOSE UR QL: NEGATIVE
HGB UR QL STRIP: ABNORMAL
KETONES UR QL STRIP: NEGATIVE
LEUKOCYTE ESTERASE UR QL STRIP: ABNORMAL
MICRO URNS: ABNORMAL
NITRITE UR QL STRIP: NEGATIVE
PH UR STRIP: 8 [PH] (ref 5–7.5)
PROT UR QL STRIP: ABNORMAL
RBC #/AREA URNS HPF: ABNORMAL /HPF (ref 0–2)
SP GR UR: 1.01 (ref 1–1.03)
UROBILINOGEN UR STRIP-MCNC: 0.2 MG/DL (ref 0.2–1)
WBC #/AREA URNS HPF: >30 /HPF (ref 0–5)

## 2021-07-30 ENCOUNTER — TRANSCRIBE ORDER (OUTPATIENT)
Dept: SCHEDULING | Age: 61
End: 2021-07-30

## 2021-07-30 DIAGNOSIS — N17.9 ACUTE KIDNEY FAILURE, UNSPECIFIED (HCC): ICD-10-CM

## 2021-07-30 DIAGNOSIS — N18.4 CHRONIC KIDNEY DISEASE, STAGE IV (SEVERE) (HCC): Primary | ICD-10-CM

## 2021-07-30 DIAGNOSIS — N13.9 URINARY OBSTRUCTION: ICD-10-CM

## 2021-08-02 ENCOUNTER — HOSPITAL ENCOUNTER (OUTPATIENT)
Dept: INFUSION THERAPY | Age: 61
Discharge: HOME | End: 2021-08-02
Payer: COMMERCIAL

## 2021-08-02 ENCOUNTER — HOSPITAL ENCOUNTER (OUTPATIENT)
Dept: ULTRASOUND IMAGING | Age: 61
Discharge: HOME | End: 2021-08-02
Payer: COMMERCIAL

## 2021-08-02 VITALS
HEART RATE: 81 BPM | DIASTOLIC BLOOD PRESSURE: 84 MMHG | RESPIRATION RATE: 18 BRPM | TEMPERATURE: 98 F | OXYGEN SATURATION: 100 % | SYSTOLIC BLOOD PRESSURE: 143 MMHG

## 2021-08-02 DIAGNOSIS — N18.4 CHRONIC KIDNEY DISEASE, STAGE IV (SEVERE) (HCC): ICD-10-CM

## 2021-08-02 DIAGNOSIS — N13.9 URINARY OBSTRUCTION: ICD-10-CM

## 2021-08-02 DIAGNOSIS — N17.9 ACUTE KIDNEY FAILURE, UNSPECIFIED (HCC): ICD-10-CM

## 2021-08-02 PROCEDURE — 74011250636 HC RX REV CODE- 250/636: Performed by: NURSE PRACTITIONER

## 2021-08-02 PROCEDURE — 76770 US EXAM ABDO BACK WALL COMP: CPT

## 2021-08-02 PROCEDURE — 96374 THER/PROPH/DIAG INJ IV PUSH: CPT

## 2021-08-02 RX ADMIN — IRON SUCROSE 200 MG: 20 INJECTION, SOLUTION INTRAVENOUS at 11:27

## 2021-08-02 NOTE — PROGRESS NOTES
Patient left arm access IV is clean, dry, intact and patent. Patient given information on how to keep line clean, dry and intact for next visit along with signs and symptoms of infection. Patient given discharge education verbally and gave back verbal understanding. Patient other appointment have been made per unit secretary, Clarice Jones.

## 2021-08-02 NOTE — PROGRESS NOTES
Called Dr. Jose Angel Johnson, he was not in the office. Notified Pasquale Sears NP about pt is a hard stick and have PICC team obtaining her IV now, asked if possible for patient to keep IV lock in place since patient has 5 doses of medication every other day starting today. Pasquale Sears NP stated it is ok to keep IV lock in place. Johnson Roque RN from [facility] team obtained IV access. Pharmacy sent medication, and waiting for access. Will continue to monitor patient status.

## 2021-08-04 ENCOUNTER — HOSPITAL ENCOUNTER (OUTPATIENT)
Dept: INFUSION THERAPY | Age: 61
Discharge: HOME | End: 2021-08-04
Attending: INTERNAL MEDICINE | Admitting: INTERNAL MEDICINE
Payer: COMMERCIAL

## 2021-08-04 VITALS
OXYGEN SATURATION: 100 % | TEMPERATURE: 98.1 F | RESPIRATION RATE: 18 BRPM | DIASTOLIC BLOOD PRESSURE: 75 MMHG | SYSTOLIC BLOOD PRESSURE: 122 MMHG | HEART RATE: 82 BPM

## 2021-08-04 PROBLEM — D64.9 ANEMIA: Status: ACTIVE | Noted: 2021-08-04

## 2021-08-04 LAB
APPEARANCE UR: ABNORMAL
BACTERIA URNS QL MICRO: NEGATIVE /HPF
BILIRUB UR QL: NEGATIVE
COLOR UR: ABNORMAL
GLUCOSE UR STRIP.AUTO-MCNC: NEGATIVE MG/DL
HGB UR QL STRIP: ABNORMAL
KETONES UR QL STRIP.AUTO: NEGATIVE MG/DL
LEUKOCYTE ESTERASE UR QL STRIP.AUTO: ABNORMAL
NITRITE UR QL STRIP.AUTO: NEGATIVE
PH UR STRIP: 6 [PH] (ref 5–8)
PROT UR STRIP-MCNC: 100 MG/DL
RBC #/AREA URNS HPF: ABNORMAL /HPF (ref 0–5)
SP GR UR REFRACTOMETRY: 1.01 (ref 1–1.03)
UROBILINOGEN UR QL STRIP.AUTO: 0.1 EU/DL (ref 0.1–1)
WBC URNS QL MICRO: >100 /HPF (ref 0–4)

## 2021-08-04 PROCEDURE — 81001 URINALYSIS AUTO W/SCOPE: CPT

## 2021-08-04 PROCEDURE — 74011250636 HC RX REV CODE- 250/636: Performed by: INTERNAL MEDICINE

## 2021-08-04 PROCEDURE — 96374 THER/PROPH/DIAG INJ IV PUSH: CPT

## 2021-08-04 RX ADMIN — IRON SUCROSE 200 MG: 20 INJECTION, SOLUTION INTRAVENOUS at 12:27

## 2021-08-04 NOTE — PROGRESS NOTES
1200hrs pt complaining of burning during micturation, Dr Miller Woo made aware, ordered urine microscopy with reflex culture, same collected and sent to lab

## 2021-08-04 NOTE — ROUTINE PROCESS
Discharged with instructions  And to follow up for next injection on Friday 8-6-21 at 1000.  And  Tuesday 8-10-21 and Friday8-12-21 next week

## 2021-08-06 ENCOUNTER — HOSPITAL ENCOUNTER (OUTPATIENT)
Dept: INFUSION THERAPY | Age: 61
Discharge: HOME | End: 2021-08-06
Payer: COMMERCIAL

## 2021-08-06 VITALS
TEMPERATURE: 98.5 F | OXYGEN SATURATION: 100 % | HEART RATE: 87 BPM | DIASTOLIC BLOOD PRESSURE: 76 MMHG | RESPIRATION RATE: 18 BRPM | SYSTOLIC BLOOD PRESSURE: 123 MMHG

## 2021-08-06 PROCEDURE — 74011250636 HC RX REV CODE- 250/636: Performed by: NURSE PRACTITIONER

## 2021-08-06 PROCEDURE — 96374 THER/PROPH/DIAG INJ IV PUSH: CPT

## 2021-08-06 RX ADMIN — IRON SUCROSE 200 MG: 20 INJECTION, SOLUTION INTRAVENOUS at 11:34

## 2021-08-06 NOTE — PROGRESS NOTES
Phone call to DR Cooper's office advised of urine specimen results and faxed results to  [address] [place] [address] office , spoke with Marilu Colbert  And she will forward results  to  Nurse practitioner  And have her call presciption in to [facility] if needed  Advised pt to check with  Office , Office to call pt  If prescription called in discharged  With  Saline lock in right arm  Order obtained to leave in [address] [place] [address]  [id number] next  Infusion on Tuesday  Flushed and alcohol cap applied

## 2021-08-09 ENCOUNTER — APPOINTMENT (OUTPATIENT)
Dept: INFUSION THERAPY | Age: 61
End: 2021-08-09

## 2021-08-10 ENCOUNTER — HOSPITAL ENCOUNTER (OUTPATIENT)
Dept: INFUSION THERAPY | Age: 61
Discharge: HOME | End: 2021-08-10
Payer: COMMERCIAL

## 2021-08-10 VITALS
TEMPERATURE: 98.6 F | HEART RATE: 86 BPM | SYSTOLIC BLOOD PRESSURE: 125 MMHG | OXYGEN SATURATION: 97 % | DIASTOLIC BLOOD PRESSURE: 73 MMHG | RESPIRATION RATE: 18 BRPM

## 2021-08-10 PROCEDURE — 96374 THER/PROPH/DIAG INJ IV PUSH: CPT

## 2021-08-10 PROCEDURE — 74011250636 HC RX REV CODE- 250/636: Performed by: NURSE PRACTITIONER

## 2021-08-10 RX ADMIN — IRON SUCROSE 200 MG: 20 INJECTION, SOLUTION INTRAVENOUS at 13:26

## 2021-08-10 NOTE — PROGRESS NOTES
Patient arrived to [facility] for day 4 of iron infusion. VSS. Patient had piv placed by picc team on 8/4/2021 that was left accessed per order. Attempted to flush piv; unsuccessful. PIV infiltrated. Removed with tip intact. Left message with picc team for new line to be placed. Patient resting comfortably in bed with call bell in reach, waiting for picc team and medication to arrive to unit.

## 2021-08-10 NOTE — PROGRESS NOTES
IV iron infusion given by IV push. No signs of an reaction noted. IV site is clean, dry, and intact, covering with gauze, kerlex, and coban to prevent skin breakdown. Redness and an abrasion noted where old IV was and where coban was used.  pushed patient out in wheelchair to private vehicle. Discharge instructions given with a return appointment 8/12/21 @ 1000.

## 2021-08-11 ENCOUNTER — APPOINTMENT (OUTPATIENT)
Dept: INFUSION THERAPY | Age: 61
End: 2021-08-11

## 2021-08-12 ENCOUNTER — HOSPITAL ENCOUNTER (OUTPATIENT)
Dept: INFUSION THERAPY | Age: 61
Discharge: HOME | End: 2021-08-12
Payer: COMMERCIAL

## 2021-08-12 VITALS
DIASTOLIC BLOOD PRESSURE: 73 MMHG | HEIGHT: 63 IN | TEMPERATURE: 97.9 F | HEART RATE: 84 BPM | SYSTOLIC BLOOD PRESSURE: 121 MMHG | BODY MASS INDEX: 28.17 KG/M2 | OXYGEN SATURATION: 100 % | WEIGHT: 159 LBS | RESPIRATION RATE: 18 BRPM

## 2021-08-12 PROCEDURE — 96374 THER/PROPH/DIAG INJ IV PUSH: CPT

## 2021-08-12 PROCEDURE — 74011250636 HC RX REV CODE- 250/636: Performed by: INTERNAL MEDICINE

## 2021-08-12 RX ADMIN — IRON SUCROSE 200 MG: 20 INJECTION, SOLUTION INTRAVENOUS at 11:09

## 2021-08-12 NOTE — PROGRESS NOTES
Patient arrived via wheelchair for 5th venofer injection. She was alert and oriented x 4, vital signs stable, IV previously placed was flushed and patent. Medication was given IV push over 3 minutes. Discharge education completed, no questions or concerns expressed. Patient discharged via wheelchair with spouse.

## 2021-08-30 ENCOUNTER — TELEPHONE (OUTPATIENT)
Dept: UROLOGY | Age: 61
End: 2021-08-30

## 2021-09-01 RX ORDER — BETHANECHOL CHLORIDE 25 MG/1
25 TABLET ORAL
Qty: 270 TABLET | Refills: 3 | Status: SHIPPED | OUTPATIENT
Start: 2021-09-01 | End: 2022-05-24

## 2021-09-01 RX ORDER — TAMSULOSIN HYDROCHLORIDE 0.4 MG/1
0.4 CAPSULE ORAL DAILY
Qty: 90 CAPSULE | Refills: 3 | Status: SHIPPED | OUTPATIENT
Start: 2021-09-01

## 2021-09-03 ENCOUNTER — TELEPHONE (OUTPATIENT)
Dept: UROLOGY | Age: 61
End: 2021-09-03

## 2021-09-20 ENCOUNTER — HOSPITAL ENCOUNTER (OUTPATIENT)
Dept: INFUSION THERAPY | Age: 61
Discharge: HOME | End: 2021-09-20
Payer: COMMERCIAL

## 2021-09-20 VITALS
HEIGHT: 63 IN | OXYGEN SATURATION: 100 % | SYSTOLIC BLOOD PRESSURE: 130 MMHG | WEIGHT: 159 LBS | HEART RATE: 80 BPM | DIASTOLIC BLOOD PRESSURE: 80 MMHG | RESPIRATION RATE: 20 BRPM | TEMPERATURE: 97.7 F | BODY MASS INDEX: 28.17 KG/M2

## 2021-09-20 PROCEDURE — 36569 INSJ PICC 5 YR+ W/O IMAGING: CPT

## 2021-09-20 RX ORDER — GLUCOSAMINE SULFATE 1500 MG
500 POWDER IN PACKET (EA) ORAL DAILY
COMMUNITY

## 2021-09-20 RX ORDER — SODIUM BICARBONATE 650 MG/1
TABLET ORAL 3 TIMES DAILY
COMMUNITY

## 2021-09-20 RX ORDER — FINGOLIMOD HCL 0.5 MG/1
CAPSULE ORAL
COMMUNITY
Start: 2021-07-16

## 2021-09-20 RX ORDER — BISACODYL 5 MG
5 TABLET, DELAYED RELEASE (ENTERIC COATED) ORAL DAILY PRN
COMMUNITY

## 2021-09-20 RX ORDER — PANTOPRAZOLE SODIUM 40 MG/1
40 TABLET, DELAYED RELEASE ORAL DAILY
COMMUNITY
End: 2022-04-26 | Stop reason: ALTCHOICE

## 2021-09-20 NOTE — PROGRESS NOTES
PICC Placement Note    PRE-PROCEDURE VERIFICATION  Correct Procedure: yes  Correct Site:  yes  Temperature: Temp: 97.7 °F (36.5 °C), Temperature Source: Temp Source: Oral  No results for input(s): BUN, CREA, PLT, INR, WBC, PLTEXT, INREXT in the last 72 hours. No lab exists for component: APTHR  Allergies: Latex, natural rubber; Betaseron [interferon beta-1b]; and Tysabri [natalizumab]  Education materials for PICC Care given to family: yes. See Patient Education activity for further details. PICC Booklet placed on chart: yes    PROCEDURE DETAIL  A single lumen PICC line was started for vascular access and antibiotic therapy. The following documentation is in addition to the PICC properties in the lines/airways flowsheet :  Lot #: [id number]  xylocaine used: yes  Mid-Arm Circumference: 31 (cm)  Internal Catheter Length: 36 (cm)  Internal Catheter Total Length: 36 (cm)  Vein Selection for PICC:right basilic  Central Line Bundle followed yes  Complication Related to Insertion: none    The placement was verified by X-ray: no. The x-ray results state the tip location is on the right side and the tip overlies the lower superior vena cava.      Line is okay to use: yes    Shannon Butt RN

## 2021-09-20 NOTE — PROGRESS NOTES
Patient discharged via wheelchair with . Patient verbalized discharge education. PICC single lumen to right arm. Dressing is clean, dry, and intact.

## 2021-11-22 NOTE — PROGRESS NOTES
HISTORY OF PRESENT ILLNESS  Emmie Villalta is a 63 y.o. female. Chief Complaint   Patient presents with    Follow-up    Hydronephrosis    Neurogenic Bladder     She has chronic persistent hydronephrosis, CKD, MS, bladder diverticulum. She is managed with bilateral ureteral stents. They were last exchanged on 5/27/21. Chronic Conditions Addressed Today     1. Hydronephrosis     Overview      US done on 4/7/21 reads bilateral ureteral stents with moderate hydronephrosis despite the placement of stents ([id number]). 5/21/21: persistent hydronephrosis with CKD, MS and bladder diverticulum. 5/27/21 ureteral stent exchange. 6F double J stent bilaterally. Current Assessment & Plan        She is due for ureteral stent changes. We will schedule her for the procedure. 2. Neurogenic bladder     Overview      She has a neurogenic bladder secondary to multiple sclerosis with previous history of urinary retention along with bilateral hydronephrosis treated with bilateral stent placement on 12/7/2020 by Dr. Baldemar Green with assistance from Dr. JORGE [facility]. Stent exchange on 5/27/21. Current Assessment & Plan      She is on tamsulosin to help with bladder emptying. She is on bethanechol 25mg tid. We can increase dose and to qid if needed. She is due for stent changes. We will upsize to 8 [place] stents for maximal drainage. Schedule surgery          Relevant Orders     AMB POC URINALYSIS DIP STICK AUTO W/O MICRO     CULTURE, URINE    3.  Acute cystitis without hematuria     Overview      9/17/20: cultrue with Streptococci, beta hemolyticgroup B(predominating) and E coli, greater than 100,000 colony forming units per mL.     11/13/20: culture with Citrobacter freundii,  greater than 100,000 colony forming units per mL.     12/7/21: culture with Pseudomonas aeruginosa, greater than 100,000 colony forming units per mL.    3/21/21: culture with Beta hemolytic Streptococcus, group B, greater than 100,000 colony forming units per mL.    5/21/21: culture with Beta hemolytic Streptococcus, group B, greater than 100,000 colony forming units per mL. She was prescribed Macrobid x 2 weeks. Relevant Orders     CULTURE, URINE    4. Retained ureteral stent - Primary     Overview      12/7/2020 bilateral ureteral stent placement by Dr. Darin Canales with assistance from Dr. Karie Merritt. 5/27/21: stent exchange. Treated with nitrofurantoin on 5/27/21. Current Assessment & Plan       Plan on ureteral stent changes          Relevant Orders     AMB POC URINALYSIS DIP STICK AUTO W/O MICRO     CULTURE, URINE    5. CKD (chronic kidney disease)     Overview      CKD, followed by Dr. Hossein Orourke, Nephrology. 12/3/2020 Cr was 1.86  4/26/21 Cr was 3.8          Current Assessment & Plan       She has chronic kidney disease. Bilateral hydro managed with ureteral stents. Neurogenic bladder secondary to multiple sclerosis. Check a BMP with preop labs. She is instructed to hydrate well. 6. Mixed stress and urge urinary incontinence     Overview      5/21/21: NGB secondary to MS. Manages with pads, changes them 2 times/day. Relevant Orders     CULTURE, URINE               Patient denies the symptoms of COVID-19 per routine screening guidelines. ROS    Past Medical History:  PMHx (including negatives):  has a past medical history of Arthritis, Burning with urination, CKD (chronic kidney disease), GERD (gastroesophageal reflux disease), MS (multiple sclerosis) ([facility] [address] Nonneurogenic neurogenic bladder dysfunction, and S/P ureteral stent placement.    PSurgHx:  has a past surgical history that includes hx hysterectomy; hx femur fracture tx; hx urological (12/07/2020); hx urological (12/07/2020); hx urological (12/07/2020); hx urological (Bilateral, 12/07/2020); hx urological (05/27/2021); hx urological (Bilateral, 05/27/2021); hx urological (Bilateral, 05/27/2021); hx ankle fracture tx; and pr femur/knee surg unlisted. PSocHx:  reports that she has never smoked. She has never used smokeless tobacco. She reports current alcohol use. She reports that she does not use drugs. Physical Exam  Constitutional:       General: She is not in acute distress. Appearance: She is not ill-appearing. Neurological:      Motor: Weakness (in a wheelchair) present. ASSESSMENT and PLAN  Diagnoses and all orders for this visit:    1. Retained ureteral stent  Assessment & Plan:   Plan on ureteral stent changes    Orders:  -     AMB POC URINALYSIS DIP STICK AUTO W/O MICRO  -     CULTURE, URINE    2. Hydronephrosis, unspecified hydronephrosis type  Assessment & Plan:    She is due for ureteral stent changes. We will schedule her for the procedure. 3. Neurogenic bladder  Assessment & Plan:  She is on tamsulosin to help with bladder emptying. She is on bethanechol 25mg tid. We can increase dose and to qid if needed. She is due for stent changes. We will upsize to 8 [place] stents for maximal drainage. Schedule surgery    Orders:  -     AMB POC URINALYSIS DIP STICK AUTO W/O MICRO  -     CULTURE, URINE    4. Acute cystitis without hematuria  -     CULTURE, URINE    5. Mixed stress and urge urinary incontinence  -     CULTURE, URINE    6. Chronic kidney disease, unspecified CKD stage  Assessment & Plan:   She has chronic kidney disease. Bilateral hydro managed with ureteral stents. Neurogenic bladder secondary to multiple sclerosis. Check a BMP with preop labs. She is instructed to hydrate well. Follow-up and Dispositions    · Return for Surgery to be scheduled.          Fuad Gustafson MD

## 2021-11-30 ENCOUNTER — OFFICE VISIT (OUTPATIENT)
Dept: UROLOGY | Age: 61
End: 2021-11-30
Payer: COMMERCIAL

## 2021-11-30 VITALS
SYSTOLIC BLOOD PRESSURE: 108 MMHG | DIASTOLIC BLOOD PRESSURE: 70 MMHG | HEART RATE: 82 BPM | RESPIRATION RATE: 22 BRPM | HEIGHT: 63 IN | BODY MASS INDEX: 28.17 KG/M2 | OXYGEN SATURATION: 98 % | WEIGHT: 159 LBS | TEMPERATURE: 98.7 F

## 2021-11-30 DIAGNOSIS — N18.9 CHRONIC KIDNEY DISEASE, UNSPECIFIED CKD STAGE: ICD-10-CM

## 2021-11-30 DIAGNOSIS — N31.9 NEUROGENIC BLADDER: ICD-10-CM

## 2021-11-30 DIAGNOSIS — N39.46 MIXED STRESS AND URGE URINARY INCONTINENCE: ICD-10-CM

## 2021-11-30 DIAGNOSIS — Z96.0 RETAINED URETERAL STENT: Primary | ICD-10-CM

## 2021-11-30 DIAGNOSIS — N30.00 ACUTE CYSTITIS WITHOUT HEMATURIA: ICD-10-CM

## 2021-11-30 DIAGNOSIS — N13.30 HYDRONEPHROSIS, UNSPECIFIED HYDRONEPHROSIS TYPE: ICD-10-CM

## 2021-11-30 LAB
BILIRUB UR QL STRIP: NEGATIVE
GLUCOSE UR-MCNC: NORMAL MG/DL
KETONES P FAST UR STRIP-MCNC: NEGATIVE MG/DL
PH UR STRIP: 7 [PH] (ref 4.6–8)
PROT UR QL STRIP: NORMAL
SP GR UR STRIP: 1.02 (ref 1–1.03)
UA UROBILINOGEN AMB POC: NORMAL (ref 0.2–1)
URINALYSIS CLARITY POC: CLEAR
URINALYSIS COLOR POC: YELLOW
URINE BLOOD POC: NORMAL
URINE LEUKOCYTES POC: NORMAL
URINE NITRITES POC: NEGATIVE

## 2021-11-30 PROCEDURE — 81003 URINALYSIS AUTO W/O SCOPE: CPT | Performed by: UROLOGY

## 2021-11-30 PROCEDURE — 99214 OFFICE O/P EST MOD 30 MIN: CPT | Performed by: UROLOGY

## 2021-11-30 NOTE — ASSESSMENT & PLAN NOTE
She is on tamsulosin to help with bladder emptying. She is on bethanechol 25mg tid. We can increase dose and to qid if needed. She is due for stent changes. We will upsize to 8 [place] stents for maximal drainage.  Schedule surgery

## 2021-11-30 NOTE — LETTER
11/30/2021    Patient: Mi Petit   YOB: 1960   Date of Visit: 11/30/2021     Thaddeus Padilla NP  [address] [facility]  [phone]  Via Fax: [address], [place]  [address] [employer] [address]  Via Fax: [phone]    Dear ЮЛИЯ Madrid MD,      Thank you for referring Ms. Shai Gonzalez to [place] [address] for evaluation. My notes for this consultation are attached. If you have questions, please do not hesitate to call me. I look forward to following your patient along with you.       Sincerely,    Fuad Gustafson MD

## 2021-11-30 NOTE — PROGRESS NOTES
1. Have you been to the ER, urgent care clinic since your last visit? Hospitalized since your last visit? No    2. Have you seen or consulted any other health care providers outside of the [address] [place] [address] since your last visit? Include any pap smears or colon screening.  PCP  Chief Complaint   Patient presents with    Follow-up    Hydronephrosis    Neurogenic Bladder     Visit Vitals  /70 (BP 1 Location: Right arm, BP Patient Position: Sitting, BP Cuff Size: Adult)   Pulse 82   Temp 98.7 °F (37.1 °C) (Temporal)   Resp 22   Ht 5' 3\" (1.6 m)   Wt 159 lb (72.1 kg)   LMP  (LMP Unknown)   SpO2 98%   BMI 28.17 kg/m²

## 2021-11-30 NOTE — ASSESSMENT & PLAN NOTE
She has chronic kidney disease. Bilateral hydro managed with ureteral stents. Neurogenic bladder secondary to multiple sclerosis. Check a BMP with preop labs. She is instructed to hydrate well.

## 2021-12-05 LAB — BACTERIA UR CULT: NORMAL

## 2021-12-16 ENCOUNTER — HOSPITAL ENCOUNTER (OUTPATIENT)
Dept: PREADMISSION TESTING | Age: 61
Discharge: HOME | End: 2021-12-16

## 2021-12-16 LAB — SARS-COV-2, COV2: NORMAL

## 2021-12-16 PROCEDURE — U0003 INFECTIOUS AGENT DETECTION BY NUCLEIC ACID (DNA OR RNA); SEVERE ACUTE RESPIRATORY SYNDROME CORONAVIRUS 2 (SARS-COV-2) (CORONAVIRUS DISEASE [COVID-19]), AMPLIFIED PROBE TECHNIQUE, MAKING USE OF HIGH THROUGHPUT TECHNOLOGIES AS DESCRIBED BY CMS-2020-01-R: HCPCS

## 2021-12-18 LAB
SARS-COV-2, XPLCVT: NOT DETECTED
SOURCE, COVRS: NORMAL

## 2022-01-05 ENCOUNTER — TELEPHONE (OUTPATIENT)
Dept: UROLOGY | Age: 62
End: 2022-01-05

## 2022-01-05 NOTE — TELEPHONE ENCOUNTER
Looks like pts surgery was canceled on 12/20 can you please cancel her post op follow up on 1/13 and make sure she is aware we are canceling

## 2022-02-08 ENCOUNTER — HOSPITAL ENCOUNTER (EMERGENCY)
Age: 62
Discharge: HOME | End: 2022-02-08
Attending: FAMILY MEDICINE
Payer: COMMERCIAL

## 2022-02-08 ENCOUNTER — APPOINTMENT (OUTPATIENT)
Dept: GENERAL RADIOLOGY | Age: 62
End: 2022-02-08
Attending: FAMILY MEDICINE
Payer: COMMERCIAL

## 2022-02-08 VITALS
HEIGHT: 63 IN | BODY MASS INDEX: 28.35 KG/M2 | RESPIRATION RATE: 16 BRPM | OXYGEN SATURATION: 100 % | TEMPERATURE: 97.9 F | WEIGHT: 160 LBS | HEART RATE: 79 BPM | SYSTOLIC BLOOD PRESSURE: 137 MMHG | DIASTOLIC BLOOD PRESSURE: 72 MMHG

## 2022-02-08 DIAGNOSIS — S89.92XA INJURY OF LEFT KNEE, INITIAL ENCOUNTER: Primary | ICD-10-CM

## 2022-02-08 DIAGNOSIS — M17.12 ARTHRITIS OF LEFT KNEE: ICD-10-CM

## 2022-02-08 DIAGNOSIS — M25.469 SUPRAPATELLAR EFFUSION OF KNEE: ICD-10-CM

## 2022-02-08 PROCEDURE — 73564 X-RAY EXAM KNEE 4 OR MORE: CPT

## 2022-02-08 PROCEDURE — 99282 EMERGENCY DEPT VISIT SF MDM: CPT

## 2022-02-08 NOTE — ED PROVIDER NOTES
EMERGENCY DEPARTMENT HISTORY AND PHYSICAL EXAM      Date: 2/8/2022  Patient Name: Irma Calle    History of Presenting Illness     Chief Complaint   Patient presents with    Knee Pain       History Provided By:     HPI: Irma Calle, is an extremely pleasant 63 y.o. female presenting to the ED with a chief complaint of left knee pain. Patient states she was walking when her leg got stuck in a bent on the floor. This caused her to subsequently twist at the left knee. Since this time she has had difficulty bearing weight on her left lower extremity secondary to pain in the knee. She denies any hip, ankle nor foot pain. No other symptoms. There are no other complaints, changes, or physical findings at this time. PCP: Bradley Cardoso NP    No current facility-administered medications on file prior to encounter. Current Outpatient Medications on File Prior to Encounter   Medication Sig Dispense Refill    Gilenya 0.5 mg cap       vit A/vit C/vit E/zinc/copper (PRESERVISION AREDS PO) Take  by mouth daily.  bisacodyL (Dulcolax, bisacodyl,) 5 mg EC tablet Take 5 mg by mouth daily as needed for Constipation.  cholecalciferol (Vitamin D3) 25 mcg (1,000 unit) cap Take 500 Units by mouth daily.  sodium bicarbonate 650 mg tablet Take  by mouth two (2) times a day.  pantoprazole (Protonix) 40 mg tablet Take 40 mg by mouth daily. (Patient not taking: Reported on 12/14/2021)      OTHER daily. X21 days after Solumedrol infusion   Indications: Methylprednisolone 4mg (Patient not taking: Reported on 11/30/2021)      tamsulosin (FLOMAX) 0.4 mg capsule Take 1 Capsule by mouth daily. 90 Capsule 3    bethanechol (URECHOLINE) 25 mg tablet Take 1 Tablet by mouth Before breakfast, lunch, and dinner. 270 Tablet 3    NALTREXONE TAKE ONE CAPSULE (4.5mg) BY MOUTH DAILY AT BEDTIME as directed      amitriptyline (ELAVIL) 25 mg tablet Take 25 mg by mouth nightly.       senna (Senna) 8.6 mg tablet Take 1 Tab by mouth daily.  calcium carb/vit D3/minerals (Calcium Carbonate-Vit D3-Min) 600 mg (1,500 mg)-200 unit chew       Dalfampridine-ER 12 HR (AMPYRA) 10 mg tablet Take  by mouth every twelve (12) hours.  gabapentin (NEURONTIN) 300 mg capsule Take 300 mg by mouth three (3) times daily. PT states BID      tiZANidine (ZANAFLEX) 2 mg tablet Take  by mouth.          Past History     Past Medical History:  Past Medical History:   Diagnosis Date    Arthritis     Burning with urination     Chronic kidney disease     CKD (chronic kidney disease)     GERD (gastroesophageal reflux disease)     [facility]     Nonneurogenic neurogenic bladder dysfunction     S/P ureteral stent placement        Past Surgical History:  Past Surgical History:   Procedure Laterality Date    HX ANKLE FRACTURE TX      HX FEMUR FRACTURE TX      HX HYSTERECTOMY      HX UROLOGICAL  12/07/2020    Cystourethroscop    HX UROLOGICAL  12/07/2020     urethral dilation    HX UROLOGICAL  12/07/2020    bilateral retrograde pyelograms    HX UROLOGICAL Bilateral 12/07/2020    ureteral stent placements    HX UROLOGICAL  05/27/2021    cystoscopy     HX UROLOGICAL Bilateral 05/27/2021    bilateral retrograde pyelogram     HX UROLOGICAL Bilateral 05/27/2021    bilateral ureteral stent changes     [place] FEMUR/KNEE SURG UNLISTED      FX       Family History:  Family History   Problem Relation Age of Onset    Hypertension Mother     Ovarian Cancer Mother     Cancer Mother     Hypertension Maternal Grandmother     Ovarian Cancer Maternal Grandmother     Cancer Maternal Grandmother     Hypertension Maternal Grandfather     Hypertension Paternal Grandmother     Hypertension Paternal Grandfather     Hypertension Father        Social History:  Social History     Tobacco Use    Smoking status: Never Smoker    Smokeless tobacco: Never Used   Vaping Use    Vaping Use: Never used   Substance Use Topics    Alcohol use: Yes     Comment: occasionally    Drug use: Never       Allergies: Allergies   Allergen Reactions    Latex, Natural Rubber Itching     Per patient: \"just regular, no anaphylaxis\"      Betaseron [Interferon Beta-1b] Rash    Tysabri [Natalizumab] Hives         Review of Systems     Review of Systems   Constitutional: Negative for activity change, appetite change, chills, fatigue and fever. HENT: Negative for congestion and sore throat. Eyes: Negative for photophobia and visual disturbance. Respiratory: Negative for cough, shortness of breath and wheezing. Cardiovascular: Negative for chest pain, palpitations and leg swelling. Gastrointestinal: Negative for abdominal pain, diarrhea, nausea and vomiting. Endocrine: Negative for cold intolerance and heat intolerance. Musculoskeletal:        See HPI   Skin: Negative for color change and rash. Neurological: Negative for dizziness and headaches. Physical Exam     Physical Exam  Constitutional:       Appearance: She is well-developed. HENT:      Head: Normocephalic and atraumatic. Mouth/Throat:      Mouth: Mucous membranes are moist.      Pharynx: Oropharynx is clear. Eyes:      Conjunctiva/sclera: Conjunctivae normal.      Pupils: Pupils are equal, round, and reactive to light. Cardiovascular:      Rate and Rhythm: Normal rate and regular rhythm. Heart sounds: No murmur heard. Pulmonary:      Effort: No respiratory distress. Breath sounds: No stridor. No wheezing, rhonchi or rales. Abdominal:      General: There is no distension. Tenderness: There is no abdominal tenderness. There is no rebound. Musculoskeletal:      Cervical back: Normal range of motion and neck supple. Comments: Tenderness to palpation along the medial aspect of the left knee. Positive meniscal testing. Patella is nontender. No laxity of the medial nor collateral ligaments. ACL and PCL testing is unremarkable. Skin:     General: Skin is warm and dry. Neurological:      General: No focal deficit present. Mental Status: She is alert and oriented to person, place, and time. Psychiatric:         Mood and Affect: Mood normal.         Behavior: Behavior normal.         Lab and Diagnostic Study Results     Labs -   No results found for this or any previous visit (from the past 12 hour(s)). Radiologic Studies -   @lastxrresult@  CT Results  (Last 48 hours)    None        CXR Results  (Last 48 hours)    None            Medical Decision Making   - I am the first provider for this patient. - I reviewed the vital signs, available nursing notes, past medical history, past surgical history, family history and social history. - Initial assessment performed. The patients presenting problems have been discussed, and they are in agreement with the care plan formulated and outlined with them. I have encouraged them to ask questions as they arise throughout their visit. Vital Signs-Reviewed the patient's vital signs. Patient Vitals for the past 12 hrs:   Temp Pulse Resp BP SpO2   02/08/22 1719 97.9 °F (36.6 °C) 79 16 137/72 100 %         ED Course/ Provider Notes (Medical Decision Making):     Patient presented to the emergency department with a chief complaint of left knee pain. On examination the patient is nontoxic and well-appearing. Vitals were reviewed per above. Positive meniscal testing. Left knee x-ray ordered. Given concern for meniscal injury, patient placed in knee immobilizer. Nonweightbearing. Information to follow-up with orthopedics. Melissa Cano was given a thorough list of signs and symptoms that would warrant an immediate return to the emergency department. Otherwise Melissa Cano will follow up with PCP.        Procedures   Medical Decision Makingedical Decision Making  Performed by: Beatriz Sanchez DO  Procedures  None       Disposition   Disposition:     Home     All of the diagnostic tests were reviewed and questions answered. Diagnosis, care plan and treatment options were discussed. The patient understands the instructions and will follow up as directed. The patients results have been reviewed with them. They have been counseled regarding their diagnosis. The patient verbally convey understanding and agreement of the signs, symptoms, diagnosis, treatment and prognosis and additionally agrees to follow up as recommended with their PCP in 24 - 48 hours. They also agree with the care-plan and convey that all of their questions have been answered. I have also put together some discharge instructions for them that include: 1) educational information regarding their diagnosis, 2) how to care for their diagnosis at home, as well a 3) list of reasons why they would want to return to the ED prior to their follow-up appointment, should their condition change. DISCHARGE PLAN:    1. Current Discharge Medication List      CONTINUE these medications which have NOT CHANGED    Details   Gilenya 0.5 mg cap       vit A/vit C/vit E/zinc/copper (PRESERVISION AREDS PO) Take  by mouth daily. bisacodyL (Dulcolax, bisacodyl,) 5 mg EC tablet Take 5 mg by mouth daily as needed for Constipation. cholecalciferol (Vitamin D3) 25 mcg (1,000 unit) cap Take 500 Units by mouth daily. sodium bicarbonate 650 mg tablet Take  by mouth two (2) times a day. pantoprazole (Protonix) 40 mg tablet Take 40 mg by mouth daily. OTHER daily. X21 days after Solumedrol infusion   Indications: Methylprednisolone 4mg      tamsulosin (FLOMAX) 0.4 mg capsule Take 1 Capsule by mouth daily. Qty: 90 Capsule, Refills: 3    Comments: Requesting 1 year supply      bethanechol (URECHOLINE) 25 mg tablet Take 1 Tablet by mouth Before breakfast, lunch, and dinner.   Qty: 270 Tablet, Refills: 3      NALTREXONE TAKE ONE CAPSULE (4.5mg) BY MOUTH DAILY AT BEDTIME as directed      amitriptyline (ELAVIL) 25 mg tablet Take 25 mg by mouth nightly. senna (Senna) 8.6 mg tablet Take 1 Tab by mouth daily. calcium carb/vit D3/minerals (Calcium Carbonate-Vit D3-Min) 600 mg (1,500 mg)-200 unit chew       Dalfampridine-ER 12 HR (AMPYRA) 10 mg tablet Take  by mouth every twelve (12) hours. gabapentin (NEURONTIN) 300 mg capsule Take 300 mg by mouth three (3) times daily. PT states BID      tiZANidine (ZANAFLEX) 2 mg tablet Take  by mouth. 2.   Follow-up Information     Follow up With Specialties Details Why Contact Info    Marnie Menjivar NP Nurse Practitioner Call   [address] [facility]  [phone] [facility]  Call   [facility] [address]  [phone]            3. Return to ED if worse       4. Current Discharge Medication List            Diagnosis     Clinical Impression:    1. Injury of left knee, initial encounter        Attestations:    Jasen Ansari, DO    Please note that this dictation was completed with [employer], the computer voice recognition software. Quite often unanticipated grammatical, syntax, homophones, and other interpretive errors are inadvertently transcribed by the computer software. Please disregard these errors. Please excuse any errors that have escaped final proofreading. Thank you.

## 2022-02-08 NOTE — ED TRIAGE NOTES
63 yr old female in with left knee pain. Pt leg got stuck in vent and twisted. This occurred yesterday. Pt in w/c with history of ms.

## 2022-02-08 NOTE — DISCHARGE INSTRUCTIONS
Thank you! Thank you for allowing me to care for you in the emergency department. I sincerely hope that you are satisfied with your visit today. It is my goal to provide you with excellent care. Below you will find a list of your labs and imaging from your visit today. Should you have any questions regarding these results please do not hesitate to call the emergency department. Labs -   No results found for this or any previous visit (from the past 12 hour(s)). Radiologic Studies -   XR KNEE LT MIN 4 V    (Results Pending)     CT Results  (Last 48 hours)      None          CXR Results  (Last 48 hours)      None               If you feel that you have not received excellent quality care or timely care, please ask to speak to the nurse manager. Please choose us in the future for your continued health care needs. ------------------------------------------------------------------------------------------------------------  The exam and treatment you received in the Emergency Department were for an urgent problem and are not intended as complete care. It is important that you follow-up with a doctor, nurse practitioner, or physician assistant to:  (1) confirm your diagnosis,  (2) re-evaluation of changes in your illness and treatment, and  (3) for ongoing care. If your symptoms become worse or you do not improve as expected and you are unable to reach your usual health care provider, you should return to the Emergency Department. We are available 24 hours a day. Please take your discharge instructions with you when you go to your follow-up appointment. If you have any problem arranging a follow-up appointment, contact the Emergency Department immediately. If a prescription has been provided, please have it filled as soon as possible to prevent a delay in treatment. Read the entire medication instruction sheet provided to you by the pharmacy.  If you have any questions or reservations about taking the medication due to side effects or interactions with other medications, please call your primary care physician or contact the ER to speak with the charge nurse. Make an appointment with your family doctor or the physician you were referred to for follow-up of this visit as instructed on your discharge paperwork, as this is a mandatory follow-up. Return to the ER if you are unable to be seen or if you are unable to be seen in a timely manner. If you have any problem arranging the follow-up visit, contact the Emergency Department immediately.

## 2022-02-27 ENCOUNTER — HOSPITAL ENCOUNTER (OUTPATIENT)
Dept: PREADMISSION TESTING | Age: 62
Discharge: HOME | End: 2022-02-27
Payer: COMMERCIAL

## 2022-02-27 LAB — SARS-COV-2, COV2: NORMAL

## 2022-02-27 PROCEDURE — U0005 INFEC AGEN DETEC AMPLI PROBE: HCPCS

## 2022-02-28 LAB
SARS-COV-2, XPLCVT: NOT DETECTED
SOURCE, COVRS: NORMAL

## 2022-03-03 ENCOUNTER — ANESTHESIA EVENT (OUTPATIENT)
Dept: SURGERY | Age: 62
End: 2022-03-03

## 2022-03-03 ENCOUNTER — APPOINTMENT (OUTPATIENT)
Dept: GENERAL RADIOLOGY | Age: 62
End: 2022-03-03
Attending: UROLOGY

## 2022-03-03 ENCOUNTER — ANESTHESIA (OUTPATIENT)
Dept: SURGERY | Age: 62
End: 2022-03-03

## 2022-03-03 ENCOUNTER — HOSPITAL ENCOUNTER (OUTPATIENT)
Age: 62
Discharge: HOME | End: 2022-03-03
Attending: UROLOGY | Admitting: UROLOGY

## 2022-03-03 VITALS
HEIGHT: 63 IN | DIASTOLIC BLOOD PRESSURE: 71 MMHG | BODY MASS INDEX: 28.17 KG/M2 | TEMPERATURE: 97.4 F | RESPIRATION RATE: 18 BRPM | SYSTOLIC BLOOD PRESSURE: 120 MMHG | WEIGHT: 159 LBS | HEART RATE: 74 BPM | OXYGEN SATURATION: 100 %

## 2022-03-03 LAB
ATRIAL RATE: 76 BPM
CALCULATED P AXIS, ECG09: 49 DEGREES
CALCULATED R AXIS, ECG10: -33 DEGREES
CALCULATED T AXIS, ECG11: 28 DEGREES
DIAGNOSIS, 93000: NORMAL
P-R INTERVAL, ECG05: 162 MS
POTASSIUM SERPL-SCNC: 5.4 MMOL/L (ref 3.5–5.1)
Q-T INTERVAL, ECG07: 402 MS
QRS DURATION, ECG06: 90 MS
QTC CALCULATION (BEZET), ECG08: 452 MS
VENTRICULAR RATE, ECG03: 76 BPM

## 2022-03-03 PROCEDURE — 76060000032 HC ANESTHESIA 0.5 TO 1 HR: Performed by: UROLOGY

## 2022-03-03 PROCEDURE — 74011250636 HC RX REV CODE- 250/636: Performed by: UROLOGY

## 2022-03-03 PROCEDURE — 76210000063 HC OR PH I REC FIRST 0.5 HR: Performed by: UROLOGY

## 2022-03-03 PROCEDURE — 74011000250 HC RX REV CODE- 250: Performed by: NURSE ANESTHETIST, CERTIFIED REGISTERED

## 2022-03-03 PROCEDURE — 76000 FLUOROSCOPY <1 HR PHYS/QHP: CPT

## 2022-03-03 PROCEDURE — 87086 URINE CULTURE/COLONY COUNT: CPT

## 2022-03-03 PROCEDURE — 76010000138 HC OR TIME 0.5 TO 1 HR: Performed by: UROLOGY

## 2022-03-03 PROCEDURE — C1758 CATHETER, URETERAL: HCPCS | Performed by: UROLOGY

## 2022-03-03 PROCEDURE — C2617 STENT, NON-COR, TEM W/O DEL: HCPCS | Performed by: UROLOGY

## 2022-03-03 PROCEDURE — 52332 CYSTOSCOPY AND TREATMENT: CPT | Performed by: UROLOGY

## 2022-03-03 PROCEDURE — 36415 COLL VENOUS BLD VENIPUNCTURE: CPT

## 2022-03-03 PROCEDURE — 74420 UROGRAPHY RTRGR +-KUB: CPT | Performed by: UROLOGY

## 2022-03-03 PROCEDURE — 93005 ELECTROCARDIOGRAM TRACING: CPT

## 2022-03-03 PROCEDURE — 2709999900 HC NON-CHARGEABLE SUPPLY: Performed by: UROLOGY

## 2022-03-03 PROCEDURE — C1769 GUIDE WIRE: HCPCS | Performed by: UROLOGY

## 2022-03-03 PROCEDURE — 74011000636 HC RX REV CODE- 636: Performed by: UROLOGY

## 2022-03-03 PROCEDURE — 76210000026 HC REC RM PH II 1 TO 1.5 HR: Performed by: UROLOGY

## 2022-03-03 PROCEDURE — 74011250636 HC RX REV CODE- 250/636: Performed by: NURSE ANESTHETIST, CERTIFIED REGISTERED

## 2022-03-03 PROCEDURE — 84132 ASSAY OF SERUM POTASSIUM: CPT

## 2022-03-03 DEVICE — URETERAL STENT
Type: IMPLANTABLE DEVICE | Site: URETER | Status: NON-FUNCTIONAL
Brand: PERCUFLEX™ PLUS
Removed: 2022-05-02

## 2022-03-03 RX ORDER — PROPOFOL 10 MG/ML
INJECTION, EMULSION INTRAVENOUS AS NEEDED
Status: DISCONTINUED | OUTPATIENT
Start: 2022-03-03 | End: 2022-03-03 | Stop reason: HOSPADM

## 2022-03-03 RX ORDER — FENTANYL CITRATE 50 UG/ML
INJECTION, SOLUTION INTRAMUSCULAR; INTRAVENOUS AS NEEDED
Status: DISCONTINUED | OUTPATIENT
Start: 2022-03-03 | End: 2022-03-03 | Stop reason: HOSPADM

## 2022-03-03 RX ORDER — SODIUM CHLORIDE 0.9 % (FLUSH) 0.9 %
5-40 SYRINGE (ML) INJECTION AS NEEDED
Status: CANCELLED | OUTPATIENT
Start: 2022-03-03

## 2022-03-03 RX ORDER — NITROFURANTOIN 25; 75 MG/1; MG/1
100 CAPSULE ORAL 2 TIMES DAILY
Qty: 14 CAPSULE | Refills: 0 | Status: SHIPPED | OUTPATIENT
Start: 2022-03-03 | End: 2022-03-10

## 2022-03-03 RX ORDER — SODIUM CHLORIDE 9 MG/ML
20 INJECTION, SOLUTION INTRAVENOUS CONTINUOUS
Status: DISCONTINUED | OUTPATIENT
Start: 2022-03-03 | End: 2022-03-03 | Stop reason: HOSPADM

## 2022-03-03 RX ORDER — ALBUTEROL SULFATE 0.83 MG/ML
2.5 SOLUTION RESPIRATORY (INHALATION) AS NEEDED
Status: CANCELLED | OUTPATIENT
Start: 2022-03-03

## 2022-03-03 RX ORDER — DIPHENHYDRAMINE HYDROCHLORIDE 50 MG/ML
12.5 INJECTION, SOLUTION INTRAMUSCULAR; INTRAVENOUS AS NEEDED
Status: CANCELLED | OUTPATIENT
Start: 2022-03-03 | End: 2022-03-03

## 2022-03-03 RX ORDER — LIDOCAINE HYDROCHLORIDE 20 MG/ML
INJECTION, SOLUTION EPIDURAL; INFILTRATION; INTRACAUDAL; PERINEURAL AS NEEDED
Status: DISCONTINUED | OUTPATIENT
Start: 2022-03-03 | End: 2022-03-03 | Stop reason: HOSPADM

## 2022-03-03 RX ORDER — SODIUM CHLORIDE 9 MG/ML
INJECTION, SOLUTION INTRAVENOUS
Status: DISCONTINUED | OUTPATIENT
Start: 2022-03-03 | End: 2022-03-03 | Stop reason: HOSPADM

## 2022-03-03 RX ORDER — HYDROCODONE BITARTRATE AND ACETAMINOPHEN 5; 325 MG/1; MG/1
2 TABLET ORAL AS NEEDED
Status: CANCELLED | OUTPATIENT
Start: 2022-03-03

## 2022-03-03 RX ORDER — CEFAZOLIN SODIUM 1 G/3ML
INJECTION, POWDER, FOR SOLUTION INTRAMUSCULAR; INTRAVENOUS AS NEEDED
Status: DISCONTINUED | OUTPATIENT
Start: 2022-03-03 | End: 2022-03-03 | Stop reason: HOSPADM

## 2022-03-03 RX ORDER — FENTANYL CITRATE 50 UG/ML
25 INJECTION, SOLUTION INTRAMUSCULAR; INTRAVENOUS
Status: CANCELLED | OUTPATIENT
Start: 2022-03-03

## 2022-03-03 RX ORDER — ONDANSETRON 2 MG/ML
4 INJECTION INTRAMUSCULAR; INTRAVENOUS AS NEEDED
Status: CANCELLED | OUTPATIENT
Start: 2022-03-03

## 2022-03-03 RX ORDER — HYDROMORPHONE HYDROCHLORIDE 1 MG/ML
0.25 INJECTION, SOLUTION INTRAMUSCULAR; INTRAVENOUS; SUBCUTANEOUS
Status: CANCELLED | OUTPATIENT
Start: 2022-03-03

## 2022-03-03 RX ADMIN — CEFAZOLIN SODIUM 2 G: 1 INJECTION, POWDER, FOR SOLUTION INTRAMUSCULAR; INTRAVENOUS at 08:37

## 2022-03-03 RX ADMIN — LIDOCAINE HYDROCHLORIDE 40 MG: 20 INJECTION, SOLUTION EPIDURAL; INFILTRATION; INTRACAUDAL; PERINEURAL at 08:35

## 2022-03-03 RX ADMIN — PROPOFOL 100 MG: 10 INJECTION, EMULSION INTRAVENOUS at 08:35

## 2022-03-03 RX ADMIN — SODIUM CHLORIDE: 9 INJECTION, SOLUTION INTRAVENOUS at 08:30

## 2022-03-03 RX ADMIN — SODIUM CHLORIDE 20 ML/HR: 9 INJECTION, SOLUTION INTRAVENOUS at 07:42

## 2022-03-03 RX ADMIN — FENTANYL CITRATE 100 MCG: 50 INJECTION, SOLUTION INTRAMUSCULAR; INTRAVENOUS at 08:35

## 2022-03-03 NOTE — H&P
UROLOGY HISTORY AND PHYSICAL  Sascha Bangura, [address] [facility] Office    Patient: Ben More MRN: [id number]  SSN: [id number]    YOB: 1960  Age: 63 y.o. Sex: female          Date of Encounter:  March 3, 2022  ADMITTED: 3/3/2022 to Sharif Whyte MD by Lawrence Angel MD for Retained ureteral stent [Z96.0]; Hydronephrosis, unspecified hydronephrosis type [N13.30]  Chief Complaint:  hydronephrosis             History of Present Illness:  Patient is a 63 y.o. female admitted 3/3/2022 to the hospital for Retained ureteral stent [Z96.0]; Hydronephrosis, unspecified hydronephrosis type [N13.30]. The patient is here for surgery. She has chronic hydronephrosis and CKD. She is managed with bilateral ureteral stents, last exchanged 5/27/21. She has a neurogenic bladder and history of retention. She is on bethanechol. CKD is reportedly progressive, approaching dialysis. She has an urgent need for stent exchange and upsizing. See the problem list.     Problem List  Date Reviewed: 11/30/2021          Codes Class Noted    Anemia ICD-10-CM: D64.9  ICD-9-CM: 285.9  8/4/2021        Mixed stress and urge urinary incontinence ICD-10-CM: N39.46  ICD-9-CM: 788.33  5/21/2021    Overview Signed 6/18/2021  9:29 AM by Bella Ca NP     5/21/21: NGB secondary to MS. Manages with pads, changes them 2 times/day. Neurogenic bladder ICD-10-CM: N31.9  ICD-9-CM: 596.54  5/20/2021    Overview Addendum 11/22/2021 10:11 AM by Bella Ca NP     She has a neurogenic bladder secondary to multiple sclerosis with previous history of urinary retention along with bilateral hydronephrosis treated with bilateral stent placement on 12/7/2020 by Dr. Sheryl Lopez with assistance from Dr. Bangura. Stent exchange on 5/27/21.              Bladder diverticulum ICD-10-CM: N32.3  ICD-9-CM: 596.3  5/20/2021    Overview Signed 5/20/2021 10:44 AM by Bella Ca NP     US 4/7/21 notes an anechoic fluid collection located to the left of the bladder apparently representing a moderately large bladder diverticulum. Acute cystitis without hematuria ICD-10-CM: N30.00  ICD-9-CM: 595.0  5/20/2021    Overview Addendum 6/18/2021  9:28 AM by Pal Barone NP     9/17/20: cultrue with Streptococci, beta hemolyticgroup B(predominating) and E coli, greater than 100,000 colony forming units per mL.     11/13/20: culture with Citrobacter freundii,  greater than 100,000 colony forming units per mL.     12/7/21: culture with Pseudomonas aeruginosa, greater than 100,000 colony forming units per mL.    3/21/21: culture with Beta hemolytic Streptococcus, group B, greater than 100,000 colony forming units per mL.    5/21/21: culture with Beta hemolytic Streptococcus, group B, greater than 100,000 colony forming units per mL. She was prescribed Macrobid x 2 weeks. Retained ureteral stent ICD-10-CM: Z96.0  ICD-9-CM: V43.89  5/20/2021    Overview Addendum 11/22/2021 10:15 AM by Pal Barone NP     12/7/2020 bilateral ureteral stent placement by Dr. Adore Vaughan with assistance from Dr. Lg Lopez. 5/27/21: stent exchange. Treated with nitrofurantoin on 5/27/21. CKD (chronic kidney disease) ICD-10-CM: N18.9  ICD-9-CM: 585.9  5/20/2021    Overview Addendum 6/18/2021  9:28 AM by Pal Barone NP     CKD, followed by Dr. Clarke Canchola, Nephrology. 12/3/2020 Cr was 1.86  4/26/21 Cr was 3.8             Fracture ICD-10-CM: T14. 8XXA  ICD-9-CM: 829.0  3/22/2021        H/O: hysterectomy ICD-10-CM: Z90.710  ICD-9-CM: V88.01  3/22/2021        Macular degeneration, right eye ICD-10-CM: H35.30  ICD-9-CM: 362.50  3/22/2021        Shingles ICD-10-CM: B02.9  ICD-9-CM: 053.9  3/22/2021        [facility] ([facility] ICD-10-CM: G35  ICD-9-CM: 976  3/22/2021        Pain ICD-10-CM: R52  ICD-9-CM: 780.96  3/22/2021        Weakness ICD-10-CM: R53.1  ICD-9-CM: 780.79  3/22/2021        Hydronephrosis ICD-10-CM: N13.30  ICD-9-CM: 967  12/7/2020    Overview Addendum 11/22/2021 10:14 AM by Johnson Calderon NP     US done on 4/7/21 reads bilateral ureteral stents with moderate hydronephrosis despite the placement of stents ([id number]). 5/21/21: persistent hydronephrosis with CKD, MS and bladder diverticulum. 5/27/21 ureteral stent exchange. 6F double J stent bilaterally. Past Medical History: Allergies   Allergen Reactions    Latex, Natural Rubber Itching     Per patient: \"just regular, no anaphylaxis\"      Betaseron [Interferon Beta-1b] Rash    Tysabri [Natalizumab] Hives      Prior to Admission medications    Medication Sig Start Date End Date Taking? Authorizing Provider   Gilenya 0.5 mg cap  7/16/21  Yes Provider, Historical   vit A/vit C/vit E/zinc/copper (PRESERVISION AREDS PO) Take  by mouth two (2) times a day. Yes Provider, Historical   bisacodyL (Dulcolax, bisacodyl,) 5 mg EC tablet Take 5 mg by mouth daily as needed for Constipation. Yes Provider, Historical   cholecalciferol (Vitamin D3) 25 mcg (1,000 unit) cap Take 500 Units by mouth daily. Yes Provider, Historical   sodium bicarbonate 650 mg tablet Take  by mouth four (4) times daily. Yes Provider, Historical   tamsulosin (FLOMAX) 0.4 mg capsule Take 1 Capsule by mouth daily. 9/1/21  Yes Jaciel Crespo MD   bethanechol (URECHOLINE) 25 mg tablet Take 1 Tablet by mouth Before breakfast, lunch, and dinner. 9/1/21  Yes Jaciel Crespo MD   NALTREXONE TAKE ONE CAPSULE (4.5mg) BY MOUTH DAILY AT BEDTIME as directed 2/2/21  Yes Provider, Historical   amitriptyline (ELAVIL) 25 mg tablet Take 25 mg by mouth nightly. Yes Provider, Historical   senna (Senna) 8.6 mg tablet Take 1 Tab by mouth daily.    Yes Provider, Historical   calcium carb/vit D3/minerals (Calcium Carbonate-Vit D3-Min) 600 mg (1,500 mg)-200 unit chew  2/12/20  Yes Provider, Historical   Dalfampridine-ER 12 HR (AMPYRA) 10 mg tablet Take  by mouth every twelve (12) hours. Yes Provider, Historical   gabapentin (NEURONTIN) 300 mg capsule Take 300 mg by mouth three (3) times daily. PT states BID   Yes Provider, Historical   tiZANidine (ZANAFLEX) 2 mg tablet Take  by mouth three (3) times daily. Yes Provider, Historical   pantoprazole (Protonix) 40 mg tablet Take 40 mg by mouth daily. Patient not taking: Reported on 2021    Provider, Historical   OTHER daily. X21 days after Solumedrol infusion   Indications: Methylprednisolone 4mg  Patient not taking: Reported on 2021    Provider, Historical      PMHx:  has a past medical history of Arthritis, Burning with urination, Chronic kidney disease, CKD (chronic kidney disease), GERD (gastroesophageal reflux disease), MS (multiple sclerosis) ([facility] [address] Nonneurogenic neurogenic bladder dysfunction, and S/P ureteral stent placement. PSurgHx:  has a past surgical history that includes hx hysterectomy; hx femur fracture tx; hx urological (2020); hx urological (2020); hx urological (2020); hx urological (Bilateral, 2020); hx urological (2021); hx urological (Bilateral, 2021); hx urological (Bilateral, 2021); hx ankle fracture tx (Right); pr femur/knee surg unlisted (Right); and hx other surgical.  PSocHx:  reports that she has never smoked. She has never used smokeless tobacco. She reports current alcohol use. She reports that she does not use drugs. ROS:  Admission ROS by Sherri Vernon MD from 3/3/2022 were reviewed with the patient and changes (other than per HPI) include: none. Physical Exam:            Vitals[de-identified]    Temp (24hrs), Av.1 °F (36.7 °C), Min:98.1 °F (36.7 °C), Max:98.1 °F (36.7 °C)   Blood pressure (!) 155/97, pulse 83, temperature 98.1 °F (36.7 °C), resp. rate 18, height 5' 3\" (1.6 m), weight 159 lb (72.1 kg), SpO2 100 %. Estimated body mass index is 28.17 kg/m² as calculated from the following:    Height as of this encounter: 5' 3\" (1.6 m).     Weight as of this encounter: 159 lb (72.1 kg). I&O's:    No intake/output data recorded. General Well developed, in NAD   Conjunctiva/Lids Normal without gross defects   Neck Supple without obvious, masses   Respiratory Effort Breathing easily, no audible wheezing, rhonchi, stridor   CV RRR   Abdomen / Flank Soft, non tender without guarding or rebound, without obvious masses, no CVA tenderness   Neurologic Grossly normal without focal deficits           Assessment/Plan:  Bilateral hydronephrosis and neurogenic bladder. Ureteral obstruction at the level of the UVJ managed with stents. CKD progressive, overdue for stent changes. Approaching dialysis. She presents for urgent evaluation and management. The patient was counseled on the risks, benefits and expected course of surgery. Surgery has risks of bleeding, infection, injury, pain, death or other consequences. Perioperative medications and antibiotic use were discussed including the potential for reactions and side effects. Some specific risks of surgery were discussed as well.        Cystoscopy, retrograde pyelograms  BILATERAL  ureteral stent exchange    Signed By: Devika Hill MD  - March 3, 2022

## 2022-03-03 NOTE — ANESTHESIA PREPROCEDURE EVALUATION
Relevant Problems   RENAL FAILURE   (+) CKD (chronic kidney disease)   (+) Hydronephrosis      HEMATOLOGY   (+) Anemia       Anesthetic History   No history of anesthetic complications            Review of Systems / Medical History  Patient summary reviewed, nursing notes reviewed and pertinent labs reviewed    Pulmonary  Within defined limits                 Neuro/Psych         Neuromuscular disease    Comments: Multiple Sclerosis Cardiovascular  Within defined limits                     GI/Hepatic/Renal     GERD    Renal disease (Patient states that she was told that she will be starting \"imminently\" following her planned CYSTO today, 3/3/2022.): ESRD      Comments: Bladder dysfunction Endo/Other        Obesity and arthritis     Other Findings            Physical Exam    Airway  Mallampati: II  TM Distance: 4 - 6 cm  Neck ROM: normal range of motion   Mouth opening: Normal     Cardiovascular    Rhythm: regular  Rate: normal         Dental    Dentition: Edentulous     Pulmonary  Breath sounds clear to auscultation               Abdominal  GI exam deferred       Other Findings            Anesthetic Plan    ASA: 4  Anesthesia type: general          Induction: Intravenous  Anesthetic plan and risks discussed with: Patient

## 2022-03-03 NOTE — ANESTHESIA POSTPROCEDURE EVALUATION
Procedure(s):  CYSTOURETHROSCOPY, BILATERAL RETROGRADE PYELOGRAM AND BILATERAL URETERAL STENT CHANGE.     general    Anesthesia Post Evaluation      Multimodal analgesia: multimodal analgesia not used between 6 hours prior to anesthesia start to PACU discharge  Patient location during evaluation: PACU  Patient participation: complete - patient participated  Level of consciousness: awake and alert  Pain score: 0  Pain management: adequate  Airway patency: patent  Anesthetic complications: no  Cardiovascular status: acceptable, blood pressure returned to baseline and hemodynamically stable  Respiratory status: acceptable, nonlabored ventilation, spontaneous ventilation, unassisted and room air  Hydration status: acceptable  Post anesthesia nausea and vomiting:  none  Final Post Anesthesia Temperature Assessment:  Normothermia (36.0-37.5 degrees C)      INITIAL Post-op Vital signs:   Vitals Value Taken Time   /77 03/03/22 0913   Temp 36.7 °C (98 °F) 03/03/22 0905   Pulse 82 03/03/22 0913   Resp 12 03/03/22 0913   SpO2 100 % 03/03/22 0913

## 2022-03-03 NOTE — OP NOTES
UROLOGY OPERATIVE NOTE    Patient: Janel Benavides MRN: [id number]  SSN: [id number]    YOB: 1960  Age: 63 y.o. Sex: female          Pre-operative Diagnosis: Retained ureteral stent [Z96.0]  Hydronephrosis, unspecified hydronephrosis type [N13.30], CKD 5  Post-operative Diagnosis: Retained ureteral stent [Z96.0], CKD 5  Hydronephrosis, unspecified  Procedure: Cystoscopy, retrograde pyelograms  BILATERAL  ureteral stent exchange    Surgeon: Joel Hoskins MD  Assistant: none    Anesthesia:  General  Findings: Interpretation of left retrograde pyelogram:  Dilated left ureter to the UVJ. UPJ with a tight kinking or stricture with a jet of contrast. Moderate dilation and blunting of the calyces. Interpretation of right retrograde pyelogram:  Dilated right ureter to the UVJ. Proximal ureter/ UPJ with tight kinking or narrowing. Moderate dilation and blunting of the calyces. Estimated Blood Loss:       None     Specimens: Urine for culture  Implants:   Implant Name Type Inv. Item Serial No.  Lot No. LRB No. Used Action   STENT URET L24CM DIA8FR HYDR+ TAPR TIP PGTL L SIDEPRT - SN/A  STENT URET L24CM DIA8FR HYDR+ TAPR TIP PGTL L SIDEPRT N/A [id number] [id number] Left 1 Implanted   STENT URET L24CM DIA8FR HYDR+ TAPR TIP PGTL L SIDEPRT - SN/A  STENT URET L24CM DIA8FR HYDR+ TAPR TIP PGTL L SIDEPRT N/A [id number] [id number] Right 1 Implanted     Complications: none           Procedure Details: The patient was seen in the pre-operative area. The risks, benefits, complications, alternative treatment options, and expected outcomes were again discussed with the patient. The possibilities of reaction to medication, pain, infection, bleeding, major cardiovascular event, death, damage to surrounding structures were specifically addressed. Informed consent was obtained.       Upon arrival to the operative suite, the patient, procedure, and side were confirmed via a pre-operative \"time-out\". All were in agreement. The patient was carefully positioned and anesthesia was undertaken. Sterile prep and drape was accomplished. The patient was in the lithotomy position. Using a 21 Gabonese cystoscope with 30 and 70 degree lenses complete cystoscopy was performed. The urethra was unremarkable. The bladder mucosa was normal in appearance without tumors, lesions or trabeculation seen. The ureteral orifices were seen on either side. Bilateral 6F ureteral stents present. The bladder urine was cloudy and was collected for culture. Using stent graspers, the left and right ureteral stents were removed. Using an open-ended catheter the left ureteral orifice was cannulated. Using Isovue a gentle retrograde pyelogram was performed. The ureter appeared dilated and patent to the ureterovesical junction without distal stricturing. At the ureteropelvic junction the ureter was strictured or kinked. The calyces were blunted and dilated. A 0.035 guidewire was inserted to the renal pelvis. Over the wire a 8F x 26cm JJ ureteral stent was placed. The curl was in a mid calyx and the distal end in the bladder in position. Using an open-ended catheter the right ureteral orifice was cannulated. Using Isovue a gentle retrograde pyelogram was performed. The ureter appeared dilated and patent to the ureterovesical junction without distal stricturing. At the ureteropelvic junction or just distal the ureter was strictured or kinked. The calyces were blunted. A 0.035 guidewire was inserted to the renal pelvis. Over the wire a 8F x 26cm JJ ureteral stent was placed. The curl was in a mid calyx and the distal end in the bladder in position. The bladder was drained and the scope was removed. The patient was transported in stable condition to recovery.      Devika Hill MD

## 2022-03-03 NOTE — DISCHARGE INSTRUCTIONS
The procedure went smoothly. An antibiotic was sent in to your pharmacy - nitrofurantoin.  -Dr Suzanne Springer have a stent in place. This is a small, flexible plastic internal tube placed into the ureter to promote drainage of your kidney down to the bladder.  The amount of time that the stent remains in place is variable.  If you need to keep the stent, we will discuss stent exchange at your follow up visit.  Anytime you have a stent in place, it is important that you urinate often. It is not uncommon to feel like you need to go more frequently or have a fullness in your abdomen or flank.  Do not hold your urine. Do not allow your bladder to get full and uncomfortable. Empty your bladder often and stay well hydrated. THINGS YOU MAY ENCOUNTER AFTER YOUR STENT PLACEMENT   Blood in the urine. You may have blood in your urine off and on for several weeks after a urologic procedure or with a stent in place. The blood can make your urine look tea-colored or pink. This is normal.  Drink plenty of water to flush that through and decrease your strenuous activity. If you are seeing blood in the urine, avoid NSAID's for discomfort as they can exacerbate this. Use Tylenol for pain management.  Nausea/Fatigue. You may experience some transient nausea in the first few days following surgery with anesthesia. You may also feel fatigued after surgery which will subside with time (4-8 weeks).  Urinary frequency/urgency. This is normal with a stent in place. Void often. Do not avoid your urge. Empty your bladder frequently to prevent urine from backing up into the kidney via the stent.     WHEN TO CALL THE DOCTOR:   You have a fever over 100.5F   You have nausea or vomiting for more than 24 hours   It becomes hard to breathe   You cannot urinate   You develop swelling, redness, or temperature changes in one or both of your legs

## 2022-03-04 LAB
BACTERIA SPEC CULT: NORMAL
SPECIAL REQUESTS,SREQ: NORMAL

## 2022-03-18 PROBLEM — N39.46 MIXED STRESS AND URGE URINARY INCONTINENCE: Status: ACTIVE | Noted: 2021-05-21

## 2022-03-18 PROBLEM — T14.8XXA FRACTURE: Status: ACTIVE | Noted: 2021-03-22

## 2022-03-18 PROBLEM — D64.9 ANEMIA: Status: ACTIVE | Noted: 2021-08-04

## 2022-03-18 PROBLEM — Z96.0 RETAINED URETERAL STENT: Status: ACTIVE | Noted: 2021-05-20

## 2022-03-18 PROBLEM — B02.9 SHINGLES: Status: ACTIVE | Noted: 2021-03-22

## 2022-03-19 PROBLEM — N31.9 NEUROGENIC BLADDER: Status: ACTIVE | Noted: 2021-05-20

## 2022-03-19 PROBLEM — N13.30 HYDRONEPHROSIS: Status: ACTIVE | Noted: 2020-12-07

## 2022-03-19 PROBLEM — Z90.710 H/O: HYSTERECTOMY: Status: ACTIVE | Noted: 2021-03-22

## 2022-03-19 PROBLEM — G35 MULTIPLE SCLEROSIS (HCC): Status: ACTIVE | Noted: 2021-03-22

## 2022-03-19 PROBLEM — N30.00 ACUTE CYSTITIS WITHOUT HEMATURIA: Status: ACTIVE | Noted: 2021-05-20

## 2022-03-19 PROBLEM — N32.3 BLADDER DIVERTICULUM: Status: ACTIVE | Noted: 2021-05-20

## 2022-03-19 PROBLEM — R52 PAIN: Status: ACTIVE | Noted: 2021-03-22

## 2022-03-19 PROBLEM — R53.1 WEAKNESS: Status: ACTIVE | Noted: 2021-03-22

## 2022-03-20 PROBLEM — N18.9 CKD (CHRONIC KIDNEY DISEASE): Status: ACTIVE | Noted: 2021-05-20

## 2022-03-20 PROBLEM — H35.30 MACULAR DEGENERATION, RIGHT EYE: Status: ACTIVE | Noted: 2021-03-22

## 2022-03-28 NOTE — PROGRESS NOTES
HISTORY OF PRESENT ILLNESS  Macie Morris is a 63 y.o. female. Chief Complaint   Patient presents with    Post OP Follow Up    Hydronephrosis     Past Medical History:  PMHx (including negatives):  has a past medical history of Arthritis, Burning with urination, Chronic kidney disease, CKD (chronic kidney disease), GERD (gastroesophageal reflux disease), MS (multiple sclerosis) ([address] Nonneurogenic neurogenic bladder dysfunction, and S/P ureteral stent placement. PSurgHx:  has a past surgical history that includes hx hysterectomy; hx femur fracture tx; hx ankle fracture tx (Right); pr femur/knee surg unlisted (Right); hx other surgical; hx urological (12/07/2020); hx urological (12/07/2020); hx urological (12/07/2020); hx urological (Bilateral, 12/07/2020); hx urological (05/27/2021); hx urological (Bilateral, 05/27/2021); hx urological (Bilateral, 05/27/2021); hx urological (03/03/2022); hx urological (Bilateral, 03/03/2022); and hx urological (Bilateral, 03/03/2022). PSocHx:  reports that she has never smoked. She has never used smokeless tobacco. She reports current alcohol use. She reports that she does not use drugs. HX of neurogenic bladder, incontinence, hydronephrosis. Managed with ureteral stents bilaterally. She is s/p cystoscopy, retrograde pyelograms, BILATERAL ureteral stent exchange on 3/3/22. Findings: Interpretation of left retrograde pyelogram:  Dilated left ureter to the UVJ. UPJ with a tight kinking or stricture with a jet of contrast. Moderate dilation and blunting of the calyces.      Interpretation of right retrograde pyelogram:  Dilated right ureter to the UVJ. Proximal ureter/ UPJ with tight kinking or narrowing. Moderate dilation and blunting of the calyces. Bladder urine cloudy, sent for culture; no growth. She is here today in follow up. She has chronic kidney disease and close to dialysis. She has follow-up with Dr. Samira Moon later today.   She had lab work done last week which her and her creatinine went from 6.5 and January to 4.53 after stent change. Chronic Conditions Addressed Today     1. Hydronephrosis     Overview      US done on 4/7/21 reads bilateral ureteral stents with moderate hydronephrosis despite the placement of stents ([id number]/2020). 5/21/21: persistent hydronephrosis with CKD, MS and bladder diverticulum. 5/27/21 ureteral stent exchange. 6F double J stent bilaterally. 3/3/22: Cystoscopy, retrograde pyelograms, BILATERAL ureteral stent exchange. 2. Multiple sclerosis ([address]    3. Neurogenic bladder     Overview      She has a neurogenic bladder secondary to multiple sclerosis with previous history of urinary retention along with bilateral hydronephrosis treated with bilateral stent placement on 12/7/2020 by Dr. Delmar Hickman with assistance from Dr. Anthony Melara. She continues to be managed with ureteral stents. 4. Acute cystitis without hematuria     Overview      9/17/20: cultrue with Streptococci, beta hemolyticgroup B(predominating) and E coli, greater than 100,000 colony forming units per mL.     11/13/20: culture with Citrobacter freundii,  greater than 100,000 colony forming units per mL.     12/7/21: culture with Pseudomonas aeruginosa, greater than 100,000 colony forming units per mL.    3/21/21: culture with Beta hemolytic Streptococcus, group B, greater than 100,000 colony forming units per mL.    5/21/21: culture with Beta hemolytic Streptococcus, group B, greater than 100,000 colony forming units per mL. She was prescribed Macrobid x 2 weeks. 3/3/22: urine was cloudy at time of stent exchange. NO growth on culture. 5. Retained ureteral stent - Primary     Overview      12/7/2020 bilateral ureteral stent placement by Dr. Harris Escalante with assistance from Dr. Stephanie Valle. 5/27/21: stent exchange.   Treated with nitrofurantoin on 5/27/21.    3/3/22: Cystoscopy, retrograde pyelograms, BILATERAL ureteral stent exchange. 6. Mixed stress and urge urinary incontinence     Overview      NGB secondary to MS. Manages with pads, changes them 2 times/day. Stable. ROS  Patient denies the symptoms of COVID-19 per routine screening guidelines. Physical Exam    ASSESSMENT and PLAN  Diagnoses and all orders for this visit:    1. Retained ureteral stent  Assessment & Plan: We will continue stent changes. She appeared to have worse renal function after 7 months. Plan on follow-up in 5 months with stent changes shortly afterwards. 2. Hydronephrosis, unspecified hydronephrosis type  Assessment & Plan: On her left side she has a proximal ureteral stricture or UPJ stricture as well as stricturing at the UVJ with hydroureter. She has similar findings on the right side. She is managed with ureteral stents. We discussed addressing her strictures which would require a pyeloplasty and ureteral reimplantation on both sides. Given her chronic kidney disease and potential for dialysis in the future, we will continue management with stents. 3. Neurogenic bladder  Assessment & Plan:   She is on tamsulosin and bethanechol. I advised her to void frequently and not postpone urination. I advised her to set an alarm in the night to empty her bladder if she has distending her bladder at nighttime. 4. Mixed stress and urge urinary incontinence  Assessment & Plan:  She manages with a pure wick at home. 5. Acute cystitis without hematuria  Comments:  No growth on this culture. No new symptoms. 6. Multiple sclerosis ([facility] [address]    7. Chronic kidney disease, unspecified CKD stage  Assessment & Plan:  Chronic kidney disease followed by Dr. Sundar Degroot. She has bilateral ureteral stents in place now. Her creatinine was rising after about 7 months since prior stent change. We will try to change her stents more frequently.            Follow-up and Dispositions    · Return in about 4 months (around 8/1/2022). Kirill Walker may have a reminder for a \"due or due soon\" health maintenance. The patient has been encouraged to contact their primary care provider for follow-up on this health maintenance or other necessary and/or routine health screening.      Romi Patel MD

## 2022-03-29 ENCOUNTER — OFFICE VISIT (OUTPATIENT)
Dept: UROLOGY | Age: 62
End: 2022-03-29
Payer: COMMERCIAL

## 2022-03-29 VITALS
RESPIRATION RATE: 16 BRPM | HEIGHT: 63 IN | TEMPERATURE: 98 F | OXYGEN SATURATION: 98 % | DIASTOLIC BLOOD PRESSURE: 72 MMHG | HEART RATE: 78 BPM | SYSTOLIC BLOOD PRESSURE: 123 MMHG | WEIGHT: 159 LBS | BODY MASS INDEX: 28.17 KG/M2

## 2022-03-29 DIAGNOSIS — N13.30 HYDRONEPHROSIS, UNSPECIFIED HYDRONEPHROSIS TYPE: ICD-10-CM

## 2022-03-29 DIAGNOSIS — N30.00 ACUTE CYSTITIS WITHOUT HEMATURIA: ICD-10-CM

## 2022-03-29 DIAGNOSIS — N31.9 NEUROGENIC BLADDER: ICD-10-CM

## 2022-03-29 DIAGNOSIS — N39.46 MIXED STRESS AND URGE URINARY INCONTINENCE: ICD-10-CM

## 2022-03-29 DIAGNOSIS — G35 MULTIPLE SCLEROSIS (HCC): ICD-10-CM

## 2022-03-29 DIAGNOSIS — Z96.0 RETAINED URETERAL STENT: Primary | ICD-10-CM

## 2022-03-29 DIAGNOSIS — N18.9 CHRONIC KIDNEY DISEASE, UNSPECIFIED CKD STAGE: ICD-10-CM

## 2022-03-29 PROCEDURE — 99214 OFFICE O/P EST MOD 30 MIN: CPT | Performed by: UROLOGY

## 2022-03-29 NOTE — ASSESSMENT & PLAN NOTE
She is on tamsulosin and bethanechol. I advised her to void frequently and not postpone urination. I advised her to set an alarm in the night to empty her bladder if she has distending her bladder at nighttime.

## 2022-03-29 NOTE — ASSESSMENT & PLAN NOTE
Chronic kidney disease followed by Dr. Dolly Okeefe. She has bilateral ureteral stents in place now. Her creatinine was rising after about 7 months since prior stent change. We will try to change her stents more frequently.

## 2022-03-29 NOTE — PROGRESS NOTES
Chief Complaint   Patient presents with    Post OP Follow Up    Hydronephrosis             Visit Vitals  /72 (BP 1 Location: Left upper arm, BP Patient Position: Sitting)   Pulse 78   Temp 98 °F (36.7 °C) (Temporal)   Resp 16   Ht 5' 3\" (1.6 m)   Wt 159 lb (72.1 kg)   LMP  (LMP Unknown)   SpO2 98%   BMI 28.17 kg/m²

## 2022-03-29 NOTE — LETTER
3/29/2022    Patient: Carlito Hinds   YOB: 1960   Date of Visit: 3/29/2022     Sid Wagoner NP  [address] [facility]  [phone]  Via Fax: [phone], [place]  [address] [facility] [address]  Via Fax: [phone]    Dear ЮЛИЯ Feliz MD,      Thank you for referring Ms. Chip Prado to [facility] for evaluation. My notes for this consultation are attached. If you have questions, please do not hesitate to call me. I look forward to following your patient along with you.       Sincerely,    Lalo Leach MD

## 2022-03-29 NOTE — ASSESSMENT & PLAN NOTE
We will continue stent changes. She appeared to have worse renal function after 7 months. Plan on follow-up in 5 months with stent changes shortly afterwards.

## 2022-03-29 NOTE — ASSESSMENT & PLAN NOTE
On her left side she has a proximal ureteral stricture or UPJ stricture as well as stricturing at the UVJ with hydroureter. She has similar findings on the right side. She is managed with ureteral stents. We discussed addressing her strictures which would require a pyeloplasty and ureteral reimplantation on both sides. Given her chronic kidney disease and potential for dialysis in the future, we will continue management with stents. Clothing

## 2022-04-08 ENCOUNTER — VIRTUAL VISIT (OUTPATIENT)
Dept: UROLOGY | Age: 62
End: 2022-04-08
Payer: COMMERCIAL

## 2022-04-08 DIAGNOSIS — N18.9 CHRONIC KIDNEY DISEASE, UNSPECIFIED CKD STAGE: ICD-10-CM

## 2022-04-08 DIAGNOSIS — N39.46 MIXED STRESS AND URGE URINARY INCONTINENCE: ICD-10-CM

## 2022-04-08 DIAGNOSIS — Z96.0 RETAINED URETERAL STENT: ICD-10-CM

## 2022-04-08 DIAGNOSIS — N13.1 HYDRONEPHROSIS WITH URETERAL STRICTURE, NOT ELSEWHERE CLASSIFIED: ICD-10-CM

## 2022-04-08 DIAGNOSIS — N31.9 NEUROGENIC BLADDER: ICD-10-CM

## 2022-04-08 PROBLEM — M79.605 PAIN IN LEFT LEG: Status: ACTIVE | Noted: 2022-04-08

## 2022-04-08 PROCEDURE — 99215 OFFICE O/P EST HI 40 MIN: CPT | Performed by: UROLOGY

## 2022-04-08 RX ORDER — ERGOCALCIFEROL 1.25 MG/1
CAPSULE ORAL
COMMUNITY
Start: 2022-03-29

## 2022-04-08 RX ORDER — FUROSEMIDE 40 MG/1
40 TABLET ORAL AS NEEDED
COMMUNITY
Start: 2022-03-29

## 2022-04-08 NOTE — ASSESSMENT & PLAN NOTE
Stable CKD to improved per pt after her last nephrology visit. Her ureteral obstruction and neurogenic bladder are a concern, and may contribute to ongoing renal dysfunction or progression of CKD to ESRD. We will try to manage her ureteral strictures and neurogenic bladder.

## 2022-04-08 NOTE — ASSESSMENT & PLAN NOTE
She has bilateral ureteral obstruction at the level of the UVJ. She has developed chronic kidney disease. We discussed ureteral reimplantation to help with management. The patient was counseled on the risks, benefits and expected course of surgery. Surgery has risks of bleeding, infection, injury, pain, death or other consequences. Perioperative medications and antibiotic use were discussed including the potential for reactions and side effects. Some specific risks of surgery were discussed as well. She wishes to proceed to bilateral ureteral reimplantation, robotic assisted laparoscopic approach.  Cysto/ BRP/ stent changes at that time

## 2022-04-08 NOTE — PROGRESS NOTES
HISTORY OF PRESENT ILLNESS  Hank Chairez is a 63 y.o. female. has a past medical history of Arthritis, Burning with urination, Chronic kidney disease, CKD (chronic kidney disease), GERD (gastroesophageal reflux disease), MS (multiple sclerosis) ([facility] [address] Nonneurogenic neurogenic bladder dysfunction, and S/P ureteral stent placement. has a past surgical history that includes hx hysterectomy; hx femur fracture tx; hx ankle fracture tx (Right); pr femur/knee surg unlisted (Right); hx other surgical; hx urological (12/07/2020); hx urological (12/07/2020); hx urological (12/07/2020); hx urological (Bilateral, 12/07/2020); hx urological (05/27/2021); hx urological (Bilateral, 05/27/2021); hx urological (Bilateral, 05/27/2021); hx urological (03/03/2022); hx urological (Bilateral, 03/03/2022); and hx urological (Bilateral, 03/03/2022). Chief Complaint   Patient presents with    Follow-up    Other     discuss pyeloplasty    Hank Call, was evaluated through a synchronous (real-time) audio-video encounter. The patient (or guardian if applicable) is aware that this is a billable service, which includes applicable co-pays. This Virtual Visit was conducted with patient's (and/or legal guardian's) consent. The visit was conducted pursuant to the emergency declaration under the [address] [place] [address] authority and the [employer] General Act. Patient identification was verified, and a caregiver was present when appropriate. The patient was located in a state where the provider was licensed to provide care. HPI  Chronic Conditions Addressed Today     1. Hydronephrosis     Overview      US done on 4/7/21 reads bilateral ureteral stents with moderate hydronephrosis despite the placement of stents ([id number]). 5/21/21: persistent hydronephrosis with CKD, MS and bladder diverticulum. 5/27/21 ureteral stent exchange.  6F double J stent bilaterally. 3/3/22: Cystoscopy, retrograde pyelograms, BILATERAL ureteral stent exchange. Current Assessment & Plan       She has bilateral ureteral obstruction at the level of the UVJ. She has developed chronic kidney disease. We discussed ureteral reimplantation to help with management. The patient was counseled on the risks, benefits and expected course of surgery. Surgery has risks of bleeding, infection, injury, pain, death or other consequences. Perioperative medications and antibiotic use were discussed including the potential for reactions and side effects. Some specific risks of surgery were discussed as well. She wishes to proceed to bilateral ureteral reimplantation, robotic assisted laparoscopic approach. Cysto/ BRP/ stent changes at that time          Relevant Medications     ergocalciferol (ERGOCALCIFEROL) 1,250 mcg (50,000 unit) capsule     furosemide (LASIX) 40 mg tablet    2. Neurogenic bladder     Overview      She has a neurogenic bladder secondary to multiple sclerosis with previous history of urinary retention along with bilateral hydronephrosis treated with bilateral stent placement on 12/7/2020 by Dr. Chelsea Alvarado with assistance from Dr. Dilip Sharpe. She continues to be managed with ureteral stents. Current Assessment & Plan       She has a history of neurogenic bladder with multiple sclerosis. She has a thickened bladder. She is on bethanechol tid. Overactivity or high pressure storage can be a concern. I advised her to try to wean to twice a day and see if she still needs it. Relevant Medications     ergocalciferol (ERGOCALCIFEROL) 1,250 mcg (50,000 unit) capsule     furosemide (LASIX) 40 mg tablet    3. Retained ureteral stent     Overview      12/7/2020 bilateral ureteral stent placement by Dr. Jerry Gomez with assistance from Dr. Zee Hudson. 5/27/21: stent exchange.   Treated with nitrofurantoin on 5/27/21.    3/3/22: Cystoscopy, retrograde pyelograms, BILATERAL ureteral stent exchange. Current Assessment & Plan       We will continue ureteral stent perioperatively and plan postop cysto/ BRP prior to stent removal         4. CKD (chronic kidney disease)     Overview      CKD, followed by Dr. Emmie Alvarado, Nephrology. 12/3/2020 Cr was 1.86  4/26/21 Cr was 3.8          Current Assessment & Plan       Stable CKD to improved per pt after her last nephrology visit. Her ureteral obstruction and neurogenic bladder are a concern, and may contribute to ongoing renal dysfunction or progression of CKD to ESRD. We will try to manage her ureteral strictures and neurogenic bladder. Relevant Medications     ergocalciferol (ERGOCALCIFEROL) 1,250 mcg (50,000 unit) capsule     furosemide (LASIX) 40 mg tablet    5. Mixed stress and urge urinary incontinence     Overview      NGB secondary to MS. Manages with pads, changes them 2 times/day. Stable. Relevant Medications     ergocalciferol (ERGOCALCIFEROL) 1,250 mcg (50,000 unit) capsule     furosemide (LASIX) 40 mg tablet       HX of neurogenic bladder, incontinence, hydronephrosis. Managed with ureteral stents bilaterally. She was last seen on 3/29/2022.     She is s/p cystoscopy, retrograde pyelograms, BILATERAL ureteral stent exchange on 3/3/22. Findings: Interpretation of left retrograde pyelogram:  Dilated left ureter to the UVJ.  UPJ with a tight kinking or stricture with a jet of contrast. Moderate dilation and blunting of the calyces.      Interpretation of right retrograde pyelogram:  Dilated right ureter to the UVJ.  Proximal ureter/ UPJ with tight kinking or narrowing.  Moderate dilation and blunting of the calyces.     Bladder urine cloudy, sent for culture; no growth.     She has chronic kidney disease and close to dialysis. She has follow-up with Dr. Beth Vicente later today.   She had lab work done last week which her and her creatinine went from 6.5 and January to 4.53 after stent change. Past Medical History:    PMHx (including negatives):  has a past medical history of Arthritis, Burning with urination, Chronic kidney disease, CKD (chronic kidney disease), GERD (gastroesophageal reflux disease), MS (multiple sclerosis) ([facility] [address] Nonneurogenic neurogenic bladder dysfunction, and S/P ureteral stent placement. PSurgHx:  has a past surgical history that includes hx hysterectomy; hx femur fracture tx; hx ankle fracture tx (Right); pr femur/knee surg unlisted (Right); hx other surgical; hx urological (12/07/2020); hx urological (12/07/2020); hx urological (12/07/2020); hx urological (Bilateral, 12/07/2020); hx urological (05/27/2021); hx urological (Bilateral, 05/27/2021); hx urological (Bilateral, 05/27/2021); hx urological (03/03/2022); hx urological (Bilateral, 03/03/2022); and hx urological (Bilateral, 03/03/2022). PSocHx:  reports that she has never smoked. She has never used smokeless tobacco. She reports current alcohol use. She reports that she does not use drugs. Review of Systems   All other systems reviewed and are negative. Physical Exam  Allergies   Allergen Reactions    Latex, Natural Rubber Itching     Per patient: \"just regular, no anaphylaxis\"      Betaseron [Interferon Beta-1b] Rash    Tysabri [Natalizumab] Hives      Prior to Admission medications    Medication Sig Start Date End Date Taking? Authorizing Provider   ergocalciferol (ERGOCALCIFEROL) 1,250 mcg (50,000 unit) capsule TAKE 1 CAPSULE BY MOUTH EVERY MONTH FOR 4 MONTHS 3/29/22  Yes Provider, Historical   furosemide (LASIX) 40 mg tablet Take 40 mg by mouth two (2) times a day. 3/29/22  Yes Provider, Historical   Gilenya 0.5 mg cap  7/16/21  Yes Provider, Historical   vit A/vit C/vit E/zinc/copper (PRESERVISION AREDS PO) Take  by mouth two (2) times a day. Yes Provider, Historical   bisacodyL (Dulcolax, bisacodyl,) 5 mg EC tablet Take 5 mg by mouth daily as needed for Constipation.    Yes Provider, Historical   cholecalciferol (Vitamin D3) 25 mcg (1,000 unit) cap Take 500 Units by mouth daily. Yes Provider, Historical   sodium bicarbonate 650 mg tablet Take  by mouth four (4) times daily. Yes Provider, Historical   tamsulosin (FLOMAX) 0.4 mg capsule Take 1 Capsule by mouth daily. 9/1/21  Yes Martin Sanchez MD   bethanechol (URECHOLINE) 25 mg tablet Take 1 Tablet by mouth Before breakfast, lunch, and dinner. 9/1/21  Yes Mratin Sanchez MD   NALTREXONE TAKE ONE CAPSULE (4.5mg) BY MOUTH DAILY AT BEDTIME as directed 2/2/21  Yes Provider, Historical   amitriptyline (ELAVIL) 25 mg tablet Take 25 mg by mouth nightly. Yes Provider, Historical   senna (Senna) 8.6 mg tablet Take 1 Tab by mouth daily. Yes Provider, Historical   calcium carb/vit D3/minerals (Calcium Carbonate-Vit D3-Min) 600 mg (1,500 mg)-200 unit chew  2/12/20  Yes Provider, Historical   Dalfampridine-ER 12 HR (AMPYRA) 10 mg tablet Take  by mouth every twelve (12) hours. Yes Provider, Historical   gabapentin (NEURONTIN) 300 mg capsule Take 300 mg by mouth three (3) times daily. PT states BID   Yes Provider, Historical   tiZANidine (ZANAFLEX) 2 mg tablet Take  by mouth three (3) times daily. Yes Provider, Historical   pantoprazole (Protonix) 40 mg tablet Take 40 mg by mouth daily. Patient not taking: Reported on 12/14/2021    Provider, Historical   OTHER daily. X21 days after Solumedrol infusion   Indications: Methylprednisolone 4mg  Patient not taking: Reported on 11/30/2021    Provider, Historical        ASSESSMENT and PLAN  Diagnoses and all orders for this visit:    1. Hydronephrosis with ureteral stricture, not elsewhere classified  Assessment & Plan:   She has bilateral ureteral obstruction at the level of the UVJ. She has developed chronic kidney disease. We discussed ureteral reimplantation to help with management. The patient was counseled on the risks, benefits and expected course of surgery.  Surgery has risks of bleeding, infection, injury, pain, death or other consequences. Perioperative medications and antibiotic use were discussed including the potential for reactions and side effects. Some specific risks of surgery were discussed as well. She wishes to proceed to bilateral ureteral reimplantation, robotic assisted laparoscopic approach. Cysto/ BRP/ stent changes at that time      2. Neurogenic bladder  Assessment & Plan:   She has a history of neurogenic bladder with multiple sclerosis. She has a thickened bladder. She is on bethanechol tid. Overactivity or high pressure storage can be a concern. I advised her to try to wean to twice a day and see if she still needs it. 3. Mixed stress and urge urinary incontinence    4. Retained ureteral stent  Assessment & Plan: We will continue ureteral stent perioperatively and plan postop cysto/ BRP prior to stent removal      5. Chronic kidney disease, unspecified CKD stage  Assessment & Plan:   Stable CKD to improved per pt after her last nephrology visit. Her ureteral obstruction and neurogenic bladder are a concern, and may contribute to ongoing renal dysfunction or progression of CKD to ESRD. We will try to manage her ureteral strictures and neurogenic bladder.             Nuha Orantes MD

## 2022-04-08 NOTE — ASSESSMENT & PLAN NOTE
She has a history of neurogenic bladder with multiple sclerosis. She has a thickened bladder. She is on bethanechol tid. Overactivity or high pressure storage can be a concern. I advised her to try to wean to twice a day and see if she still needs it.

## 2022-04-11 PROBLEM — M79.605 PAIN IN LEFT LEG: Status: ACTIVE | Noted: 2022-04-08

## 2022-04-18 ENCOUNTER — TELEPHONE (OUTPATIENT)
Dept: UROLOGY | Age: 62
End: 2022-04-18

## 2022-04-18 NOTE — TELEPHONE ENCOUNTER
Spoke with blayne a nurse working on pt case with the type of insurance she has stating that Dr. Any Rodriguez needs more information about the procedure pt is having. He needed to speak to dr. Bangura about this as well as confirm if this is really a outpatient surgery since it has stent changes. She said that you may contact her at [phone] et. [phone] or [phone] if you have any questions . Dr. Any Rodriguez number to contact him is [phone]. She also stated that he is currently in [place] and the time difference is by 3 hours. He is a availible all day today and tomorrow.

## 2022-04-19 NOTE — TELEPHONE ENCOUNTER
Ziggy Pulliam from [facility] called again wanting to know the status of the info below. I let her know that you sent Dr. Moon Christopher the message and he has been in surgery all day.  She wanted to know when would Dr. Moon Christopher call Dr. Kaylee Grayson so that this case would not get lost. She said if you have any questions you can give her a call at [phone]

## 2022-04-19 NOTE — TELEPHONE ENCOUNTER
I sent to Dr. Emilie Castaneda on [employer] also. Can you make sure to review with him in [place] tomorrow also? Thanks.

## 2022-04-20 ENCOUNTER — TELEPHONE (OUTPATIENT)
Dept: UROLOGY | Age: 62
End: 2022-04-20

## 2022-04-20 NOTE — TELEPHONE ENCOUNTER
Unable to speak with Mary Armstrong. Did leave a message 4/20/22 at 11:30. Dr. Mariya Cruz is unable to be reached that the number provided per Dr. Alexey Vuong who has tried twice. 23 hour observation was confirmed with Dr. Alexey Vuong. Left that information in message to Mary Armstrong. Any additional billing or insurance questions should be directed to hospital per Dr. Alexey Vuong.

## 2022-04-26 ENCOUNTER — TELEPHONE (OUTPATIENT)
Dept: UROLOGY | Age: 62
End: 2022-04-26

## 2022-04-26 ENCOUNTER — OFFICE VISIT (OUTPATIENT)
Dept: UROLOGY | Age: 62
End: 2022-04-26

## 2022-04-26 ENCOUNTER — HOSPITAL ENCOUNTER (OUTPATIENT)
Dept: GENERAL RADIOLOGY | Age: 62
Discharge: HOME | End: 2022-04-26
Attending: UROLOGY

## 2022-04-26 ENCOUNTER — HOSPITAL ENCOUNTER (OUTPATIENT)
Dept: PREADMISSION TESTING | Age: 62
Discharge: HOME | End: 2022-04-26

## 2022-04-26 VITALS
DIASTOLIC BLOOD PRESSURE: 82 MMHG | HEIGHT: 63 IN | OXYGEN SATURATION: 99 % | BODY MASS INDEX: 29.77 KG/M2 | SYSTOLIC BLOOD PRESSURE: 148 MMHG | RESPIRATION RATE: 16 BRPM | HEART RATE: 76 BPM | WEIGHT: 168 LBS | TEMPERATURE: 98.1 F

## 2022-04-26 VITALS
HEIGHT: 63 IN | BODY MASS INDEX: 29.77 KG/M2 | TEMPERATURE: 96.8 F | OXYGEN SATURATION: 100 % | DIASTOLIC BLOOD PRESSURE: 81 MMHG | WEIGHT: 168 LBS | SYSTOLIC BLOOD PRESSURE: 138 MMHG | HEART RATE: 79 BPM

## 2022-04-26 DIAGNOSIS — N13.1 HYDRONEPHROSIS WITH URETERAL STRICTURE, NOT ELSEWHERE CLASSIFIED: ICD-10-CM

## 2022-04-26 DIAGNOSIS — N18.9 CHRONIC KIDNEY DISEASE, UNSPECIFIED CKD STAGE: ICD-10-CM

## 2022-04-26 DIAGNOSIS — N32.3 BLADDER DIVERTICULUM: Primary | ICD-10-CM

## 2022-04-26 DIAGNOSIS — N31.9 NEUROGENIC BLADDER: ICD-10-CM

## 2022-04-26 DIAGNOSIS — Z96.0 RETAINED URETERAL STENT: ICD-10-CM

## 2022-04-26 LAB
ABO + RH BLD: NORMAL
ANION GAP SERPL CALC-SCNC: 4 MMOL/L (ref 5–15)
APPEARANCE UR: ABNORMAL
BACTERIA URNS QL MICRO: NEGATIVE /HPF
BILIRUB UR QL: NEGATIVE
BLOOD GROUP ANTIBODIES SERPL: NEGATIVE
BUN SERPL-MCNC: 64 MG/DL (ref 6–20)
BUN/CREAT SERPL: 10 (ref 12–20)
CA-I BLD-MCNC: 9.1 MG/DL (ref 8.5–10.1)
CHLORIDE SERPL-SCNC: 111 MMOL/L (ref 97–108)
CO2 SERPL-SCNC: 25 MMOL/L (ref 21–32)
COLOR UR: ABNORMAL
CREAT SERPL-MCNC: 6.32 MG/DL (ref 0.55–1.02)
ERYTHROCYTE [DISTWIDTH] IN BLOOD BY AUTOMATED COUNT: 13.2 % (ref 11.5–14.5)
GLUCOSE SERPL-MCNC: 82 MG/DL (ref 65–100)
GLUCOSE UR STRIP.AUTO-MCNC: NEGATIVE MG/DL
HCT VFR BLD AUTO: 34.7 % (ref 35–47)
HGB BLD-MCNC: 10.2 G/DL (ref 11.5–16)
HGB UR QL STRIP: ABNORMAL
KETONES UR QL STRIP.AUTO: NEGATIVE MG/DL
LEUKOCYTE ESTERASE UR QL STRIP.AUTO: ABNORMAL
MCH RBC QN AUTO: 25.8 PG (ref 26–34)
MCHC RBC AUTO-ENTMCNC: 29.4 G/DL (ref 30–36.5)
MCV RBC AUTO: 87.8 FL (ref 80–99)
MRSA DNA SPEC QL NAA+PROBE: NOT DETECTED
NITRITE UR QL STRIP.AUTO: NEGATIVE
NRBC # BLD: 0 K/UL (ref 0–0.01)
NRBC BLD-RTO: 0 PER 100 WBC
PH UR STRIP: 7 [PH] (ref 5–8)
PLATELET # BLD AUTO: 240 K/UL (ref 150–400)
PMV BLD AUTO: 9.6 FL (ref 8.9–12.9)
POTASSIUM SERPL-SCNC: 5.3 MMOL/L (ref 3.5–5.1)
PROT UR STRIP-MCNC: 100 MG/DL
RBC # BLD AUTO: 3.95 M/UL (ref 3.8–5.2)
RBC #/AREA URNS HPF: ABNORMAL /HPF (ref 0–5)
SODIUM SERPL-SCNC: 140 MMOL/L (ref 136–145)
SP GR UR REFRACTOMETRY: 1.01 (ref 1–1.03)
SPECIMEN EXP DATE BLD: NORMAL
UROBILINOGEN UR QL STRIP.AUTO: 0.1 EU/DL (ref 0.1–1)
WBC # BLD AUTO: 3.2 K/UL (ref 3.6–11)
WBC URNS QL MICRO: ABNORMAL /HPF (ref 0–4)

## 2022-04-26 PROCEDURE — 86900 BLOOD TYPING SEROLOGIC ABO: CPT

## 2022-04-26 PROCEDURE — 81001 URINALYSIS AUTO W/SCOPE: CPT

## 2022-04-26 PROCEDURE — 87186 SC STD MICRODIL/AGAR DIL: CPT

## 2022-04-26 PROCEDURE — 87641 MR-STAPH DNA AMP PROBE: CPT

## 2022-04-26 PROCEDURE — 80048 BASIC METABOLIC PNL TOTAL CA: CPT

## 2022-04-26 PROCEDURE — 87086 URINE CULTURE/COLONY COUNT: CPT

## 2022-04-26 PROCEDURE — 71046 X-RAY EXAM CHEST 2 VIEWS: CPT

## 2022-04-26 PROCEDURE — 99214 OFFICE O/P EST MOD 30 MIN: CPT | Performed by: NURSE PRACTITIONER

## 2022-04-26 PROCEDURE — 85027 COMPLETE CBC AUTOMATED: CPT

## 2022-04-26 PROCEDURE — 87077 CULTURE AEROBIC IDENTIFY: CPT

## 2022-04-26 PROCEDURE — 51702 INSERT TEMP BLADDER CATH: CPT | Performed by: NURSE PRACTITIONER

## 2022-04-26 PROCEDURE — 36415 COLL VENOUS BLD VENIPUNCTURE: CPT

## 2022-04-26 RX ORDER — CEPHALEXIN 500 MG/1
500 CAPSULE ORAL 4 TIMES DAILY
Qty: 28 CAPSULE | Refills: 0 | Status: ON HOLD | OUTPATIENT
Start: 2022-04-26 | End: 2022-05-04

## 2022-04-26 NOTE — ASSESSMENT & PLAN NOTE
Worsening renal function;   Scheduled for bilateral ureteral re-implantation and stent exchange. Velazco catheter placed today in clinic. On stat BMP Monday morning, prior to surgery  I discussed her care with Dr. Pam Jarrett. We both understand what not sure if we can alter her course or renal failure. If we have a chance to prolong the time to dialysis, it is reasonable to proceed surgery as planned.

## 2022-04-26 NOTE — TELEPHONE ENCOUNTER
Called pt to see if she can come in tomorrow so that Jasminadale You can put a foly in due to her liver function being high pt was able to come in same day to get ramírez put in so no affection happens before surgery

## 2022-04-26 NOTE — PROGRESS NOTES
Per Dr. Padron Sero- ramírez insertion prior to surgery. Recheck renal function after. Patient is doing pre-admission testing now; wishes to have this done today at hospital.  We are attempting to arrange via Same Day.

## 2022-04-26 NOTE — PROGRESS NOTES
Chief Complaint   Patient presents with    Follow-up    Hydronephrosis     cath insertion        PHQ-9 score is    Negative    Vitals:    04/26/22 1537   BP: 138/81   Pulse: 79   Temp: 96.8 °F (36 °C)   TempSrc: Temporal   SpO2: 100%   Weight: 168 lb (76.2 kg)   Height: 5' 3\" (1.6 m)   PainSc:   0 - No pain      1. \"Have you been to the ER, urgent care clinic since your last visit? Hospitalized since your last visit? \" No    2. \"Have you seen or consulted any other health care providers outside of the [address] [place] [address] since your last visit? \" No     3. For patients aged 39-70: Has the patient had a colonoscopy / FIT/ Cologuard? No      If the patient is female:    4. For patients aged 41-77: Has the patient had a mammogram within the past 2 years? No      5. For patients aged 21-65: Has the patient had a pap smear?  No

## 2022-04-26 NOTE — PROGRESS NOTES
Reviewed with Dr. Alexandria Pettit. Plan for ramírez today or tomorrow. Follow up with Nephrology also.

## 2022-04-27 NOTE — ASSESSMENT & PLAN NOTE
Bilateral ureteral obstruction with indwelling stents. Despite this her renal function is worsening. Indwelling Velazco was placed. She was placed on Keflex for possible urinary infection or colonization.

## 2022-04-27 NOTE — ASSESSMENT & PLAN NOTE
Bilateral ureteral obstruction at the level of the bladder insertion. She is scheduled for a bilateral ureteral reimplantation next week. We discussed the risk and potential benefits. She wishes to proceed.

## 2022-04-27 NOTE — PROGRESS NOTES
HISTORY OF PRESENT ILLNESS  Pernell Oppenheim is a 63 y.o. female. Chief Complaint   Patient presents with    Follow-up    Hydronephrosis     cath insertion      Past Medical History:  PMHx (including negatives):  has a past medical history of Arthritis, Burning with urination, Chronic kidney disease, CKD (chronic kidney disease), GERD (gastroesophageal reflux disease), MS (multiple sclerosis) ([address] Nonneurogenic neurogenic bladder dysfunction, and S/P ureteral stent placement. PSurgHx:  has a past surgical history that includes hx hysterectomy; hx femur fracture tx; hx urological (12/07/2020); hx urological (12/07/2020); hx urological (12/07/2020); hx urological (Bilateral, 12/07/2020); hx urological (05/27/2021); hx urological (Bilateral, 05/27/2021); hx urological (Bilateral, 05/27/2021); hx urological (03/03/2022); hx urological (Bilateral, 03/03/2022); hx urological (Bilateral, 03/03/2022); hx ankle fracture tx (Right); pr femur/knee surg unlisted (Right); and hx other surgical.  PSocHx:  reports that she has never smoked. She has never used smokeless tobacco. She reports previous alcohol use. She reports that she does not use drugs. Patient arrived after PAT testing. PAT or Same Day unable to place ramírez as patient was already \"checked out\" of the visit. BMP came back with a worse creatinine.   Component Ref Range & Units 4/26/22 1330 3/3/22 0915 6/22/21 1148 12/3/20 1506   Sodium 136 - 145 mmol/L 140   142 R  139    Potassium 3.5 - 5.1 mmol/L 5.3 High   5.4 High   6.0 High  R  4.0    Chloride 97 - 108 mmol/L 111 High    108 High  R  104    CO2 21 - 32 mmol/L 25   20 R  30    Anion gap 5 - 15 mmol/L 4 Low     5    Glucose 65 - 100 mg/dL 82   90 R  99    BUN 6 - 20 mg/dL 64 High    37 High  R  22 High     Creatinine 0.55 - 1.02 mg/dL 6.32 High    3.90 High  R  1.86 High     BUN/Creatinine ratio 12 - 20   10 Low    9 R  12        I had the patient coming for Ramírez catheter placement to assess if improved drainage would help her renal function. I discussed her care with Dr. Bev Pederson. We are unsure if her renal function may be improved with ureteral reimplantation as scheduled. 14F ramírez catheter placed with no issues. Clear, yellow urine draining. Leg bag and StatLock applied. Patient tolerated procedure well. Chronic Conditions Addressed Today     1. Hydronephrosis     Overview      US done on 4/7/21 reads bilateral ureteral stents with moderate hydronephrosis despite the placement of stents ([id number]). 5/21/21: persistent hydronephrosis with CKD, MS and bladder diverticulum. 5/27/21 ureteral stent exchange. 6F double J stent bilaterally. 3/3/22: Cystoscopy, retrograde pyelograms, BILATERAL ureteral stent exchange. Current Assessment & Plan       Bilateral ureteral obstruction at the level of the bladder insertion. She is scheduled for a bilateral ureteral reimplantation next week. We discussed the risk and potential benefits. She wishes to proceed. 2. Neurogenic bladder     Overview      She has a neurogenic bladder secondary to multiple sclerosis with previous history of urinary retention along with bilateral hydronephrosis treated with bilateral stent placement on 12/7/2020 by Dr. Taty Becerra with assistance from Dr. Tarun Silveira. She continues to be managed with ureteral stents. Relevant Medications     cephALEXin (KEFLEX) 500 mg capsule    3. Bladder diverticulum - Primary     Overview      US 4/7/21 notes an anechoic fluid collection located to the left of the bladder apparently representing a moderately large bladder diverticulum. 3/3/22: The bladder mucosa was normal in appearance without tumors, lesions or trabeculation seen. The ureteral orifices were seen on either side. Current Assessment & Plan       Neurogenic bladder and bladder diverticulum may contribute to storage and emptying problems.   Ramírez catheter was placed today to aid with emptying          Relevant Medications     cephALEXin (KEFLEX) 500 mg capsule    4. Retained ureteral stent     Overview      12/7/2020 bilateral ureteral stent placement by Dr. Shayy Márquez with assistance from Dr. Joaquim Valentin. 5/27/21: stent exchange. Treated with nitrofurantoin on 5/27/21.    3/3/22: Cystoscopy, retrograde pyelograms, BILATERAL ureteral stent exchange. Current Assessment & Plan      Bilateral ureteral obstruction with indwelling stents. Despite this her renal function is worsening. Indwelling Velazco was placed. She was placed on Keflex for possible urinary infection or colonization. Relevant Medications     cephALEXin (KEFLEX) 500 mg capsule    5. CKD (chronic kidney disease)     Overview      CKD, followed by Dr. Royce Magdaleno, Nephrology. 12/3/2020 Cr was 1.86  4/26/21 Cr was 3.8  4/26/22 Cr was 6.32          Current Assessment & Plan      Worsening renal function;   Scheduled for bilateral ureteral re-implantation and stent exchange. Velazco catheter placed today in clinic. On stat BMP Monday morning, prior to surgery  I discussed her care with Dr. Royce Magdaleno. We both understand what not sure if we can alter her course or renal failure. If we have a chance to prolong the time to dialysis, it is reasonable to proceed surgery as planned. ROS  Patient denies the symptoms of COVID-19 per routine screening guidelines. Physical Exam    ASSESSMENT and PLAN  Diagnoses and all orders for this visit:    1. Bladder diverticulum  Assessment & Plan:   Neurogenic bladder and bladder diverticulum may contribute to storage and emptying problems. Velazco catheter was placed today to aid with emptying    Orders:  -     cephALEXin (KEFLEX) 500 mg capsule; Take 1 Capsule by mouth four (4) times daily for 7 days.     2. Chronic kidney disease, unspecified CKD stage  Assessment & Plan:  Worsening renal function;   Scheduled for bilateral ureteral re-implantation and stent exchange. Velazco catheter placed today in clinic. On stat BMP Monday morning, prior to surgery  I discussed her care with Dr. Fahad Castillo. We both understand what not sure if we can alter her course or renal failure. If we have a chance to prolong the time to dialysis, it is reasonable to proceed surgery as planned. 3. Neurogenic bladder  -     cephALEXin (KEFLEX) 500 mg capsule; Take 1 Capsule by mouth four (4) times daily for 7 days. 4. Retained ureteral stent  Assessment & Plan:  Bilateral ureteral obstruction with indwelling stents. Despite this her renal function is worsening. Indwelling Velazco was placed. She was placed on Keflex for possible urinary infection or colonization. Orders:  -     cephALEXin (KEFLEX) 500 mg capsule; Take 1 Capsule by mouth four (4) times daily for 7 days. 5. Hydronephrosis with ureteral stricture, not elsewhere classified  Assessment & Plan:   Bilateral ureteral obstruction at the level of the bladder insertion. She is scheduled for a bilateral ureteral reimplantation next week. We discussed the risk and potential benefits. She wishes to proceed. STAT repeat BMP on Monday. Ema Brady notified MECHELLE Rainey may have a reminder for a \"due or due soon\" health maintenance. The patient has been encouraged to contact their primary care provider for follow-up on this health maintenance or other necessary and/or routine health screening.      Shoaib Good MD

## 2022-04-27 NOTE — ASSESSMENT & PLAN NOTE
Neurogenic bladder and bladder diverticulum may contribute to storage and emptying problems.   Velazco catheter was placed today to aid with emptying

## 2022-04-28 NOTE — PERIOP NOTES
Spoke to Sherryle Crandall at Dr. Dulce Rasheed office, made aware of preliminary urine culture result, Sherryle Crandall stated would make David Cadet NP aware.

## 2022-04-29 DIAGNOSIS — N32.3 BLADDER DIVERTICULUM: Primary | ICD-10-CM

## 2022-04-29 DIAGNOSIS — N30.00 ACUTE CYSTITIS WITHOUT HEMATURIA: ICD-10-CM

## 2022-04-29 LAB
BACTERIA SPEC CULT: ABNORMAL
COLONY COUNT,CNT: ABNORMAL
COLONY COUNT,CNT: ABNORMAL
SPECIAL REQUESTS,SREQ: ABNORMAL

## 2022-05-02 ENCOUNTER — ANESTHESIA (OUTPATIENT)
Dept: SURGERY | Age: 62
DRG: 660 | End: 2022-05-02

## 2022-05-02 ENCOUNTER — ANESTHESIA EVENT (OUTPATIENT)
Dept: SURGERY | Age: 62
DRG: 660 | End: 2022-05-02

## 2022-05-02 ENCOUNTER — HOSPITAL ENCOUNTER (INPATIENT)
Age: 62
LOS: 1 days | Discharge: HOME | DRG: 660 | End: 2022-05-04
Attending: UROLOGY | Admitting: UROLOGY

## 2022-05-02 ENCOUNTER — APPOINTMENT (OUTPATIENT)
Dept: GENERAL RADIOLOGY | Age: 62
DRG: 660 | End: 2022-05-02
Attending: UROLOGY

## 2022-05-02 DIAGNOSIS — N18.5 STAGE 5 CHRONIC KIDNEY DISEASE NOT ON CHRONIC DIALYSIS (HCC): ICD-10-CM

## 2022-05-02 DIAGNOSIS — N13.1 ACQUIRED HYDRONEPHROSIS DUE TO OBSTRUCTION OF URETER: ICD-10-CM

## 2022-05-02 DIAGNOSIS — N31.9 NEUROGENIC BLADDER: ICD-10-CM

## 2022-05-02 DIAGNOSIS — G35 MULTIPLE SCLEROSIS (HCC): ICD-10-CM

## 2022-05-02 DIAGNOSIS — N32.3 BLADDER DIVERTICULUM: ICD-10-CM

## 2022-05-02 DIAGNOSIS — N13.1 HYDRONEPHROSIS WITH URETERAL STRICTURE, NOT ELSEWHERE CLASSIFIED: Primary | ICD-10-CM

## 2022-05-02 DIAGNOSIS — Z96.0 RETAINED URETERAL STENT: ICD-10-CM

## 2022-05-02 LAB
ANION GAP SERPL CALC-SCNC: 9 MMOL/L (ref 5–15)
BUN SERPL-MCNC: 73 MG/DL (ref 6–20)
BUN/CREAT SERPL: 14 (ref 12–20)
CA-I BLD-MCNC: 9.3 MG/DL (ref 8.5–10.1)
CHLORIDE SERPL-SCNC: 108 MMOL/L (ref 97–108)
CO2 SERPL-SCNC: 21 MMOL/L (ref 21–32)
CREAT SERPL-MCNC: 5.11 MG/DL (ref 0.55–1.02)
GLUCOSE BLD STRIP.AUTO-MCNC: 84 MG/DL (ref 65–117)
GLUCOSE SERPL-MCNC: 87 MG/DL (ref 65–100)
PERFORMED BY, TECHID: NORMAL
POTASSIUM SERPL-SCNC: 4.4 MMOL/L (ref 3.5–5.1)
POTASSIUM SERPL-SCNC: 4.6 MMOL/L (ref 3.5–5.1)
SODIUM SERPL-SCNC: 138 MMOL/L (ref 136–145)

## 2022-05-02 PROCEDURE — 76000 FLUOROSCOPY <1 HR PHYS/QHP: CPT

## 2022-05-02 PROCEDURE — C1726 CATH, BAL DIL, NON-VASCULAR: HCPCS | Performed by: UROLOGY

## 2022-05-02 PROCEDURE — 74011250636 HC RX REV CODE- 250/636: Performed by: NURSE PRACTITIONER

## 2022-05-02 PROCEDURE — 2709999900 HC NON-CHARGEABLE SUPPLY: Performed by: UROLOGY

## 2022-05-02 PROCEDURE — 74011250637 HC RX REV CODE- 250/637: Performed by: NURSE PRACTITIONER

## 2022-05-02 PROCEDURE — 77030016151 HC PROTCTR LNS DFOG COVD -B: Performed by: UROLOGY

## 2022-05-02 PROCEDURE — 82962 GLUCOSE BLOOD TEST: CPT

## 2022-05-02 PROCEDURE — 77030008756 HC TU IRR SUC STRY -B: Performed by: UROLOGY

## 2022-05-02 PROCEDURE — 8E0W3CZ ROBOTIC ASSISTED PROCEDURE OF TRUNK REGION, PERCUTANEOUS APPROACH: ICD-10-PCS | Performed by: UROLOGY

## 2022-05-02 PROCEDURE — 74011000636 HC RX REV CODE- 636: Performed by: UROLOGY

## 2022-05-02 PROCEDURE — 84132 ASSAY OF SERUM POTASSIUM: CPT

## 2022-05-02 PROCEDURE — BT141ZZ FLUOROSCOPY OF KIDNEYS, URETERS AND BLADDER USING LOW OSMOLAR CONTRAST: ICD-10-PCS | Performed by: UROLOGY

## 2022-05-02 PROCEDURE — 77030008606 HC TRCR ENDOSC KII AMR -B: Performed by: UROLOGY

## 2022-05-02 PROCEDURE — 77030002888 HC SUT CHRMC J&J -A: Performed by: UROLOGY

## 2022-05-02 PROCEDURE — 76060000038 HC ANESTHESIA 3.5 TO 4 HR: Performed by: UROLOGY

## 2022-05-02 PROCEDURE — 74011000250 HC RX REV CODE- 250: Performed by: NURSE PRACTITIONER

## 2022-05-02 PROCEDURE — 76210000006 HC OR PH I REC 0.5 TO 1 HR: Performed by: UROLOGY

## 2022-05-02 PROCEDURE — 77030035277 HC OBTRTR BLDELSS DISP INTU -B: Performed by: UROLOGY

## 2022-05-02 PROCEDURE — 0T788DZ DILATION OF BILATERAL URETERS WITH INTRALUMINAL DEVICE, VIA NATURAL OR ARTIFICIAL OPENING ENDOSCOPIC: ICD-10-PCS | Performed by: UROLOGY

## 2022-05-02 PROCEDURE — 76010000879 HC OR TIME 3.5 TO 4HR INTENSV - TIER 2: Performed by: UROLOGY

## 2022-05-02 PROCEDURE — 36415 COLL VENOUS BLD VENIPUNCTURE: CPT

## 2022-05-02 PROCEDURE — C1769 GUIDE WIRE: HCPCS | Performed by: UROLOGY

## 2022-05-02 PROCEDURE — 77030040361 HC SLV COMPR DVT MDII -B: Performed by: UROLOGY

## 2022-05-02 PROCEDURE — 77030002935 HC SUT MCRYL J&J -C: Performed by: UROLOGY

## 2022-05-02 PROCEDURE — 77030002916 HC SUT ETHLN J&J -A: Performed by: UROLOGY

## 2022-05-02 PROCEDURE — 77030010935 HC CLP LIG ABSRB TELE -B: Performed by: UROLOGY

## 2022-05-02 PROCEDURE — 77030031139 HC SUT VCRL2 J&J -A: Performed by: UROLOGY

## 2022-05-02 PROCEDURE — 52332 CYSTOSCOPY AND TREATMENT: CPT | Performed by: UROLOGY

## 2022-05-02 PROCEDURE — 77030002933 HC SUT MCRYL J&J -A: Performed by: UROLOGY

## 2022-05-02 PROCEDURE — 77030014023 HC SYR INFL LVEEN BSC -B: Performed by: UROLOGY

## 2022-05-02 PROCEDURE — 50947 LAPARO NEW URETER/BLADDER: CPT | Performed by: UROLOGY

## 2022-05-02 PROCEDURE — 77030003578 HC NDL INSUF VERES AMR -B: Performed by: UROLOGY

## 2022-05-02 PROCEDURE — 74011250636 HC RX REV CODE- 250/636: Performed by: UROLOGY

## 2022-05-02 PROCEDURE — 77030018813 HC SCIS LAPSCP EPIX DISP AMR -B: Performed by: UROLOGY

## 2022-05-02 PROCEDURE — 77030005513 HC CATH URETH FOL11 MDII -B: Performed by: UROLOGY

## 2022-05-02 PROCEDURE — 77030020703 HC SEAL CANN DISP INTU -B: Performed by: UROLOGY

## 2022-05-02 PROCEDURE — 74420 UROGRAPHY RTRGR +-KUB: CPT | Performed by: UROLOGY

## 2022-05-02 PROCEDURE — C2617 STENT, NON-COR, TEM W/O DEL: HCPCS | Performed by: UROLOGY

## 2022-05-02 PROCEDURE — 77030010507 HC ADH SKN DERMBND J&J -B: Performed by: UROLOGY

## 2022-05-02 RX ORDER — BISACODYL 5 MG
5 TABLET, DELAYED RELEASE (ENTERIC COATED) ORAL DAILY PRN
Status: DISCONTINUED | OUTPATIENT
Start: 2022-05-02 | End: 2022-05-04 | Stop reason: HOSPADM

## 2022-05-02 RX ORDER — ONDANSETRON 2 MG/ML
INJECTION INTRAMUSCULAR; INTRAVENOUS AS NEEDED
Status: DISCONTINUED | OUTPATIENT
Start: 2022-05-02 | End: 2022-05-02 | Stop reason: HOSPADM

## 2022-05-02 RX ORDER — FENTANYL CITRATE 50 UG/ML
50 INJECTION, SOLUTION INTRAMUSCULAR; INTRAVENOUS AS NEEDED
Status: DISCONTINUED | OUTPATIENT
Start: 2022-05-02 | End: 2022-05-02 | Stop reason: HOSPADM

## 2022-05-02 RX ORDER — DEXAMETHASONE SODIUM PHOSPHATE 4 MG/ML
INJECTION, SOLUTION INTRA-ARTICULAR; INTRALESIONAL; INTRAMUSCULAR; INTRAVENOUS; SOFT TISSUE AS NEEDED
Status: DISCONTINUED | OUTPATIENT
Start: 2022-05-02 | End: 2022-05-02 | Stop reason: HOSPADM

## 2022-05-02 RX ORDER — MORPHINE SULFATE 2 MG/ML
2 INJECTION, SOLUTION INTRAMUSCULAR; INTRAVENOUS
Status: DISCONTINUED | OUTPATIENT
Start: 2022-05-02 | End: 2022-05-02 | Stop reason: HOSPADM

## 2022-05-02 RX ORDER — LIDOCAINE HYDROCHLORIDE 20 MG/ML
INJECTION, SOLUTION EPIDURAL; INFILTRATION; INTRACAUDAL; PERINEURAL AS NEEDED
Status: DISCONTINUED | OUTPATIENT
Start: 2022-05-02 | End: 2022-05-02 | Stop reason: HOSPADM

## 2022-05-02 RX ORDER — HYDRALAZINE HYDROCHLORIDE 20 MG/ML
10 INJECTION INTRAMUSCULAR; INTRAVENOUS
Status: DISCONTINUED | OUTPATIENT
Start: 2022-05-02 | End: 2022-05-02 | Stop reason: HOSPADM

## 2022-05-02 RX ORDER — DIPHENHYDRAMINE HYDROCHLORIDE 50 MG/ML
12.5 INJECTION, SOLUTION INTRAMUSCULAR; INTRAVENOUS AS NEEDED
Status: DISCONTINUED | OUTPATIENT
Start: 2022-05-02 | End: 2022-05-02 | Stop reason: HOSPADM

## 2022-05-02 RX ORDER — SIMETHICONE 80 MG
80 TABLET,CHEWABLE ORAL 4 TIMES DAILY
Status: DISCONTINUED | OUTPATIENT
Start: 2022-05-02 | End: 2022-05-04 | Stop reason: HOSPADM

## 2022-05-02 RX ORDER — SODIUM CHLORIDE 0.9 % (FLUSH) 0.9 %
5-40 SYRINGE (ML) INJECTION EVERY 8 HOURS
Status: DISCONTINUED | OUTPATIENT
Start: 2022-05-02 | End: 2022-05-02 | Stop reason: HOSPADM

## 2022-05-02 RX ORDER — FENTANYL CITRATE 50 UG/ML
INJECTION, SOLUTION INTRAMUSCULAR; INTRAVENOUS AS NEEDED
Status: DISCONTINUED | OUTPATIENT
Start: 2022-05-02 | End: 2022-05-02 | Stop reason: HOSPADM

## 2022-05-02 RX ORDER — ONDANSETRON 2 MG/ML
4 INJECTION INTRAMUSCULAR; INTRAVENOUS
Status: DISCONTINUED | OUTPATIENT
Start: 2022-05-02 | End: 2022-05-04 | Stop reason: HOSPADM

## 2022-05-02 RX ORDER — LIDOCAINE HYDROCHLORIDE 10 MG/ML
0.1 INJECTION, SOLUTION EPIDURAL; INFILTRATION; INTRACAUDAL; PERINEURAL AS NEEDED
Status: DISCONTINUED | OUTPATIENT
Start: 2022-05-02 | End: 2022-05-02 | Stop reason: HOSPADM

## 2022-05-02 RX ORDER — MIDAZOLAM HYDROCHLORIDE 1 MG/ML
1 INJECTION, SOLUTION INTRAMUSCULAR; INTRAVENOUS AS NEEDED
Status: DISCONTINUED | OUTPATIENT
Start: 2022-05-02 | End: 2022-05-02 | Stop reason: HOSPADM

## 2022-05-02 RX ORDER — SODIUM CHLORIDE 0.9 % (FLUSH) 0.9 %
5-40 SYRINGE (ML) INJECTION AS NEEDED
Status: DISCONTINUED | OUTPATIENT
Start: 2022-05-02 | End: 2022-05-02 | Stop reason: HOSPADM

## 2022-05-02 RX ORDER — ONDANSETRON 2 MG/ML
4 INJECTION INTRAMUSCULAR; INTRAVENOUS AS NEEDED
Status: DISCONTINUED | OUTPATIENT
Start: 2022-05-02 | End: 2022-05-02 | Stop reason: HOSPADM

## 2022-05-02 RX ORDER — CEFAZOLIN SODIUM 1 G/3ML
INJECTION, POWDER, FOR SOLUTION INTRAMUSCULAR; INTRAVENOUS AS NEEDED
Status: DISCONTINUED | OUTPATIENT
Start: 2022-05-02 | End: 2022-05-02 | Stop reason: HOSPADM

## 2022-05-02 RX ORDER — SODIUM CHLORIDE 0.9 % (FLUSH) 0.9 %
5-40 SYRINGE (ML) INJECTION AS NEEDED
Status: DISCONTINUED | OUTPATIENT
Start: 2022-05-02 | End: 2022-05-04 | Stop reason: HOSPADM

## 2022-05-02 RX ORDER — FENTANYL CITRATE 50 UG/ML
50 INJECTION, SOLUTION INTRAMUSCULAR; INTRAVENOUS
Status: DISCONTINUED | OUTPATIENT
Start: 2022-05-02 | End: 2022-05-02 | Stop reason: HOSPADM

## 2022-05-02 RX ORDER — ROCURONIUM BROMIDE 10 MG/ML
INJECTION, SOLUTION INTRAVENOUS AS NEEDED
Status: DISCONTINUED | OUTPATIENT
Start: 2022-05-02 | End: 2022-05-02 | Stop reason: HOSPADM

## 2022-05-02 RX ORDER — LABETALOL HCL 20 MG/4 ML
5 SYRINGE (ML) INTRAVENOUS
Status: DISCONTINUED | OUTPATIENT
Start: 2022-05-02 | End: 2022-05-02 | Stop reason: HOSPADM

## 2022-05-02 RX ORDER — SODIUM CHLORIDE 0.9 % (FLUSH) 0.9 %
5-40 SYRINGE (ML) INJECTION EVERY 8 HOURS
Status: DISCONTINUED | OUTPATIENT
Start: 2022-05-02 | End: 2022-05-04 | Stop reason: HOSPADM

## 2022-05-02 RX ORDER — TAMSULOSIN HYDROCHLORIDE 0.4 MG/1
0.4 CAPSULE ORAL DAILY
Status: DISCONTINUED | OUTPATIENT
Start: 2022-05-03 | End: 2022-05-04 | Stop reason: HOSPADM

## 2022-05-02 RX ORDER — AMITRIPTYLINE HYDROCHLORIDE 25 MG/1
25 TABLET, FILM COATED ORAL
Status: DISCONTINUED | OUTPATIENT
Start: 2022-05-02 | End: 2022-05-04 | Stop reason: HOSPADM

## 2022-05-02 RX ORDER — LIDOCAINE 4 G/100G
1 PATCH TOPICAL EVERY 12 HOURS
Status: DISCONTINUED | OUTPATIENT
Start: 2022-05-02 | End: 2022-05-04 | Stop reason: HOSPADM

## 2022-05-02 RX ORDER — SODIUM CHLORIDE 9 MG/ML
20 INJECTION, SOLUTION INTRAVENOUS CONTINUOUS
Status: DISCONTINUED | OUTPATIENT
Start: 2022-05-02 | End: 2022-05-03

## 2022-05-02 RX ORDER — GABAPENTIN 300 MG/1
300 CAPSULE ORAL 2 TIMES DAILY
Status: DISCONTINUED | OUTPATIENT
Start: 2022-05-02 | End: 2022-05-04 | Stop reason: HOSPADM

## 2022-05-02 RX ORDER — FACIAL-BODY WIPES
10 EACH TOPICAL DAILY
Status: DISCONTINUED | OUTPATIENT
Start: 2022-05-03 | End: 2022-05-04 | Stop reason: HOSPADM

## 2022-05-02 RX ORDER — HYDROMORPHONE HYDROCHLORIDE 1 MG/ML
0.4 INJECTION, SOLUTION INTRAMUSCULAR; INTRAVENOUS; SUBCUTANEOUS
Status: DISCONTINUED | OUTPATIENT
Start: 2022-05-02 | End: 2022-05-02 | Stop reason: HOSPADM

## 2022-05-02 RX ORDER — PROPOFOL 10 MG/ML
INJECTION, EMULSION INTRAVENOUS AS NEEDED
Status: DISCONTINUED | OUTPATIENT
Start: 2022-05-02 | End: 2022-05-02 | Stop reason: HOSPADM

## 2022-05-02 RX ORDER — TIZANIDINE 2 MG/1
2 TABLET ORAL 3 TIMES DAILY
Status: DISCONTINUED | OUTPATIENT
Start: 2022-05-02 | End: 2022-05-04 | Stop reason: HOSPADM

## 2022-05-02 RX ORDER — POTASSIUM CHLORIDE AND SODIUM CHLORIDE 450; 150 MG/100ML; MG/100ML
100 INJECTION, SOLUTION INTRAVENOUS CONTINUOUS
Status: DISCONTINUED | OUTPATIENT
Start: 2022-05-02 | End: 2022-05-03

## 2022-05-02 RX ORDER — ACETAMINOPHEN 325 MG/1
650 TABLET ORAL
Status: DISCONTINUED | OUTPATIENT
Start: 2022-05-02 | End: 2022-05-04 | Stop reason: HOSPADM

## 2022-05-02 RX ORDER — SODIUM CHLORIDE 9 MG/ML
INJECTION, SOLUTION INTRAVENOUS
Status: DISCONTINUED | OUTPATIENT
Start: 2022-05-02 | End: 2022-05-02 | Stop reason: HOSPADM

## 2022-05-02 RX ORDER — METOPROLOL TARTRATE 5 MG/5ML
2.5 INJECTION INTRAVENOUS
Status: DISCONTINUED | OUTPATIENT
Start: 2022-05-02 | End: 2022-05-02 | Stop reason: HOSPADM

## 2022-05-02 RX ORDER — DOCUSATE SODIUM 100 MG/1
100 CAPSULE, LIQUID FILLED ORAL 2 TIMES DAILY
Status: DISCONTINUED | OUTPATIENT
Start: 2022-05-02 | End: 2022-05-04 | Stop reason: HOSPADM

## 2022-05-02 RX ORDER — MIDAZOLAM HYDROCHLORIDE 1 MG/ML
0.5 INJECTION, SOLUTION INTRAMUSCULAR; INTRAVENOUS
Status: DISCONTINUED | OUTPATIENT
Start: 2022-05-02 | End: 2022-05-02 | Stop reason: HOSPADM

## 2022-05-02 RX ORDER — NORETHINDRONE AND ETHINYL ESTRADIOL 0.5-0.035
5 KIT ORAL AS NEEDED
Status: DISCONTINUED | OUTPATIENT
Start: 2022-05-02 | End: 2022-05-02 | Stop reason: HOSPADM

## 2022-05-02 RX ORDER — OXYCODONE AND ACETAMINOPHEN 5; 325 MG/1; MG/1
1 TABLET ORAL AS NEEDED
Status: DISCONTINUED | OUTPATIENT
Start: 2022-05-02 | End: 2022-05-02 | Stop reason: HOSPADM

## 2022-05-02 RX ORDER — HYDROCODONE BITARTRATE AND ACETAMINOPHEN 5; 325 MG/1; MG/1
1 TABLET ORAL
Status: DISCONTINUED | OUTPATIENT
Start: 2022-05-02 | End: 2022-05-04 | Stop reason: HOSPADM

## 2022-05-02 RX ADMIN — TIZANIDINE 2 MG: 2 TABLET ORAL at 15:22

## 2022-05-02 RX ADMIN — SODIUM CHLORIDE 1000 ML: 9 INJECTION, SOLUTION INTRAVENOUS at 08:17

## 2022-05-02 RX ADMIN — SIMETHICONE 80 MG: 80 TABLET, CHEWABLE ORAL at 21:29

## 2022-05-02 RX ADMIN — SIMETHICONE 80 MG: 80 TABLET, CHEWABLE ORAL at 15:21

## 2022-05-02 RX ADMIN — AMITRIPTYLINE HYDROCHLORIDE 25 MG: 25 TABLET, FILM COATED ORAL at 21:29

## 2022-05-02 RX ADMIN — DEXAMETHASONE SODIUM PHOSPHATE 4 MG: 4 INJECTION, SOLUTION INTRA-ARTICULAR; INTRALESIONAL; INTRAMUSCULAR; INTRAVENOUS; SOFT TISSUE at 10:04

## 2022-05-02 RX ADMIN — SIMETHICONE 80 MG: 80 TABLET, CHEWABLE ORAL at 18:05

## 2022-05-02 RX ADMIN — WATER 2 G: 1 INJECTION INTRAMUSCULAR; INTRAVENOUS; SUBCUTANEOUS at 21:30

## 2022-05-02 RX ADMIN — SUGAMMADEX 200 MG: 100 INJECTION, SOLUTION INTRAVENOUS at 13:00

## 2022-05-02 RX ADMIN — FENTANYL CITRATE 50 MCG: 50 INJECTION, SOLUTION INTRAMUSCULAR; INTRAVENOUS at 09:37

## 2022-05-02 RX ADMIN — FENTANYL CITRATE 50 MCG: 50 INJECTION, SOLUTION INTRAMUSCULAR; INTRAVENOUS at 10:51

## 2022-05-02 RX ADMIN — ROCURONIUM BROMIDE 50 MG: 50 INJECTION, SOLUTION INTRAVENOUS at 09:38

## 2022-05-02 RX ADMIN — PROPOFOL 100 MG: 10 INJECTION, EMULSION INTRAVENOUS at 09:37

## 2022-05-02 RX ADMIN — CEFAZOLIN SODIUM 2 G: 1 INJECTION, POWDER, FOR SOLUTION INTRAMUSCULAR; INTRAVENOUS at 10:01

## 2022-05-02 RX ADMIN — POTASSIUM CHLORIDE AND SODIUM CHLORIDE 100 ML/HR: 450; 150 INJECTION, SOLUTION INTRAVENOUS at 14:58

## 2022-05-02 RX ADMIN — ONDANSETRON 4 MG: 2 INJECTION INTRAMUSCULAR; INTRAVENOUS at 10:04

## 2022-05-02 RX ADMIN — GABAPENTIN 300 MG: 300 CAPSULE ORAL at 20:13

## 2022-05-02 RX ADMIN — ROCURONIUM BROMIDE 30 MG: 50 INJECTION, SOLUTION INTRAVENOUS at 10:57

## 2022-05-02 RX ADMIN — BENZOCAINE AND MENTHOL 1 LOZENGE: 15; 3.6 LOZENGE ORAL at 19:06

## 2022-05-02 RX ADMIN — TIZANIDINE 2 MG: 2 TABLET ORAL at 21:29

## 2022-05-02 RX ADMIN — DOCUSATE SODIUM 100 MG: 100 CAPSULE, LIQUID FILLED ORAL at 20:13

## 2022-05-02 RX ADMIN — SODIUM CHLORIDE: 9 INJECTION, SOLUTION INTRAVENOUS at 09:31

## 2022-05-02 RX ADMIN — ACETAMINOPHEN 650 MG: 650 SOLUTION ORAL at 15:20

## 2022-05-02 RX ADMIN — SODIUM CHLORIDE: 9 INJECTION, SOLUTION INTRAVENOUS at 10:01

## 2022-05-02 RX ADMIN — LIDOCAINE HYDROCHLORIDE 60 MG: 20 INJECTION, SOLUTION EPIDURAL; INFILTRATION; INTRACAUDAL; PERINEURAL at 09:37

## 2022-05-02 RX ADMIN — BENZOCAINE AND MENTHOL 1 LOZENGE: 15; 3.6 LOZENGE ORAL at 22:03

## 2022-05-02 RX ADMIN — ROCURONIUM BROMIDE 20 MG: 50 INJECTION, SOLUTION INTRAVENOUS at 10:50

## 2022-05-02 NOTE — OP NOTES
UROLOGY OPERATIVE NOTE    Patient: Pato Benton MRN: [id number]  SSN: [id number]    YOB: 1960  Age: 63 y.o. Sex: female          Pre-operative Diagnosis: Hydronephrosis with ureteral stricture [N13.1]  Post-operative Diagnosis: Hydronephrosis with ureteral stricture [N13.1]  Procedure: Cystoscopy, bilateral retrograde pyelogram , bilateral robotic ureteral reimplantation  BILATERAL  ureteral stent placement  ureteral dilation   (Complex due to prior pelvic surgery and scarring)    Surgeon: Amrita Worley MD  Assistant: none    Anesthesia:  General  Findings: Extensive pelvic adhesions. Interpretation of left retrograde pyelogram:  Normal appearing left ureter and renal pelvis. No strictures, dilation, radiopacities or filling defects seen. The calyces were sharp and pristine. Interpretation of right retrograde pyelogram:  Normal appearing right ureter and renal pelvis. No strictures, dilation, radiopacities or filling defects seen. The calyces were sharp and pristine. Estimated Blood Loss:  scant  Drains: 16F ramírez, 8F x 24cm JJ ureteral stents bilaterally  Specimens: none  Complications: none           Procedure Details: The patient was seen in the pre-operative area. The risks, benefits, complications, alternative treatment options, and expected outcomes were again discussed with the patient. The possibilities of reaction to medication, pain, infection, bleeding, major cardiovascular event, death, damage to surrounding structures were specifically addressed. Informed consent was obtained. Upon arrival to the operative suite, the patient, procedure, and side were confirmed via a pre-operative \"time-out\". All were in agreement. The patient was carefully positioned and anesthesia was undertaken. Sterile prep and drape was accomplished. The patient was in the lithotomy position.   Using a 21 Pashto cystoscope with 30 and 70 degree lenses complete cystoscopy was performed. The urethra was unremarkable. The bladder mucosa was edematous in appearance like from prior ramírez catheterization. without tumors, lesions or trabeculation seen. The ureteral orifices were seen on either side with ureteral stents. A grasper was used to remove each stent.  fluoroscopic imaging did not reveal obvious stones overlying the urinary tract. Using an open-ended catheter the left ureteral orifice was cannulated. Using Isovue a gentle retrograde pyelogram was performed. The ureter appeared dilated to the UVJ. It was patent to the proximal ureter with kinking nad relative narrowing. The calyces were distendable and blunted. A 0.035 guidewire was inserted. Over the wire a 15F x 4cm balloon dilator was used dilate the proximal restriction. There was no waist on the balloon and the balloon expanded easily. The catheter was removed. Over the wire a 8F x 24 cm JJ ureteral stent was inserted with the proximal curl in the renal pelvis and the distal curl in the bladder. Using an open-ended catheter the right ureteral orifice was cannulated. Using Isovue a gentle retrograde pyelogram was performed. The ureter appeared dilated to the UVJ. It was patent to the proximal ureter with kinking nad relative narrowing. The calyces were distendable and blunted. A 0.035 guidewire was inserted. Over the wire a 15F x 4cm balloon dilator was used dilate the proximal restriction. There was no waist on the balloon and the balloon expanded easily. The catheter was removed. Over the wire a 8F x 24 cm JJ ureteral stent was inserted with the proximal curl in the renal pelvis and the distal curl in the bladder. The bladder was drained with a 16F ramírez. The patient was then positioned in the low lithotomy position in [facility].       A incision was made in the subumbilical incision and a Veress needle was inserted however there was a lot of resistance to the fascia. I made a paramedian incision on left side and inserted the Veress needle there. CO2 insufflation was started to 15 mm mercury pressure. A 8 mm da Sepideh trocar was reduced and laparoscopy was performed. There was some right lateral abdominal adhesions. 8 mm da Sepideh trochars placed in the midline subumbilical position and right paramedian line. Sharp dissection was used to dissect the adhesions off on the right abdomen. A right 8 mm trochar was placed. A left lateral 8 mm trocar was placed. In the left upper quadrant a 5 mm trocar was placed. The [employer] X I surgical robot was docked to the patient. There were some left lower quadrant pelvic adhesions which were taken down. The additional lysis of adhesions added approximately 40 minutes to the procedure. The left ureter was then identified in the retroperitoneum and dissected around level of the iliac vessels to the bladder hiatus. The ureter was partially incised and the stent was withdrawn into the peritoneum. The bladder side was clipped and the remaining ureter was incised. Similarly the right ureter was identified in the retroperitoneum and dissected from the level of the iliac vessels to the bladder hiatus. The ureter was partly incised and the stent was withdrawn into the peritoneum. The bladder side was clipped and the remaining ureter was incised. Both ureters were spatulated anteriorly. The bladder was distended with approximately 100 cc of sterile saline. The posterior lateral wall was selected in an avascular spot on either side. The detrusor muscle was incised down to the mucosa in a triangular fashion. The mucosa was then incised and the bladder was drained. The left ureter was anastomosed to the bladder using 3-0 Monocryl and 3-0 PDS suture. This appeared watertight. There was no significant tension. Stimulate the right detrusor muscle was incised and close in size.   The right ureter was anastomosed with 3-0 Monocryl and 3-0 PDS suture. There was no significant tension. Both distal stents were placed back within the bladder prior to completing the anastomosis. Both anastomosis appeared secure and watertight. Hemostasis was satisfactory. The Velazco cath was placed to bag drainage. The robot was undocked and patient    The abdomen was desufflated and the trochars were removed. The skin was closed with 4-0 Monocryl subcuticular sutures and Dermabond skin glue. The patient was transported in stable condition to recovery.      Isidro Duke MD

## 2022-05-02 NOTE — DISCHARGE INSTRUCTIONS
LEAVING [address] [place] [address] DR. BALDERRAMA'S PATIENTS    Activity   Refrain from vigorous activity (running, golfing, exercising, horseback riding, bicycling) for 4-6 weeks after your surgery. Walking is fine. You may gradually increase your pace and distance as you feel able.  Do not lift anything over 10 lbs for at least 2 weeks.  Avoid sitting still in one position for prolonged periods of time (more than 45 minutes).  Do not drive while you are taking narcotic pain medications. They may make you drowsy.  Driving, in general, should be avoided for 1-2 weeks if possible.  Most patients can return to work in 2-4 weeks. Bathing   Do not soak in tubs, swim, or submerge yourself in water.  You may shower as soon as you get home from the hospital. Keep the incision sites clean and dry, but do not scrub the glue. It will peel off with time. Use mild soap and water to clean your sites and pat yourself dry. Work   When you may return to work is variable. It will depend on your occupation and how quickly you recover. Specific questions can be addressed at your follow up appointment. Medication   Most patients experience only minimal discomfort. Dr. Pranav Bolton prefers you treat this with Tylenol (avoid ibuprofen or aspirin or other NSAID's as they may cause additional bleeding).  You will be sent home with a stronger, prescription pain reliever. However, it is important to note that these medications tend to be EXTREMELY constipating. It is better to avoid them, and only take them if you are having significant pain.  You may also have several days of an antibiotic.  You can resume most of your home medications as soon as you leave the hospital except for anti-coagulants like Coumadin, aspirin, or Eloquis. Some diabetic medications are also not ok to resume (Metformin).   If you have questions about your regular medications, please call the office.  At the time of discharge, you will also have a prescription for the stool softener and the suppository you received during your hospital stay. You may take these daily until you have a bowel movement. You may continue taking the stool softener as needed to combat constipation. Food   We recommend you stick to a bland, soft diet immediately after you are discharged from the hospital.     Do not force yourself to eat. It can contribute to the abdominal bloating you may feel. Once you have had a bowel movement, you can start eating normally, as you feel comfortable.  Avoid gas producing foods (beans, flour, broccoli) that can make you more uncomfortable.  Spread out eating throughout the day with snacks and small meals. Avoid eating large meals at first.    Thierry [place] will have a stent in place. This is a small, flexible plastic internal tube placed into the ureter to promote drainage of your kidney down to the bladder.  The amount of time that the stent remains in place is variable.  Usually, you will have your stent removed in the office at one of your follow up visits.  Anytime you have a stent in place, it is important that you urinate often. It is not uncommon to feel like you need to go more frequently or have a fullness in your abdomen or flank.  Do not hold your urine. Do not allow your bladder to get full and uncomfortable. Empty your bladder often and stay well hydrated. THINGS YOU MAY ENCOUNTER AFTER SURGERY   Abdominal distension, constipation, or bloating. Make sure you are taking your stool softener as directed and drinking plenty of water. Avoid carbonated beverages and gas-producing foods. You can use a suppository daily. The prescription pain medicine can contribute to this. Therefore, only take it when you are having significant pain. Walking and moving around help to move the gas out of your belly.    Blood in the urine.  You may have blood in your urine off and on for several weeks after a urologic procedure or with a stent in place. The blood can make your urine look tea-colored or pink. This is normal.     Nausea/Fatigue. You may experience some transient nausea in the first few days following surgery with anesthesia. You may also feel fatigued after surgery which will subside with time (4-8 weeks).     WHEN TO CALL THE DOCTOR:   You have a fever over 100.5F   You have nausea or vomiting for more than 24 hours   It becomes hard to breathe   You cannot urinate   You develop swelling, redness, or temperature changes in one or both of your legs    [facility] [address], [address]

## 2022-05-02 NOTE — ANESTHESIA POSTPROCEDURE EVALUATION
Procedure(s):  ROBOTIC ASSISTED LAPAROSCOPIC BILATERAL URETERAL REIMPLANTATION  CYSTOURETHROSCOPY, BILATERAL RETROGRADE PYELOGRAM, BILATERAL URETERAL DILATION, BILATERAL STENT EXCHANGE.     general    Anesthesia Post Evaluation      Multimodal analgesia: multimodal analgesia used between 6 hours prior to anesthesia start to PACU discharge  Patient location during evaluation: PACU  Patient participation: complete - patient participated  Level of consciousness: awake  Pain score: 0  Pain management: adequate  Airway patency: patent  Anesthetic complications: no  Cardiovascular status: acceptable  Respiratory status: acceptable  Hydration status: acceptable  Post anesthesia nausea and vomiting:  controlled  Final Post Anesthesia Temperature Assessment:  Normothermia (36.0-37.5 degrees C)      INITIAL Post-op Vital signs:   Vitals Value Taken Time   /77 05/02/22 1345   Temp 36.3 °C (97.3 °F) 05/02/22 1318   Pulse 78 05/02/22 1345   Resp 12 05/02/22 1345   SpO2 100 % 05/02/22 1345

## 2022-05-02 NOTE — ANESTHESIA PREPROCEDURE EVALUATION
Relevant Problems   RENAL FAILURE   (+) Acquired hydronephrosis due to obstruction of ureter   (+) CKD (chronic kidney disease)   (+) Hydronephrosis      HEMATOLOGY   (+) Anemia       Anesthetic History   No history of anesthetic complications            Review of Systems / Medical History  Patient summary reviewed, nursing notes reviewed and pertinent labs reviewed    Pulmonary  Within defined limits                 Neuro/Psych         Neuromuscular disease    Comments: Multiple Sclerosis Cardiovascular  Within defined limits                     GI/Hepatic/Renal     GERD    Renal disease (Patient states that she was told that she will be starting \"imminently\" following her planned CYSTO today, 3/3/2022.): ESRD      Comments: Bladder dysfunction Endo/Other        Obesity and arthritis     Other Findings            Past Medical History:   Diagnosis Date    Arthritis     Burning with urination     Chronic kidney disease     no dialysis    CKD (chronic kidney disease)     GERD (gastroesophageal reflux disease)     MS (multiple sclerosis) ([facility] [address]     Nonneurogenic neurogenic bladder dysfunction     S/P ureteral stent placement        Past Surgical History:   Procedure Laterality Date    HX ANKLE FRACTURE TX Right     HX FEMUR FRACTURE TX      HX HYSTERECTOMY      HX OTHER SURGICAL      MS    HX UROLOGICAL  12/07/2020    Cystourethroscop    HX UROLOGICAL  12/07/2020     urethral dilation    HX UROLOGICAL  12/07/2020    bilateral retrograde pyelograms    HX UROLOGICAL Bilateral 12/07/2020    ureteral stent placements    HX UROLOGICAL  05/27/2021    cystoscopy     HX UROLOGICAL Bilateral 05/27/2021    retrograde pyelogram     HX UROLOGICAL Bilateral 05/27/2021    ureteral stent changes     HX UROLOGICAL  03/03/2022    Cystoscopy    HX UROLOGICAL Bilateral 03/03/2022    retrograde pyelograms    HX UROLOGICAL Bilateral 03/03/2022    ureteral stent exchange    NM FEMUR/KNEE SURG UNLISTED Right     FX Current Outpatient Medications   Medication Instructions    amitriptyline (ELAVIL) 25 mg, Oral, EVERY BEDTIME    bethanechol (URECHOLINE) 25 mg, Oral, 3 TIMES DAILY BEFORE MEALS    bisacodyL (DULCOLAX (BISACODYL)) 5 mg, Oral, DAILY PRN    calcium carb/vit D3/minerals (Calcium Carbonate-Vit D3-Min) 600 mg (1,500 mg)-200 unit chew No dose, route, or frequency recorded.  cephALEXin (KEFLEX) 500 mg, Oral, 4 TIMES DAILY    cholecalciferol (VITAMIN D3) 500 Units, Oral, DAILY    Dalfampridine-ER 12 HR (AMPYRA) 10 mg tablet Oral, EVERY 12 HOURS    ergocalciferol (ERGOCALCIFEROL) 1,250 mcg (50,000 unit) capsule TAKE 1 CAPSULE BY MOUTH EVERY MONTH FOR 4 MONTHS    furosemide (LASIX) 40 mg, Oral, AS NEEDED    gabapentin (NEURONTIN) 300 mg, Oral, 3 TIMES DAILY, PT states BID    Gilenya 0.5 mg cap No dose, route, or frequency recorded.     NALTREXONE TAKE ONE CAPSULE (4.5mg) BY MOUTH DAILY AT BEDTIME as directed    senna (Senna) 8.6 mg tablet 1 Tablet, Oral, DAILY    sodium bicarbonate 650 mg tablet Oral, 3 TIMES DAILY    tamsulosin (FLOMAX) 0.4 mg, Oral, DAILY    tiZANidine (ZANAFLEX) 2 mg tablet Oral, 3 TIMES DAILY    vit A/vit C/vit E/zinc/copper (PRESERVISION AREDS PO) Oral, 2 TIMES DAILY       Current Facility-Administered Medications   Medication Dose Route Frequency    sodium chloride 0.9 % bolus infusion 1,000 mL  1,000 mL IntraVENous ONCE    0.9% sodium chloride infusion  20 mL/hr IntraVENous CONTINUOUS    ceFAZolin (ANCEF) 2 g in sterile water (preservative free) 20 mL IV syringe  2 g IntraVENous ONCE       Patient Vitals for the past 24 hrs:   Temp Pulse Resp BP SpO2   05/02/22 0807 36.6 °C (97.8 °F) 78 16 (!) 140/85 100 %       Lab Results   Component Value Date/Time    WBC 3.2 (L) 04/26/2022 01:30 PM    HGB 10.2 (L) 04/26/2022 01:30 PM    HCT 34.7 (L) 04/26/2022 01:30 PM    PLATELET 725 24/75/1685 01:30 PM    MCV 87.8 04/26/2022 01:30 PM     Lab Results   Component Value Date/Time    Sodium 140 04/26/2022 01:30 PM    Potassium 5.3 (H) 04/26/2022 01:30 PM    Chloride 111 (H) 04/26/2022 01:30 PM    CO2 25 04/26/2022 01:30 PM    Anion gap 4 (L) 04/26/2022 01:30 PM    Glucose 82 04/26/2022 01:30 PM    BUN 64 (H) 04/26/2022 01:30 PM    Creatinine 6.32 (H) 04/26/2022 01:30 PM    BUN/Creatinine ratio 10 (L) 04/26/2022 01:30 PM    GFR est AA 8 (L) 04/26/2022 01:30 PM    GFR est non-AA 7 (L) 04/26/2022 01:30 PM    Calcium 9.1 04/26/2022 01:30 PM     No results found for: APTT, PTP, INR, INREXT  Lab Results   Component Value Date/Time    Glucose 82 04/26/2022 01:30 PM    Glucose (POC) 84 05/02/2022 08:16 AM     Physical Exam    Airway  Mallampati: II  TM Distance: 4 - 6 cm  Neck ROM: normal range of motion   Mouth opening: Normal     Cardiovascular    Rhythm: regular  Rate: normal         Dental    Dentition: Edentulous     Pulmonary  Breath sounds clear to auscultation               Abdominal  GI exam deferred       Other Findings            Anesthetic Plan    ASA: 4  Anesthesia type: general          Induction: Intravenous  Anesthetic plan and risks discussed with: Patient and Family

## 2022-05-02 NOTE — PROGRESS NOTES
TRANSFER - IN REPORT:    Verbal report received from Shonna(name) on Carol Justin  being received from [facility]) for routine post - op      Report consisted of patients Situation, Background, Assessment and   Recommendations(SBAR). Information from the following report(s) SBAR and OR Summary was reviewed with the receiving nurse. Opportunity for questions and clarification was provided. Assessment completed upon patients arrival to unit and care assumed. Call light within reach.

## 2022-05-03 LAB
ANION GAP SERPL CALC-SCNC: 6 MMOL/L (ref 5–15)
BUN SERPL-MCNC: 64 MG/DL (ref 6–20)
BUN/CREAT SERPL: 13 (ref 12–20)
CA-I BLD-MCNC: 8.2 MG/DL (ref 8.5–10.1)
CHLORIDE SERPL-SCNC: 115 MMOL/L (ref 97–108)
CO2 SERPL-SCNC: 19 MMOL/L (ref 21–32)
CREAT SERPL-MCNC: 4.95 MG/DL (ref 0.55–1.02)
ERYTHROCYTE [DISTWIDTH] IN BLOOD BY AUTOMATED COUNT: 13.2 % (ref 11.5–14.5)
GLUCOSE SERPL-MCNC: 87 MG/DL (ref 65–100)
HCT VFR BLD AUTO: 31 % (ref 35–47)
HGB BLD-MCNC: 8.9 G/DL (ref 11.5–16)
MCH RBC QN AUTO: 25.3 PG (ref 26–34)
MCHC RBC AUTO-ENTMCNC: 28.7 G/DL (ref 30–36.5)
MCV RBC AUTO: 88.1 FL (ref 80–99)
NRBC # BLD: 0 K/UL (ref 0–0.01)
NRBC BLD-RTO: 0 PER 100 WBC
PLATELET # BLD AUTO: 237 K/UL (ref 150–400)
PMV BLD AUTO: 10.2 FL (ref 8.9–12.9)
POTASSIUM SERPL-SCNC: 6.1 MMOL/L (ref 3.5–5.1)
RBC # BLD AUTO: 3.52 M/UL (ref 3.8–5.2)
SODIUM SERPL-SCNC: 140 MMOL/L (ref 136–145)
WBC # BLD AUTO: 6.3 K/UL (ref 3.6–11)

## 2022-05-03 PROCEDURE — 74011250637 HC RX REV CODE- 250/637: Performed by: NURSE PRACTITIONER

## 2022-05-03 PROCEDURE — 74011250636 HC RX REV CODE- 250/636: Performed by: NURSE PRACTITIONER

## 2022-05-03 PROCEDURE — 85027 COMPLETE CBC AUTOMATED: CPT

## 2022-05-03 PROCEDURE — 80048 BASIC METABOLIC PNL TOTAL CA: CPT

## 2022-05-03 PROCEDURE — 74011000250 HC RX REV CODE- 250: Performed by: INTERNAL MEDICINE

## 2022-05-03 PROCEDURE — 36415 COLL VENOUS BLD VENIPUNCTURE: CPT

## 2022-05-03 PROCEDURE — 65270000029 HC RM PRIVATE

## 2022-05-03 PROCEDURE — 74011250637 HC RX REV CODE- 250/637: Performed by: INTERNAL MEDICINE

## 2022-05-03 PROCEDURE — 74011000250 HC RX REV CODE- 250: Performed by: NURSE PRACTITIONER

## 2022-05-03 RX ORDER — SODIUM CHLORIDE 450 MG/100ML
100 INJECTION, SOLUTION INTRAVENOUS CONTINUOUS
Status: DISCONTINUED | OUTPATIENT
Start: 2022-05-03 | End: 2022-05-03

## 2022-05-03 RX ADMIN — SIMETHICONE 80 MG: 80 TABLET, CHEWABLE ORAL at 08:32

## 2022-05-03 RX ADMIN — BENZOCAINE AND MENTHOL 1 LOZENGE: 15; 3.6 LOZENGE ORAL at 16:47

## 2022-05-03 RX ADMIN — AMITRIPTYLINE HYDROCHLORIDE 25 MG: 25 TABLET, FILM COATED ORAL at 21:27

## 2022-05-03 RX ADMIN — TIZANIDINE 2 MG: 2 TABLET ORAL at 08:32

## 2022-05-03 RX ADMIN — DOCUSATE SODIUM 100 MG: 100 CAPSULE, LIQUID FILLED ORAL at 19:59

## 2022-05-03 RX ADMIN — WATER 2 G: 1 INJECTION INTRAMUSCULAR; INTRAVENOUS; SUBCUTANEOUS at 21:27

## 2022-05-03 RX ADMIN — TIZANIDINE 2 MG: 2 TABLET ORAL at 15:28

## 2022-05-03 RX ADMIN — WATER 2 G: 1 INJECTION INTRAMUSCULAR; INTRAVENOUS; SUBCUTANEOUS at 10:12

## 2022-05-03 RX ADMIN — GABAPENTIN 300 MG: 300 CAPSULE ORAL at 08:32

## 2022-05-03 RX ADMIN — SODIUM ZIRCONIUM CYCLOSILICATE 15 G: 10 POWDER, FOR SUSPENSION ORAL at 10:46

## 2022-05-03 RX ADMIN — SIMETHICONE 80 MG: 80 TABLET, CHEWABLE ORAL at 12:30

## 2022-05-03 RX ADMIN — GABAPENTIN 300 MG: 300 CAPSULE ORAL at 19:59

## 2022-05-03 RX ADMIN — SODIUM CHLORIDE, PRESERVATIVE FREE 10 ML: 5 INJECTION INTRAVENOUS at 21:40

## 2022-05-03 RX ADMIN — BISACODYL 10 MG: 10 SUPPOSITORY RECTAL at 06:18

## 2022-05-03 RX ADMIN — TIZANIDINE 2 MG: 2 TABLET ORAL at 21:26

## 2022-05-03 RX ADMIN — DOCUSATE SODIUM 100 MG: 100 CAPSULE, LIQUID FILLED ORAL at 08:32

## 2022-05-03 RX ADMIN — ACETAMINOPHEN 650 MG: 325 TABLET ORAL at 08:32

## 2022-05-03 RX ADMIN — SIMETHICONE 80 MG: 80 TABLET, CHEWABLE ORAL at 19:57

## 2022-05-03 RX ADMIN — SODIUM CHLORIDE 100 ML/HR: 4.5 INJECTION, SOLUTION INTRAVENOUS at 01:39

## 2022-05-03 RX ADMIN — TAMSULOSIN HYDROCHLORIDE 0.4 MG: 0.4 CAPSULE ORAL at 08:32

## 2022-05-03 NOTE — PROGRESS NOTES
UROLOGY Progress Note         [phone]      Daily Progress Note: 5/3/2022      Subjective: The patient is seen for UROLOGIC follow up after cysto, bilateral RPG, bilateral robotic ureteral reimplantation, bilateral ureteral stent placement, ureteral dilation on 5/2/22. She is doing well post-op. Passing flatus. Tolerating clear liquid diet. Clear, yellow urine draining. Problem List:  Patient Active Problem List   Diagnosis Code    Hydronephrosis N13.30    Fracture T14. 8XXA    H/O: hysterectomy Z90.710    Macular degeneration, right eye H35.30    Shingles B02.9    Multiple sclerosis (HCC) G35    Pain R52    Weakness R53.1    Neurogenic bladder N31.9    Bladder diverticulum N32.3    Acute cystitis without hematuria N30.00    Retained ureteral stent Z96.0    CKD (chronic kidney disease) N18.9    Mixed stress and urge urinary incontinence N39.46    Anemia D64.9    Pain in left leg M79.605    Acquired hydronephrosis due to obstruction of ureter N13.1         Medications reviewed  Current Facility-Administered Medications   Medication Dose Route Frequency    sodium zirconium cyclosilicate (LOKELMA) powder packet 15 g  15 g Oral NOW    sodium chloride (NS) flush 5-40 mL  5-40 mL IntraVENous Q8H    sodium chloride (NS) flush 5-40 mL  5-40 mL IntraVENous PRN    ondansetron (ZOFRAN) injection 4 mg  4 mg IntraVENous Q6H PRN    lidocaine 4 % patch 1 Patch  1 Patch TransDERmal Q12H    simethicone (MYLICON) tablet 80 mg  80 mg Oral QID    docusate sodium (COLACE) capsule 100 mg  100 mg Oral BID    HYDROcodone-acetaminophen (NORCO) 5-325 mg per tablet 1 Tablet  1 Tablet Oral Q4H PRN    bisacodyL (DULCOLAX) suppository 10 mg  10 mg Rectal DAILY    ceFAZolin (ANCEF) 2 g in sterile water (preservative free) 20 mL IV syringe  2 g IntraVENous Q12H    amitriptyline (ELAVIL) tablet 25 mg  25 mg Oral QHS    bisacodyL (DULCOLAX) tablet 5 mg  5 mg Oral DAILY PRN    gabapentin (NEURONTIN) capsule 300 mg  300 mg Oral BID    tamsulosin (FLOMAX) capsule 0.4 mg  0.4 mg Oral DAILY    tiZANidine (ZANAFLEX) tablet 2 mg  2 mg Oral TID    benzocaine-menthoL (CEPACOL) lozenge 1 Lozenge  1 Lozenge Mucous Membrane PRN    acetaminophen (TYLENOL) tablet 650 mg  650 mg Oral Q4H PRN       Review of Systems:   Review of Systems   Constitutional: Negative for chills. Gastrointestinal: Negative for abdominal pain, nausea and vomiting. Objective:   Physical Exam  Constitutional:       General: She is not in acute distress. Appearance: Normal appearance. Cardiovascular:      Rate and Rhythm: Normal rate. Pulmonary:      Effort: Pulmonary effort is normal. No respiratory distress. Abdominal:      General: Abdomen is flat. There is no distension. Genitourinary:     Comments: Velazco with clear, yellow urine  Musculoskeletal:      Comments: Wheelchair bound at baseline   Skin:     General: Skin is warm and dry. Neurological:      Mental Status: She is alert and oriented to person, place, and time.           Visit Vitals  /60   Pulse 86   Temp 98.8 °F (37.1 °C)   Resp 16   Ht 5' 3\" (1.6 m)   Wt 168 lb (76.2 kg)   SpO2 100%   BMI 29.76 kg/m²         Data Review:       Recent Days:  Recent Labs     05/03/22  0614   WBC 6.3   HGB 8.9*   HCT 31.0*        Recent Labs     05/03/22  0614 05/02/22  0831 05/02/22  0815     --  138   K 6.1* 4.4 4.6   *  --  108   CO2 19*  --  21   GLU 87  --  87   BUN 64*  --  73*   CREA 4.95*  --  5.11*   CA 8.2*  --  9.3       24 Hour Results:  Recent Results (from the past 24 hour(s))   METABOLIC PANEL, BASIC    Collection Time: 05/03/22  6:14 AM   Result Value Ref Range    Sodium 140 136 - 145 mmol/L    Potassium 6.1 (H) 3.5 - 5.1 mmol/L    Chloride 115 (H) 97 - 108 mmol/L    CO2 19 (L) 21 - 32 mmol/L    Anion gap 6 5 - 15 mmol/L    Glucose 87 65 - 100 mg/dL    BUN 64 (H) 6 - 20 mg/dL    Creatinine 4.95 (H) 0.55 - 1.02 mg/dL BUN/Creatinine ratio 13 12 - 20      GFR est AA 11 (L) >60 ml/min/1.73m2    GFR est non-AA 9 (L) >60 ml/min/1.73m2    Calcium 8.2 (L) 8.5 - 10.1 mg/dL   CBC W/O DIFF    Collection Time: 05/03/22  6:14 AM   Result Value Ref Range    WBC 6.3 3.6 - 11.0 K/uL    RBC 3.52 (L) 3.80 - 5.20 M/uL    HGB 8.9 (L) 11.5 - 16.0 g/dL    HCT 31.0 (L) 35.0 - 47.0 %    MCV 88.1 80.0 - 99.0 FL    MCH 25.3 (L) 26.0 - 34.0 PG    MCHC 28.7 (L) 30.0 - 36.5 g/dL    RDW 13.2 11.5 - 14.5 %    PLATELET 782 135 - 426 K/uL    MPV 10.2 8.9 - 12.9 FL    NRBC 0.0 0.0  WBC    ABSOLUTE NRBC 0.00 0.00 - 0.01 K/uL           Assessment/     Patient Active Problem List   Diagnosis Code    Hydronephrosis N13.30    Fracture T14. Ermalene Brighter H/O: hysterectomy Z90.710    Macular degeneration, right eye H35.30    Shingles B02.9    Multiple sclerosis (HCC) G35    Pain R52    Weakness R53.1    Neurogenic bladder N31.9    Bladder diverticulum N32.3    Acute cystitis without hematuria N30.00    Retained ureteral stent Z96.0    CKD (chronic kidney disease) N18.9    Mixed stress and urge urinary incontinence N39.46    Anemia D64.9    Pain in left leg M79.605    Acquired hydronephrosis due to obstruction of ureter N13.1       Plan:    POD 1:  · Nephrology, Dr. Bev Pederson to see patient. · Advance diet slowly. Patient comfortable, passing flatus. · Pain control is adequate. · Discharge when clear from Nephrology standpoint and tolerating regular diet. · IS as tolerated, goal 1500. · Continue ramírez on discharge.       Care Plan discussed with: Dr. Patricia Diaz, Attending Physician      David Cadet NP

## 2022-05-03 NOTE — PROGRESS NOTES
Bedside shift change report given to [address] [place] [address] & [facility] (oncoming nurse) by Mary Mancuso (offgoing nurse). Report included the following information SBAR, OR Summary, Intake/Output, MAR and Recent Results.

## 2022-05-03 NOTE — ROUTINE PROCESS
{[facility] BEDSIDE_VERBAL_shift change report given to ALOK Quiroz RN (oncoming nurse) by Saba Ponce RN (offgoing nurse).  Report included the following information from [employer]

## 2022-05-03 NOTE — ROUTINE PROCESS
Spoke with Dr Miller Woo concerning IV fluids of .045 sodium chloride with 20mEq of potassium. Message showed this is contraindicated in patients with chronic kidney disease.   Per Dr Miller Woo, IV fluids changed to 0.45 sodium chloride at 100ml/hr

## 2022-05-03 NOTE — PROGRESS NOTES
Reason for Admission:  Acquired Hydronephrosis                     RUR Score:    13%                 Plan for utilizing home health:      declined    PCP: First and Last name:  Smith Sims NP     Name of Practice:    Are you a current patient: Yes/No: yes   Approximate date of last visit: approx 6 months ago   Can you participate in a virtual visit with your PCP: yes                    Current Advanced Directive/Advance Care Plan: Full Code      Healthcare Decision Maker:  David Ayala, spouse, [phone]  Click here to complete [address] including selection of the Healthcare Decision Maker Relationship (ie \"Primary\")                             Transition of Care Plan:        Home self    CM met with patient to complete DCP assessment. Patient reports that she lives with her  in a mobile home with a ramp to the entrance. Patient states that she has a walker and a wheelchair however she more often utilizes the wheelchair. CM discussed CM role, patient declined any CM needs at this time. Current DCP is for patient to return home self care once medically stable. CM continues to follow and monitor for needs.

## 2022-05-03 NOTE — CONSULTS
[address] [facility] Renal Consult Note      NAME:  Pernell Oppenheim   :   1960   MRN:  [id number]     Requesting Physician Josué Frost MD   Reason for Consult:  Chronic daily stage IV     PCP:  Claudette Anis, NP     Date/Time:  5/3/2022 12:30 PM          Subjective:     CHIEF COMPLAINT: ureteral obstruction    HISTORY OF PRESENT ILLNESS:     Ms. Ailyn Polanco is a 63 y.o.  female who is admitted to the Surgical Service. She recently underwent a cystoscopy with bilateral robotic ureteral reimplantation and bilateral ureteral stent placements. She was found to have a neurogenic bladder and has a Velazco placed. She is well-known to our service as we follow her as an outpatient. We are asked to evaluate for CKD 4. Patient had surgery yesterday. She is doing well one day postop. She is able to tolerate oral fluids without nausea or vomiting. She denies shortness of breath or chest pain. No significant abdominal pain.     Past Medical History:   Diagnosis Date    Arthritis     Burning with urination     Chronic kidney disease     no dialysis    CKD (chronic kidney disease)     GERD (gastroesophageal reflux disease)     MS (multiple sclerosis) (HCC)     Nonneurogenic neurogenic bladder dysfunction     S/P ureteral stent placement         Past Surgical History:   Procedure Laterality Date    HX ANKLE FRACTURE TX Right     HX FEMUR FRACTURE TX      HX HYSTERECTOMY      HX OTHER SURGICAL      MS    HX UROLOGICAL  2020    Cystourethroscop    HX UROLOGICAL  2020     urethral dilation    HX UROLOGICAL  2020    bilateral retrograde pyelograms    HX UROLOGICAL Bilateral 2020    ureteral stent placements    HX UROLOGICAL  2021    cystoscopy     HX UROLOGICAL Bilateral 2021    retrograde pyelogram     HX UROLOGICAL Bilateral 2021    ureteral stent changes     HX UROLOGICAL  2022    Cystoscopy    HX UROLOGICAL Bilateral 03/03/2022    retrograde pyelograms    HX UROLOGICAL Bilateral 03/03/2022    ureteral stent exchange    HX UROLOGICAL  05/02/2022    cystoscopy     HX UROLOGICAL  05/02/2022    bilateral retrograde pyelogram    HX UROLOGICAL  05/02/2022    bilateral robotic ureteral reimplantation    NC FEMUR/KNEE SURG UNLISTED Right     FX       Social History     Tobacco Use    Smoking status: Never Smoker    Smokeless tobacco: Never Used   Substance Use Topics    Alcohol use: Not Currently     Comment: occasionally        Family History   Problem Relation Age of Onset    Hypertension Mother     Ovarian Cancer Mother     Cancer Mother     Hypertension Maternal Grandmother     Ovarian Cancer Maternal Grandmother     Cancer Maternal Grandmother     Hypertension Maternal Grandfather     Hypertension Paternal Grandmother     Hypertension Paternal Grandfather     Hypertension Father         Allergies   Allergen Reactions    Latex, Natural Rubber Itching     Per patient: \"just regular, no anaphylaxis\"      Betaseron [Interferon Beta-1b] Rash    Tysabri [Natalizumab] Hives        Prior to Admission medications    Medication Sig Start Date End Date Taking? Authorizing Provider   cephALEXin (KEFLEX) 500 mg capsule Take 1 Capsule by mouth four (4) times daily for 7 days. 4/26/22 5/3/22 Yes Mehdi Marrero NP   ergocalciferol (ERGOCALCIFEROL) 1,250 mcg (50,000 unit) capsule TAKE 1 CAPSULE BY MOUTH EVERY MONTH FOR 4 MONTHS 3/29/22  Yes Provider, Historical   furosemide (LASIX) 40 mg tablet Take 40 mg by mouth as needed. 3/29/22  Yes Provider, Historical   vit A/vit C/vit E/zinc/copper (PRESERVISION AREDS PO) Take  by mouth two (2) times a day. Yes Provider, Historical   bisacodyL (Dulcolax, bisacodyl,) 5 mg EC tablet Take 5 mg by mouth daily as needed for Constipation. Yes Provider, Historical   cholecalciferol (Vitamin D3) 25 mcg (1,000 unit) cap Take 500 Units by mouth daily.    Yes Provider, Historical   sodium bicarbonate 650 mg tablet Take  by mouth three (3) times daily. Yes Provider, Historical   tamsulosin (FLOMAX) 0.4 mg capsule Take 1 Capsule by mouth daily. 9/1/21  Yes Jcarlos Yanez MD   bethanechol (URECHOLINE) 25 mg tablet Take 1 Tablet by mouth Before breakfast, lunch, and dinner. 9/1/21  Yes Jcarlos Yanez MD   amitriptyline (ELAVIL) 25 mg tablet Take 25 mg by mouth nightly. Yes Provider, Historical   senna (Senna) 8.6 mg tablet Take 1 Tab by mouth daily. Yes Provider, Historical   calcium carb/vit D3/minerals (Calcium Carbonate-Vit D3-Min) 600 mg (1,500 mg)-200 unit chew  2/12/20  Yes Provider, Historical   Dalfampridine-ER 12 HR (AMPYRA) 10 mg tablet Take  by mouth every twelve (12) hours. Yes Provider, Historical   gabapentin (NEURONTIN) 300 mg capsule Take 300 mg by mouth three (3) times daily. PT states BID   Yes Provider, Historical   tiZANidine (ZANAFLEX) 2 mg tablet Take  by mouth three (3) times daily.    Yes Provider, Historical   Gilenya 0.5 mg cap  7/16/21   Provider, Historical         Current Facility-Administered Medications:     sodium chloride (NS) flush 5-40 mL, 5-40 mL, IntraVENous, Q8H, Arash Murphy NP    sodium chloride (NS) flush 5-40 mL, 5-40 mL, IntraVENous, PRN, Rolena Givens, NP    ondansetron [facility]) injection 4 mg, 4 mg, IntraVENous, Q6H PRN, Rolena Givens, NP    lidocaine 4 % patch 1 Patch, 1 Patch, TransDERmal, Q12H, Rolena Givens, NP, 1 Patch at 05/03/22 0834    simethicone (MYLICON) tablet 80 mg, 80 mg, Oral, QID, Rolena Givens, NP, 80 mg at 05/03/22 3952    docusate sodium (COLACE) capsule 100 mg, 100 mg, Oral, BID, Rolena Givens, NP, 100 mg at 05/03/22 7019    HYDROcodone-acetaminophen (NORCO) 5-325 mg per tablet 1 Tablet, 1 Tablet, Oral, Q4H PRN, Rolena Givens, NP    bisacodyL (DULCOLAX) suppository 10 mg, 10 mg, Rectal, DAILY, Rolena Givens, NP, 10 mg at 05/03/22 0618    ceFAZolin (ANCEF) 2 g in sterile water (preservative free) 20 mL IV syringe, 2 g, IntraVENous, Q12H, Yara Fischer NP, Last Rate: 400 mL/hr at 05/03/22 1012, 2 g at 05/03/22 1012    amitriptyline (ELAVIL) tablet 25 mg, 25 mg, Oral, QHS, Eddie Murphy NP, 25 mg at 05/02/22 2129    bisacodyL (DULCOLAX) tablet 5 mg, 5 mg, Oral, DAILY PRN, Yara Fischer NP    gabapentin (NEURONTIN) capsule 300 mg, 300 mg, Oral, BID, Yara Fischer NP, 300 mg at 05/03/22 7331    tamsulosin (FLOMAX) capsule 0.4 mg, 0.4 mg, Oral, DAILY, Eddie Murphy NP, 0.4 mg at 05/03/22 4817    tiZANidine (ZANAFLEX) tablet 2 mg, 2 mg, Oral, TID, Yara Fischer NP, 2 mg at 05/03/22 0414    benzocaine-menthoL (CEPACOL) lozenge 1 Lozenge, 1 Lozenge, Mucous Membrane, PRN, Yara Fischer NP, 1 Lozenge at 05/02/22 2203    acetaminophen (TYLENOL) tablet 650 mg, 650 mg, Oral, Q4H PRN, Yara Fischer NP, 650 mg at 05/03/22 9378      Review of Systems:  A comprehensive review of systems was negative except for that written in the HPI. Objective:      VITALS:    Vital signs reviewed; most recent are:    Visit Vitals  BP (!) 108/57   Pulse 84   Temp 98.8 °F (37.1 °C)   Resp 16   Ht 5' 3\" (1.6 m)   Wt 76.2 kg (168 lb)   SpO2 98%   BMI 29.76 kg/m²     SpO2 Readings from Last 6 Encounters:   05/03/22 98%   04/26/22 100%   04/26/22 99%   03/29/22 98%   03/03/22 100%   02/08/22 100%    O2 Flow Rate (L/min): 2 l/min       Intake/Output Summary (Last 24 hours) at 5/3/2022 1230  Last data filed at 5/3/2022 1100  Gross per 24 hour   Intake 4663.33 ml   Output 3050 ml   Net 1613.33 ml            Exam:   Physical Exam:General appearance: alert, cooperative, no distress, appears stated age  Head: Normocephalic, without obvious abnormality, atraumatic  Neck: supple, symmetrical, trachea midline, no adenopathy, thyroid: not enlarged, symmetric, no tenderness/mass/nodules and no JVD  Lungs: clear to auscultation bilaterally  Heart: regular rate and rhythm, no S3 or S4  Abdomen: soft, non-tender.  Bowel sounds normal. No masses,  no organomegaly  Extremities: no edema  Neurologic: alert and oriented x3. Decreased muscle strength lower extremity     LABS:  Recent Labs     05/03/22  0614 05/02/22  0831 05/02/22  0815     --  138   K 6.1* 4.4 4.6   *  --  108   CO2 19*  --  21   BUN 64*  --  73*   CREA 4.95*  --  5.11*   CA 8.2*  --  9.3     Recent Labs     05/03/22  0614   WBC 6.3   HGB 8.9*   HCT 31.0*        Lab Results   Component Value Date/Time    Glucose (POC) 84 05/02/2022 08:16 AM     No results for input(s): PH, PCO2, PO2, HCO3, FIO2 in the last 72 hours. No results for input(s): INR, INREXT in the last 72 hours. No results for input(s): RENETTA, KU, CLU, CREAU in the last 72 hours.     No lab exists for component: PROU    Assessment:   Renal Specific Problems  CKD 4 at baseline  Obstructive uropathy with neurogenic bladder  Hyperkalemia  Metabolic acidosis  Vit D2 def    Plan:     Obtain/ Order: labs/cultures/radiology/procedures:  renal panel        Therapeutic:    Can stop IVF once eating  Lokelma 1 dose for hyperkalemia      Risk of deterioration: low      Total time spent with patient: 30 Minutes                             ___________________________________________________    Attending Physician: Jose Navarrete MD

## 2022-05-04 VITALS
RESPIRATION RATE: 16 BRPM | TEMPERATURE: 98 F | OXYGEN SATURATION: 97 % | BODY MASS INDEX: 29.77 KG/M2 | SYSTOLIC BLOOD PRESSURE: 106 MMHG | WEIGHT: 168 LBS | HEIGHT: 63 IN | HEART RATE: 85 BPM | DIASTOLIC BLOOD PRESSURE: 63 MMHG

## 2022-05-04 LAB
ALBUMIN SERPL-MCNC: 2.8 G/DL (ref 3.5–5)
ANION GAP SERPL CALC-SCNC: 7 MMOL/L (ref 5–15)
BUN SERPL-MCNC: 55 MG/DL (ref 6–20)
BUN/CREAT SERPL: 10 (ref 12–20)
CA-I BLD-MCNC: 8.2 MG/DL (ref 8.5–10.1)
CHLORIDE SERPL-SCNC: 115 MMOL/L (ref 97–108)
CO2 SERPL-SCNC: 19 MMOL/L (ref 21–32)
CREAT SERPL-MCNC: 5.42 MG/DL (ref 0.55–1.02)
GLUCOSE SERPL-MCNC: 94 MG/DL (ref 65–100)
MAGNESIUM SERPL-MCNC: 2 MG/DL (ref 1.6–2.4)
PHOSPHATE SERPL-MCNC: 3.3 MG/DL (ref 2.6–4.7)
POTASSIUM SERPL-SCNC: 5.1 MMOL/L (ref 3.5–5.1)
SODIUM SERPL-SCNC: 141 MMOL/L (ref 136–145)

## 2022-05-04 PROCEDURE — 74011250636 HC RX REV CODE- 250/636: Performed by: NURSE PRACTITIONER

## 2022-05-04 PROCEDURE — 74011250637 HC RX REV CODE- 250/637: Performed by: INTERNAL MEDICINE

## 2022-05-04 PROCEDURE — 99024 POSTOP FOLLOW-UP VISIT: CPT | Performed by: NURSE PRACTITIONER

## 2022-05-04 PROCEDURE — 83735 ASSAY OF MAGNESIUM: CPT

## 2022-05-04 PROCEDURE — 80069 RENAL FUNCTION PANEL: CPT

## 2022-05-04 PROCEDURE — 74011250637 HC RX REV CODE- 250/637: Performed by: NURSE PRACTITIONER

## 2022-05-04 PROCEDURE — 36415 COLL VENOUS BLD VENIPUNCTURE: CPT

## 2022-05-04 PROCEDURE — 74011000250 HC RX REV CODE- 250: Performed by: NURSE PRACTITIONER

## 2022-05-04 RX ORDER — CEPHALEXIN 500 MG/1
500 CAPSULE ORAL 4 TIMES DAILY
Qty: 48 CAPSULE | Refills: 0 | Status: SHIPPED | OUTPATIENT
Start: 2022-05-04 | End: 2022-05-16

## 2022-05-04 RX ORDER — HYDROCODONE BITARTRATE AND ACETAMINOPHEN 5; 325 MG/1; MG/1
1 TABLET ORAL
Qty: 5 TABLET | Refills: 0 | Status: SHIPPED | OUTPATIENT
Start: 2022-05-04 | End: 2022-05-07

## 2022-05-04 RX ORDER — SODIUM BICARBONATE 650 MG/1
650 TABLET ORAL 2 TIMES DAILY
Status: DISCONTINUED | OUTPATIENT
Start: 2022-05-04 | End: 2022-05-04 | Stop reason: HOSPADM

## 2022-05-04 RX ADMIN — SODIUM CHLORIDE, PRESERVATIVE FREE 10 ML: 5 INJECTION INTRAVENOUS at 05:50

## 2022-05-04 RX ADMIN — WATER 2 G: 1 INJECTION INTRAMUSCULAR; INTRAVENOUS; SUBCUTANEOUS at 10:26

## 2022-05-04 RX ADMIN — GABAPENTIN 300 MG: 300 CAPSULE ORAL at 10:26

## 2022-05-04 RX ADMIN — SIMETHICONE 80 MG: 80 TABLET, CHEWABLE ORAL at 00:21

## 2022-05-04 RX ADMIN — TIZANIDINE 2 MG: 2 TABLET ORAL at 10:26

## 2022-05-04 RX ADMIN — TAMSULOSIN HYDROCHLORIDE 0.4 MG: 0.4 CAPSULE ORAL at 10:26

## 2022-05-04 RX ADMIN — DOCUSATE SODIUM 100 MG: 100 CAPSULE, LIQUID FILLED ORAL at 10:26

## 2022-05-04 RX ADMIN — SIMETHICONE 80 MG: 80 TABLET, CHEWABLE ORAL at 12:39

## 2022-05-04 RX ADMIN — SIMETHICONE 80 MG: 80 TABLET, CHEWABLE ORAL at 10:26

## 2022-05-04 RX ADMIN — SODIUM BICARBONATE 650 MG: 650 TABLET ORAL at 12:43

## 2022-05-04 NOTE — PROGRESS NOTES
Patient discharging home today, no needs. Discharge plan of care/case management plan validated with provider discharge order.

## 2022-05-04 NOTE — PROGRESS NOTES
Discharge instructions reviewed with patient and her daughter. Pt stated she had no further questions. Velazco emptying and care instructions reviewed with patient. Prescriptions were sent to patient's preferred pharmacy and follow up appointments were made. Pt was wheeled down in her personal wheelchair by staff and left in a private vehicle with her daughter.

## 2022-05-04 NOTE — PROGRESS NOTES
[facility] KIDNEY     Renal Daily Progress Note:     Admission Date: 2022     Subjective:  Doing ok 2 days post op. No significant change inn serum creatinine. Making urine. Not SOB, no N/V, able to eat. No chest or abd pain. Review of Systems  Pertinent items are noted in HPI. Objective:     Visit Vitals  /70 (BP 1 Location: Right upper arm, BP Patient Position: Semi fowlers; At rest)   Pulse 89   Temp 98.1 °F (36.7 °C)   Resp 16   Ht 5' 3\" (1.6 m)   Wt 76.2 kg (168 lb)   LMP  (LMP Unknown)   SpO2 99%   BMI 29.76 kg/m²     Temp (24hrs), Av.5 °F (36.9 °C), Min:97.8 °F (36.6 °C), Max:99.1 °F (37.3 °C)        Intake/Output Summary (Last 24 hours) at 2022 1040  Last data filed at 2022 0553  Gross per 24 hour   Intake 1426.67 ml   Output 2525 ml   Net -1098.33 ml     Current Facility-Administered Medications   Medication Dose Route Frequency    sodium chloride (NS) flush 5-40 mL  5-40 mL IntraVENous Q8H    sodium chloride (NS) flush 5-40 mL  5-40 mL IntraVENous PRN    ondansetron (ZOFRAN) injection 4 mg  4 mg IntraVENous Q6H PRN    lidocaine 4 % patch 1 Patch  1 Patch TransDERmal Q12H    simethicone (MYLICON) tablet 80 mg  80 mg Oral QID    docusate sodium (COLACE) capsule 100 mg  100 mg Oral BID    HYDROcodone-acetaminophen (NORCO) 5-325 mg per tablet 1 Tablet  1 Tablet Oral Q4H PRN    bisacodyL (DULCOLAX) suppository 10 mg  10 mg Rectal DAILY    amitriptyline (ELAVIL) tablet 25 mg  25 mg Oral QHS    bisacodyL (DULCOLAX) tablet 5 mg  5 mg Oral DAILY PRN    gabapentin (NEURONTIN) capsule 300 mg  300 mg Oral BID    tamsulosin (FLOMAX) capsule 0.4 mg  0.4 mg Oral DAILY    tiZANidine (ZANAFLEX) tablet 2 mg  2 mg Oral TID    benzocaine-menthoL (CEPACOL) lozenge 1 Lozenge  1 Lozenge Mucous Membrane PRN    acetaminophen (TYLENOL) tablet 650 mg  650 mg Oral Q4H PRN       Physical Exam:General appearance: alert, cooperative, no distress, appears stated age  Head: Normocephalic, without obvious abnormality, atraumatic  Neck: supple, symmetrical, trachea midline, no adenopathy, thyroid: not enlarged, symmetric, no tenderness/mass/nodules, no carotid bruit and no JVD  Lungs: clear to auscultation bilaterally  Heart: regular rate and rhythm, no S3 or S4  Abdomen: soft, non-tender. Bowel sounds normal. No masses,  no organomegaly  Extremities: no edema  Neurologic: Grossly normal    Data Review:     LABS:  Recent Labs     05/04/22  0245 05/03/22  0614 05/02/22  0831 05/02/22  0815 05/02/22  0815    140  --   --  138   K 5.1 6.1* 4.4   < > 4.6   * 115*  --   --  108   CO2 19* 19*  --   --  21   BUN 55* 64*  --   --  73*   CREA 5.42* 4.95*  --   --  5.11*   CA 8.2* 8.2*  --   --  9.3   ALB 2.8*  --   --   --   --    PHOS 3.3  --   --   --   --    MG 2.0  --   --   --   --     < > = values in this interval not displayed. Recent Labs     05/03/22  0614   WBC 6.3   HGB 8.9*   HCT 31.0*        No results for input(s): RENETTA, KU, CLU, CREAU in the last 72 hours. No lab exists for component: PROU    Assessment:   Renal Specific Problems    CKD 4 at baseline  Obstructive uropathy with neurogenic bladder  Hyperkalemia- improved  Metabolic acidosis  Vit D2 def        Plan:     Obtain/ Order: labs/cultures/radiology/procedures:          Therapeutic:    Ok for d/c, will f/u with me in 2-3 weeks  Cont NaHCO3. May take several weeks to see azotemia from chronic obstruction to improve. She may ultimately need dialuysis in the next several months if that does not happen.  This was discussed with patient        Kathleen Doran MD    [phone]

## 2022-05-04 NOTE — DISCHARGE SUMMARY
UROLOGY Progress Note         [phone]        PROVIDER DISCHARGE SUMMARY     Patient ID:    Magali Lindsay  [id number]  63 y.o.  1960    Admit date: 5/2/2022    Discharge date : 5/4/2022    Diagnoses: hydronephrosis with ureteral stricture N13.1    Reason for Hospitalization: surgery, or treatment for above mentioned diagnosis, hyperkalemia, incomplete emptying of the bladder. Procedures: Cystoscopy, bilateral retrograde pyelogram , bilateral robotic ureteral reimplantation, BILATERAL  ureteral stent placement, ureteral dilation on 5/2/22. Hospital Course: Uncomplicated hospital course following surgery. Pain is well-controlled. Patient is tolerating a diet. Labs are reviewed and felt to be satisfactory for discharge. Nephrology has signed off. Incision sites are clean, dry and intact. Follow up Care: Your appointment with us is on: May 24th at 9:45 for stent removal.   You do not need to keep your 5/10/22 appointment date. Discharge Medications:   Current Discharge Medication List      START taking these medications    Details   HYDROcodone-acetaminophen (NORCO) 5-325 mg per tablet Take 1 Tablet by mouth every four (4) hours as needed for Pain for up to 3 days. Max Daily Amount: 6 Tablets. Qty: 5 Tablet, Refills: 0  Start date: 5/4/2022, End date: 5/7/2022    Associated Diagnoses: Hydronephrosis with ureteral stricture, not elsewhere classified         CONTINUE these medications which have CHANGED    Details   cephALEXin (KEFLEX) 500 mg capsule Take 1 Capsule by mouth four (4) times daily for 12 days. Qty: 48 Capsule, Refills: 0  Start date: 5/4/2022, End date: 5/16/2022    Associated Diagnoses: Bladder diverticulum;  Neurogenic bladder; Retained ureteral stent         CONTINUE these medications which have NOT CHANGED    Details   ergocalciferol (ERGOCALCIFEROL) 1,250 mcg (50,000 unit) capsule TAKE 1 CAPSULE BY MOUTH EVERY MONTH FOR 4 MONTHS      furosemide (LASIX) 40 mg tablet Take 40 mg by mouth as needed. vit A/vit C/vit E/zinc/copper (PRESERVISION AREDS PO) Take  by mouth two (2) times a day. bisacodyL (Dulcolax, bisacodyl,) 5 mg EC tablet Take 5 mg by mouth daily as needed for Constipation. cholecalciferol (Vitamin D3) 25 mcg (1,000 unit) cap Take 500 Units by mouth daily. sodium bicarbonate 650 mg tablet Take  by mouth three (3) times daily. tamsulosin (FLOMAX) 0.4 mg capsule Take 1 Capsule by mouth daily. Qty: 90 Capsule, Refills: 3    Comments: Requesting 1 year supply      bethanechol (URECHOLINE) 25 mg tablet Take 1 Tablet by mouth Before breakfast, lunch, and dinner. Qty: 270 Tablet, Refills: 3      amitriptyline (ELAVIL) 25 mg tablet Take 25 mg by mouth nightly. senna (Senna) 8.6 mg tablet Take 1 Tab by mouth daily. calcium carb/vit D3/minerals (Calcium Carbonate-Vit D3-Min) 600 mg (1,500 mg)-200 unit chew       Dalfampridine-ER 12 HR (AMPYRA) 10 mg tablet Take  by mouth every twelve (12) hours. gabapentin (NEURONTIN) 300 mg capsule Take 300 mg by mouth three (3) times daily. PT states BID      tiZANidine (ZANAFLEX) 2 mg tablet Take  by mouth three (3) times daily. Gilenya 0.5 mg cap              Diet: ok to resume home diet. Do not overeat. Use your bowel regimen to facilitate regular, soft, formed bowel movements. If you are experiencing discomfort on post-op day 1-3, stick to a bland, liquid diet until you are feeling less uncomfortable, or having bowel movements. Activity: As per your discharge instructions. No heavy lifting or strenuous exercise. No soaking in tubs or pools. Do not drive on narcotic pain medications. Disposition:       Discharge Exam:  Physical Exam  Vitals reviewed. Constitutional:       General: She is not in acute distress. Appearance: Normal appearance. Pulmonary:      Effort: Pulmonary effort is normal.   Abdominal:      General: Abdomen is flat. There is no distension. Comments: Had BM   Genitourinary:     Comments: Velazco with clear, yellow urine  Skin:     General: Skin is warm and dry. Neurological:      Mental Status: She is alert and oriented to person, place, and time. Visit Vitals  /66 (BP 1 Location: Right upper arm, BP Patient Position: Lying)   Pulse 89   Temp 98.1 °F (36.7 °C)   Resp 16   Ht 5' 3\" (1.6 m)   Wt 168 lb (76.2 kg)   SpO2 97%   BMI 29.76 kg/m²       Significant Diagnostic Studies:   5/2/2022: BUN 73 mg/dL (H; Ref range: 6 - 20 mg/dL); Calcium 9.3 mg/dL (Ref range: 8.5 - 10.1 mg/dL); CO2 21 mmol/L (Ref range: 21 - 32 mmol/L); Creatinine 5.11 mg/dL (H; Ref range: 0.55 - 1.02 mg/dL); Glucose 87 mg/dL (Ref range: 65 - 100 mg/dL); Potassium 4.6 mmol/L (Ref range: 3.5 - 5.1 mmol/L); Potassium 4.4 mmol/L (Ref range: 3.5 - 5.1 mmol/L); Sodium 138 mmol/L (Ref range: 136 - 145 mmol/L)  5/3/2022: BUN 64 mg/dL (H; Ref range: 6 - 20 mg/dL); Calcium 8.2 mg/dL (L; Ref range: 8.5 - 10.1 mg/dL); CO2 19 mmol/L (L; Ref range: 21 - 32 mmol/L); Creatinine 4.95 mg/dL (H; Ref range: 0.55 - 1.02 mg/dL); Glucose 87 mg/dL (Ref range: 65 - 100 mg/dL); HCT 31.0 % (L; Ref range: 35.0 - 47.0 %); HGB 8.9 g/dL (L; Ref range: 11.5 - 16.0 g/dL); Potassium 6.1 mmol/L (H; Ref range: 3.5 - 5.1 mmol/L); Sodium 140 mmol/L (Ref range: 136 - 145 mmol/L)  Recent Labs     05/03/22  0614   WBC 6.3   HGB 8.9*   HCT 31.0*        Recent Labs     05/04/22  0245 05/03/22  0614 05/02/22  0831 05/02/22  0815 05/02/22  0815    140  --   --  138   K 5.1 6.1* 4.4   < > 4.6   * 115*  --   --  108   CO2 19* 19*  --   --  21   BUN 55* 64*  --   --  73*   CREA 5.42* 4.95*  --   --  5.11*   GLU 94 87  --   --  87   CA 8.2* 8.2*  --   --  9.3   MG 2.0  --   --   --   --    PHOS 3.3  --   --   --   --     < > = values in this interval not displayed.      Recent Labs     05/04/22 0245   ALB 2.8*       Lab Results   Component Value Date/Time    Glucose (POC) 84 05/02/2022 08:16 AM INPATIENT CONSULTATIONS: Nephrology, Dr. Lisbeth Buchanan REMINDERS: leave ramírez on discharge, 2 week follow up per Dr. Delvin Hammonds for stent removal.  Can cancel 5/10/22 visit. SURGICAL DRAINS/TUBES ON DISCHARGE: ureteral stent, ramírez catheter. PLAN FOR DISCHARGE. PATIENT TO FOLLOW UP AS SCHEDULED. NURSING STAFF TO PROVIDE EDUCATION MATERIALS AND DISCHARGE INSTRUCTION SET SPECIFIC TO THIS PATIENT AS REQUESTED BY ME. POST-OPERATIVE COUNSELING PROVIDED BY ME PRIOR TO DISCHARGE.       Signed:  Yulia David NP  5/4/2022  11:26 AM .

## 2022-05-04 NOTE — PROGRESS NOTES
Pt has a discharge order in for today. Pt is being discharged home self care. Pt has been cleared by attending provider. Pt is not being discharged with an IV or drain. Velazco catheter is to remain in place upon discharge. Vital signs stable. Pt shows no signs of distress. Discharge plan of care/case management plan validated with provider discharge order.

## 2022-05-23 NOTE — PROGRESS NOTES
HISTORY OF PRESENT ILLNESS  Ananya Ortega is a 63 y.o. female. Chief Complaint   Patient presents with    Post OP Follow Up    Cystoscopy    Hydronephrosis     Past Medical History:  PMHx (including negatives):  has a past medical history of Arthritis, Burning with urination, Chronic kidney disease, CKD (chronic kidney disease), GERD (gastroesophageal reflux disease), MS (multiple sclerosis) ([address] Nonneurogenic neurogenic bladder dysfunction, and S/P ureteral stent placement. PSurgHx:  has a past surgical history that includes hx hysterectomy; hx femur fracture tx; hx ankle fracture tx (Right); pr femur/knee surg unlisted (Right); hx other surgical; hx urological (12/07/2020); hx urological (12/07/2020); hx urological (12/07/2020); hx urological (Bilateral, 12/07/2020); hx urological (05/27/2021); hx urological (Bilateral, 05/27/2021); hx urological (Bilateral, 05/27/2021); hx urological (03/03/2022); hx urological (Bilateral, 03/03/2022); hx urological (Bilateral, 03/03/2022); hx urological (05/02/2022); hx urological (05/02/2022); hx urological (05/02/2022); hx urological (Bilateral, 05/02/2022); hx urological (05/02/2022); and hx urological.  PSocHx:  reports that she has never smoked. She has never used smokeless tobacco. She reports previous alcohol use. She reports that she does not use drugs. She is s/p cystoscopy, bilateral retrograde pyelogram , bilateral robotic ureteral reimplantation, BILATERAL ureteral stent placement, ureteral dilation on 5/2/22.     Findings: Extensive pelvic adhesions.     Interpretation of left retrograde pyelogram:  Normal appearing left ureter and renal pelvis.  No strictures, dilation, radiopacities or filling defects seen.  The calyces were sharp and pristine.     Interpretation of right retrograde pyelogram:  Normal appearing right ureter and renal pelvis.  No strictures, dilation, radiopacities or filling defects seen.  The calyces were sharp and pristine. She has a neurogenic bladder. A ramírez was placed prior to her surgery for rising creatinine. She was discharged with the ramírez. Results for Josue Kumar (MRN [id number]) as of 5/23/2022 11:05   Ref. Range 4/26/2022 13:30 5/2/2022 08:15 5/3/2022 06:14 5/4/2022 02:45   Creatinine Latest Ref Range: 0.55 - 1.02 mg/dL 6.32 (H) 5.11 (H) 4.95 (H) 5.42 (H)     She is followed by Dr. Pito Teran, Nephrology. 2020 creatinine was 1.86  2021 creatinine was 3.9    She was discharged on keflex based on her last urine culture. Chronic Conditions Addressed Today     1. Hydronephrosis - Primary     Overview      US done on 4/7/21 reads bilateral ureteral stents with moderate hydronephrosis despite the placement of stents ([id number]). 5/21/21: persistent hydronephrosis with CKD, MS and bladder diverticulum. 5/27/21 ureteral stent exchange. 6F double J stent bilaterally. 3/3/22: Cystoscopy, retrograde pyelograms, BILATERAL ureteral stent exchange. 4/27/22: Bilateral obstruction at the level of bladder insertion. 5/2/22: she is s/p cystoscopy, bilateral retrograde pyelogram , bilateral robotic ureteral dilation          2. Neurogenic bladder     Overview      She has a neurogenic bladder secondary to multiple sclerosis with previous history of urinary retention along with bilateral hydronephrosis treated with bilateral stent placement on 12/7/2020 by Dr. Leonidas Christensen with assistance from Dr. Pranav Bolton. She continues to be managed with ureteral stents. After activity and high-pressure storage is a concern. 4/26/22: Worsening renal function. Ramírez catheter was placed. She may end up needing long-term catheterization. 5/2/22: she is s/p cystoscopy, bilateral retrograde pyelogram , bilateral robotic ureteral reimplantation, BILATERAL ureteral stent placement, ureteral dilation on 5/2/22. Ramírez exchanged same date.          3. Acute cystitis without hematuria     Overview      9/17/20: cultrue with Streptococci, beta hemolyticgroup B(predominating) and E coli, greater than 100,000 colony forming units per mL.     11/13/20: culture with Citrobacter freundii,  greater than 100,000 colony forming units per mL.     12/7/21: culture with Pseudomonas aeruginosa, greater than 100,000 colony forming units per mL.    3/21/21: culture with Beta hemolytic Streptococcus, group B, greater than 100,000 colony forming units per mL.    5/21/21: culture with Beta hemolytic Streptococcus, group B, greater than 100,000 colony forming units per mL. She was prescribed Macrobid x 2 weeks. 3/3/22: urine was cloudy at time of stent exchange. NO growth on culture. 4/26/22: E coli and proteus mirabilis on culture. 5/2/22: s/p ureteral re-implantation. She was discharged on 2 weeks of keflex. 4. Retained ureteral stent     Overview      12/7/2020 bilateral ureteral stent placement by Dr. Jennifer Lindo with assistance from Dr. Leelee Huang. 5/27/21: stent exchange. Treated with nitrofurantoin on 5/27/21.    3/3/22: Cystoscopy, retrograde pyelograms, BILATERAL ureteral stent exchange. 5/2/22: she is s/p cystoscopy, bilateral retrograde pyelogram , bilateral robotic ureteral reimplantation, BILATERAL ureteral stent placement, ureteral dilation on 5/2/22. 5. CKD (chronic kidney disease)     Overview      CKD, followed by Dr. Slim Cartwright, Nephrology. 12/3/2020 Cr was 1.86  4/26/21 Cr was 3.8  4/26/22 Cr was 6.32    5/2/22: she is s/p cystoscopy, bilateral retrograde pyelogram , bilateral robotic ureteral reimplantation, BILATERAL ureteral stent placement, ureteral dilation on 5/2/22. 5/4/22: creatinine 5.42                    ROS  Patient denies the symptoms of COVID-19 per routine screening guidelines.     Physical Exam    CYSTOSCOPY/ STENT REMOVAL  Patient presented for cystoscopy and ureteral stent removal.  The patient was placed supine on the procedure table and the genitals were prepped and with chlorhexidine. Using 16 Croatian flexible cystoscope, cystourethroscopy was performed. The urethra was patent without stricturing. The visualized portions of the bladder mucosa was without trabeculation, tumors or concerning erythema. There was a stent in the Bilateral ureters. Each was grasped and withdrawn intact. Urine from the scope was sent for culture. ASSESSMENT and PLAN  Diagnoses and all orders for this visit:    1. Hydronephrosis with ureteral stricture, not elsewhere classified  Assessment & Plan:  She has a neurogenic bladder and had bilateral hydronephrosis managed stents. She is now status post ureteral reimplantation bilaterally on 5/2/2022. Stents are removed today. 2. Retained ureteral stent  Assessment & Plan:  Stents were removed today in the office. Last urine culture had pansensitive E. coli. I will keep her on a low-dose of Keflex for 3 weeks    Orders:  -     AMB POC URINALYSIS DIP STICK AUTO W/O MICRO    3. Stage 5 chronic kidney disease not on chronic dialysis [facility])  Assessment & Plan:   Chronic kidney disease likely exacerbated by neurogenic bladder. BMP today. She says she has follow up in 2 weeks with Dr. Kelley Cano. Orders:  -     METABOLIC PANEL, BASIC; Future    4. Neurogenic bladder  Assessment & Plan:  Bladder overactivity and high-pressure storage are concerned. Discontinue bethanechol. Okay to continue tamsulosin. Amitriptyline may or may not affect her bladder function. I recommend a bladder relaxant. Tolterodine prescribed    Orders:  -     AMB POC URINALYSIS DIP STICK AUTO W/O MICRO  -     METABOLIC PANEL, BASIC; Future    5. Acute cystitis without hematuria  -     AMB POC URINALYSIS DIP STICK AUTO W/O MICRO    Other orders  -     cephALEXin (KEFLEX) 250 mg capsule; Take 1 Capsule by mouth two (2) times a day. -     tolterodine ER (DETROL LA) 4 mg ER capsule; Take 1 Capsule by mouth daily.              Pravin Mcwilliams may have a reminder for a \"due or due soon\" health maintenance. The patient has been encouraged to contact their primary care provider for follow-up on this health maintenance or other necessary and/or routine health screening.      Carlos Augustin MD

## 2022-05-24 ENCOUNTER — OFFICE VISIT (OUTPATIENT)
Dept: UROLOGY | Age: 62
End: 2022-05-24
Payer: COMMERCIAL

## 2022-05-24 VITALS
WEIGHT: 168 LBS | OXYGEN SATURATION: 100 % | BODY MASS INDEX: 29.77 KG/M2 | DIASTOLIC BLOOD PRESSURE: 78 MMHG | SYSTOLIC BLOOD PRESSURE: 131 MMHG | TEMPERATURE: 97.3 F | HEART RATE: 75 BPM | HEIGHT: 63 IN

## 2022-05-24 DIAGNOSIS — N13.1 HYDRONEPHROSIS WITH URETERAL STRICTURE, NOT ELSEWHERE CLASSIFIED: Primary | ICD-10-CM

## 2022-05-24 DIAGNOSIS — N18.5 STAGE 5 CHRONIC KIDNEY DISEASE NOT ON CHRONIC DIALYSIS (HCC): ICD-10-CM

## 2022-05-24 DIAGNOSIS — Z96.0 RETAINED URETERAL STENT: ICD-10-CM

## 2022-05-24 DIAGNOSIS — N31.9 NEUROGENIC BLADDER: ICD-10-CM

## 2022-05-24 DIAGNOSIS — N30.00 ACUTE CYSTITIS WITHOUT HEMATURIA: ICD-10-CM

## 2022-05-24 PROBLEM — N32.3 BLADDER DIVERTICULUM: Status: RESOLVED | Noted: 2021-05-20 | Resolved: 2022-05-24

## 2022-05-24 LAB
BILIRUB UR QL: NEGATIVE
GLUCOSE UR-MCNC: NEGATIVE MG/DL
KETONES P FAST UR STRIP-MCNC: NEGATIVE MG/DL
PH UR STRIP: 7 [PH] (ref 4.6–8)
PROT UR QL STRIP: 100
SP GR UR STRIP: 1.01 (ref 1–1.03)
UA UROBILINOGEN AMB POC: NORMAL (ref 0.2–1)
URINALYSIS CLARITY POC: CLEAR
URINALYSIS COLOR POC: YELLOW
URINE BLOOD POC: NORMAL
URINE LEUKOCYTES POC: NORMAL
URINE NITRITES POC: NEGATIVE

## 2022-05-24 PROCEDURE — 52310 CYSTOSCOPY AND TREATMENT: CPT | Performed by: UROLOGY

## 2022-05-24 PROCEDURE — 81003 URINALYSIS AUTO W/O SCOPE: CPT | Performed by: UROLOGY

## 2022-05-24 RX ORDER — CEPHALEXIN 250 MG/1
250 CAPSULE ORAL 2 TIMES DAILY
Qty: 42 CAPSULE | Refills: 0 | Status: SHIPPED | OUTPATIENT
Start: 2022-05-24 | End: 2022-05-31 | Stop reason: ALTCHOICE

## 2022-05-24 RX ORDER — TOLTERODINE 4 MG/1
4 CAPSULE, EXTENDED RELEASE ORAL DAILY
Qty: 30 CAPSULE | Refills: 3 | Status: SHIPPED | OUTPATIENT
Start: 2022-05-24 | End: 2022-08-23

## 2022-05-24 NOTE — LETTER
5/24/2022    Patient: Amie Rainey   YOB: 1960   Date of Visit: 5/24/2022     Robert Menezes NP  [address] [place] [address]  [phone]  Via Fax: [phone]    Dear Robert Menezes NP,      Thank you for referring Ms. Prabhu Mcfarlane to [place] [address] for evaluation. My notes for this consultation are attached. If you have questions, please do not hesitate to call me. I look forward to following your patient along with you.       Sincerely,    Shoaib Good MD

## 2022-05-24 NOTE — ASSESSMENT & PLAN NOTE
Bladder overactivity and high-pressure storage are concerned. Discontinue bethanechol. Okay to continue tamsulosin. Amitriptyline may or may not affect her bladder function. I recommend a bladder relaxant.   Tolterodine prescribed

## 2022-05-24 NOTE — ASSESSMENT & PLAN NOTE
She has a neurogenic bladder and had bilateral hydronephrosis managed stents. She is now status post ureteral reimplantation bilaterally on 5/2/2022. Stents are removed today.

## 2022-05-24 NOTE — ASSESSMENT & PLAN NOTE
Chronic kidney disease likely exacerbated by neurogenic bladder. BMP today. She says she has follow up in 2 weeks with Dr. Dee Dee Villasenor.

## 2022-05-24 NOTE — ASSESSMENT & PLAN NOTE
Stents were removed today in the office. Last urine culture had pansensitive E. coli.   I will keep her on a low-dose of Keflex for 3 weeks

## 2022-05-24 NOTE — PROGRESS NOTES
Chief Complaint   Patient presents with    Post OP Follow Up    Cystoscopy    Hydronephrosis       PHQ-9 score is    Negative    Vitals:    05/24/22 1001   BP: 131/78   Pulse: 75   Temp: 97.3 °F (36.3 °C)   TempSrc: Temporal   SpO2: 100%   Weight: 168 lb (76.2 kg)   Height: 5' 3\" (1.6 m)   PainSc:   0 - No pain      1. \"Have you been to the ER, urgent care clinic since your last visit? Hospitalized since your last visit? \" No    2. \"Have you seen or consulted any other health care providers outside of the [address] [place] [address] since your last visit? \" No     3. For patients aged 39-70: Has the patient had a colonoscopy / FIT/ Cologuard? No      If the patient is female:    4. For patients aged 41-77: Has the patient had a mammogram within the past 2 years? No      5. For patients aged 21-65: Has the patient had a pap smear?  No

## 2022-05-25 LAB
BUN SERPL-MCNC: 52 MG/DL (ref 8–27)
BUN/CREAT SERPL: 12 (ref 12–28)
CALCIUM SERPL-MCNC: 9 MG/DL (ref 8.7–10.3)
CHLORIDE SERPL-SCNC: 104 MMOL/L (ref 96–106)
CO2 SERPL-SCNC: 20 MMOL/L (ref 20–29)
CREAT SERPL-MCNC: 4.21 MG/DL (ref 0.57–1)
EGFR: 11 ML/MIN/1.73
GLUCOSE SERPL-MCNC: 83 MG/DL (ref 65–99)
POTASSIUM SERPL-SCNC: 4.7 MMOL/L (ref 3.5–5.2)
SODIUM SERPL-SCNC: 141 MMOL/L (ref 134–144)

## 2022-05-28 LAB — BACTERIA UR CULT: ABNORMAL

## 2022-05-31 RX ORDER — CIPROFLOXACIN 500 MG/1
500 TABLET ORAL 2 TIMES DAILY
Qty: 14 TABLET | Refills: 0 | Status: SHIPPED | OUTPATIENT
Start: 2022-05-31 | End: 2022-06-07

## 2022-06-28 LAB
BUN SERPL-MCNC: 64 MG/DL (ref 8–27)
BUN/CREAT SERPL: 11 (ref 12–28)
CALCIUM SERPL-MCNC: 9.4 MG/DL (ref 8.7–10.3)
CHLORIDE SERPL-SCNC: 103 MMOL/L (ref 96–106)
CO2 SERPL-SCNC: 21 MMOL/L (ref 20–29)
CREAT SERPL-MCNC: 5.76 MG/DL (ref 0.57–1)
EGFR: 8 ML/MIN/1.73
GLUCOSE SERPL-MCNC: 71 MG/DL (ref 65–99)
POTASSIUM SERPL-SCNC: 5.2 MMOL/L (ref 3.5–5.2)
SODIUM SERPL-SCNC: 141 MMOL/L (ref 134–144)

## 2022-08-10 PROBLEM — Z96.0 RETAINED URETERAL STENT: Status: RESOLVED | Noted: 2021-05-20 | Resolved: 2022-08-10

## 2022-08-10 NOTE — PROGRESS NOTES
HISTORY OF PRESENT ILLNESS  Felicia Ratliff is a 63 y.o. female. Chief Complaint   Patient presents with    Follow-up    Hydronephrosis    Neurogenic Bladder    Bladder Infection     Past Medical History:  PMHx (including negatives):  has a past medical history of Arthritis, Burning with urination, Chronic kidney disease, CKD (chronic kidney disease), GERD (gastroesophageal reflux disease), MS (multiple sclerosis) ([address] Nonneurogenic neurogenic bladder dysfunction, and S/P ureteral stent placement. PSurgHx:  has a past surgical history that includes hx hysterectomy; hx femur fracture tx; hx ankle fracture tx (Right); pr femur/knee surg unlisted (Right); hx other surgical; hx urological (12/07/2020); hx urological (12/07/2020); hx urological (12/07/2020); hx urological (Bilateral, 12/07/2020); hx urological (05/27/2021); hx urological (Bilateral, 05/27/2021); hx urological (Bilateral, 05/27/2021); hx urological (03/03/2022); hx urological (Bilateral, 03/03/2022); hx urological (Bilateral, 03/03/2022); hx urological (05/02/2022); hx urological (05/02/2022); hx urological (05/02/2022); hx urological (Bilateral, 05/02/2022); hx urological (05/02/2022); and hx urological.  PSocHx:  reports that she has never smoked. She has never used smokeless tobacco. She reports that she does not currently use alcohol. She reports that she does not use drugs. She is status post bilateral ureteral reimplantation on 5/2/2022. Her stents were removed on 5/24/2022. Initially her creatinine was improved on 5/24/2022 at 4.2. It was up to 5.76 on 6/27/2022. On 6/27 her creatinine was 5.76. It had increased to 5.83. Last week it was 5.23. She is followed by Dr. Siria Sahni. She has a NGB. Overactivity and high-pressure storage are concerning. We discontinued bethanechol on 5/24/22, continued tamsulosin and started tolterodine. She is not taking tolterodine. She finds her flow is strong instead of a trickle. Amitriptyline may or may not affect the bladder functionality. She is here today in follow up. Urinalysis with blood and leukocytes today. In a wheelchair. No dysuria or frequency. Chronic Conditions Addressed Today       1. Hydronephrosis - Primary     Overview      US done on 4/7/21 reads bilateral ureteral stents with moderate hydronephrosis despite the placement of stents ([id number]). 5/21/21: persistent hydronephrosis with CKD, MS and bladder diverticulum. 5/27/21 ureteral stent exchange. 6F double J stent bilaterally. 3/3/22: Cystoscopy, retrograde pyelograms, BILATERAL ureteral stent exchange. 4/27/22: Bilateral obstruction at the level of bladder insertion. 5/2/22: she is s/p cystoscopy, bilateral retrograde pyelogram , bilateral robotic ureteral reimplantation, BILATERAL ureteral stent placement, ureteral dilation on 5/2/22.    5/24/22: NGB with bilateral hydronephrosis and ureteral stents. She is s/p reimplantation bilaterally on 5/2/22. Stents removed. 2. Neurogenic bladder     Overview      She has a neurogenic bladder secondary to multiple sclerosis with previous history of urinary retention along with bilateral hydronephrosis treated with bilateral stent placement on 12/7/2020 by Dr. Darian aMlik with assistance from Dr. Fide Escobar. She continues to be managed with ureteral stents. Over activity and high-pressure storage is a concern. 4/26/22: Worsening renal function. Velazco catheter was placed. She may end up needing long-term catheterization. 5/2/22: she is s/p cystoscopy, bilateral retrograde pyelogram , bilateral robotic ureteral reimplantation, BILATERAL ureteral stent placement, ureteral dilation on 5/2/22. Velazco exchanged same date. 5/24/22: Overactivity and high-pressure storage are concerning. We will discontinue bethanechol. Continue on tamsulosin. Amitriptyline may or may not affect the bladder functionality.   We will start her on tolterodine. 3. CKD (chronic kidney disease)     Overview      CKD, followed by Dr. Ludy Dexter, Nephrology. 12/3/2020 Cr was 1.86  4/26/21 Cr was 3.8  4/26/22 Cr was 6.32    5/2/22: she is s/p cystoscopy, bilateral retrograde pyelogram , bilateral robotic ureteral reimplantation, BILATERAL ureteral stent placement, ureteral dilation on 5/2/22. 5/4/22: creatinine 5.42  5/24/22 creatinine 4.2  6/27/22 creatinine 5.76         4. Mixed stress and urge urinary incontinence     Overview      NGB secondary to MS. Manages with pads or external collection devices. ROS  Patient denies the symptoms of COVID-19 per routine screening guidelines. Physical Exam    ASSESSMENT and PLAN  Diagnoses and all orders for this visit:    1. Hydronephrosis with ureteral stricture, not elsewhere classified  -     AMB POC URINALYSIS DIP STICK AUTO W/O MICRO  -     CULTURE, URINE    2. Stage 5 chronic kidney disease not on chronic dialysis [facility])  Assessment & Plan:   Her kidney function was improved after reimplantation however I am not sure we can explain her renal dysfunction slowly from ureteral stricturing. It did get worse after stent removal.  There was a question of mild UPJ stricturing. We discussed further evaluation with cystoscopy and retrograde pyelograms. 3. Neurogenic bladder  -     AMB POC URINALYSIS DIP STICK AUTO W/O MICRO  -     CULTURE, URINE    4. Mixed stress and urge urinary incontinence  -     AMB POC URINALYSIS DIP STICK AUTO W/O MICRO  -     CULTURE, URINE    5. Ureteral stricture  Assessment & Plan:  S/p ureteral reimplantation. Concern for borderline UPJ stricturing. CKD. We discussed evaluation of her ureteral drainage. We will plan cystoscopy and retrograde pyelograms to assess her renal drainage. The patient was counseled on the risks, benefits and expected course of surgery. Surgical risk factors include concurrent diagnoses and PMH.  Surgery has risks of bleeding, infection, injury, pain, death or other consequences. Perioperative medications and antibiotic use were discussed including the potential for reactions and side effects. Some specific risks of surgery were discussed as well. She wishes to proceed             Follow-up and Dispositions    Return for Surgery to be scheduled. Sherice Haines may have a reminder for a \"due or due soon\" health maintenance. The patient has been encouraged to contact their primary care provider for follow-up on this health maintenance or other necessary and/or routine health screening.      Farshad Lemos MD

## 2022-08-23 ENCOUNTER — OFFICE VISIT (OUTPATIENT)
Dept: UROLOGY | Age: 62
End: 2022-08-23
Payer: MEDICARE

## 2022-08-23 VITALS
WEIGHT: 168 LBS | SYSTOLIC BLOOD PRESSURE: 118 MMHG | HEIGHT: 63 IN | HEART RATE: 71 BPM | TEMPERATURE: 97.8 F | BODY MASS INDEX: 29.77 KG/M2 | OXYGEN SATURATION: 100 % | DIASTOLIC BLOOD PRESSURE: 71 MMHG

## 2022-08-23 DIAGNOSIS — N18.5 STAGE 5 CHRONIC KIDNEY DISEASE NOT ON CHRONIC DIALYSIS (HCC): ICD-10-CM

## 2022-08-23 DIAGNOSIS — N13.1 HYDRONEPHROSIS WITH URETERAL STRICTURE, NOT ELSEWHERE CLASSIFIED: Primary | ICD-10-CM

## 2022-08-23 DIAGNOSIS — N13.5 URETERAL STRICTURE: ICD-10-CM

## 2022-08-23 DIAGNOSIS — N39.46 MIXED STRESS AND URGE URINARY INCONTINENCE: ICD-10-CM

## 2022-08-23 DIAGNOSIS — N31.9 NEUROGENIC BLADDER: ICD-10-CM

## 2022-08-23 LAB
BILIRUB UR QL: NEGATIVE
GLUCOSE UR-MCNC: NEGATIVE MG/DL
KETONES P FAST UR STRIP-MCNC: NEGATIVE MG/DL
PH UR STRIP: 7 [PH] (ref 4.6–8)
PROT UR QL STRIP: 30
SP GR UR STRIP: 1.01 (ref 1–1.03)
UA UROBILINOGEN AMB POC: NORMAL (ref 0.2–1)
URINALYSIS CLARITY POC: NORMAL
URINALYSIS COLOR POC: NORMAL
URINE BLOOD POC: NORMAL
URINE LEUKOCYTES POC: NORMAL
URINE NITRITES POC: NEGATIVE

## 2022-08-23 PROCEDURE — G8419 CALC BMI OUT NRM PARAM NOF/U: HCPCS | Performed by: UROLOGY

## 2022-08-23 PROCEDURE — G8432 DEP SCR NOT DOC, RNG: HCPCS | Performed by: UROLOGY

## 2022-08-23 PROCEDURE — G8427 DOCREV CUR MEDS BY ELIG CLIN: HCPCS | Performed by: UROLOGY

## 2022-08-23 PROCEDURE — 99214 OFFICE O/P EST MOD 30 MIN: CPT | Performed by: UROLOGY

## 2022-08-23 PROCEDURE — 81003 URINALYSIS AUTO W/O SCOPE: CPT | Performed by: UROLOGY

## 2022-08-23 NOTE — ASSESSMENT & PLAN NOTE
Her kidney function was improved after reimplantation however I am not sure we can explain her renal dysfunction slowly from ureteral stricturing. It did get worse after stent removal.  There was a question of mild UPJ stricturing. We discussed further evaluation with cystoscopy and retrograde pyelograms.

## 2022-08-23 NOTE — PROGRESS NOTES
Chief Complaint   Patient presents with    Follow-up    Hydronephrosis    Neurogenic Bladder    Bladder Infection       PHQ-9 score is    Negative    Vitals:    08/23/22 1508   BP: 118/71   Pulse: 71   Temp: 97.8 °F (36.6 °C)   TempSrc: Temporal   SpO2: 100%   Weight: 168 lb (76.2 kg)   Height: 5' 3\" (1.6 m)   PainSc:   0 - No pain             1. \"Have you been to the ER, urgent care clinic since your last visit? Hospitalized since your last visit? \" No    2. \"Have you seen or consulted any other health care providers outside of the [address] [place] [address] since your last visit? \" No     3. For patients aged 39-70: Has the patient had a colonoscopy / FIT/ Cologuard? No      If the patient is female:    4. For patients aged 41-77: Has the patient had a mammogram within the past 2 years? No      5. For patients aged 21-65: Has the patient had a pap smear?  No

## 2022-08-23 NOTE — ASSESSMENT & PLAN NOTE
S/p ureteral reimplantation. Concern for borderline UPJ stricturing. CKD. We discussed evaluation of her ureteral drainage. We will plan cystoscopy and retrograde pyelograms to assess her renal drainage. The patient was counseled on the risks, benefits and expected course of surgery. Surgical risk factors include concurrent diagnoses and PMH. Surgery has risks of bleeding, infection, injury, pain, death or other consequences. Perioperative medications and antibiotic use were discussed including the potential for reactions and side effects. Some specific risks of surgery were discussed as well.     She wishes to proceed

## 2022-08-25 LAB — BACTERIA UR CULT: NORMAL

## 2022-08-29 NOTE — PROGRESS NOTES
Patient completed phone assessment and gave back verbal understanding of pre opt education and instructions. Pt given pat number for any pre opt questions that may arise.

## 2022-09-06 ENCOUNTER — ANESTHESIA (OUTPATIENT)
Dept: SURGERY | Age: 62
End: 2022-09-06
Payer: MEDICARE

## 2022-09-06 ENCOUNTER — HOSPITAL ENCOUNTER (OUTPATIENT)
Age: 62
Discharge: HOME | End: 2022-09-06
Attending: UROLOGY | Admitting: UROLOGY
Payer: MEDICARE

## 2022-09-06 ENCOUNTER — APPOINTMENT (OUTPATIENT)
Dept: GENERAL RADIOLOGY | Age: 62
End: 2022-09-06
Attending: UROLOGY
Payer: MEDICARE

## 2022-09-06 ENCOUNTER — ANESTHESIA EVENT (OUTPATIENT)
Dept: SURGERY | Age: 62
End: 2022-09-06
Payer: MEDICARE

## 2022-09-06 VITALS
WEIGHT: 166 LBS | HEART RATE: 68 BPM | SYSTOLIC BLOOD PRESSURE: 148 MMHG | BODY MASS INDEX: 29.41 KG/M2 | OXYGEN SATURATION: 99 % | DIASTOLIC BLOOD PRESSURE: 84 MMHG | TEMPERATURE: 96.5 F | RESPIRATION RATE: 18 BRPM | HEIGHT: 63 IN

## 2022-09-06 PROBLEM — N13.1 HYDRONEPHROSIS WITH URETERAL STRICTURE: Status: ACTIVE | Noted: 2022-09-06

## 2022-09-06 LAB
ANION GAP BLD CALC-SCNC: 9 MMOL/L
ANION GAP SERPL CALC-SCNC: 7 MMOL/L (ref 5–15)
BUN SERPL-MCNC: 59 MG/DL (ref 6–20)
BUN/CREAT SERPL: 11 (ref 12–20)
CA-I BLD-MCNC: 0.93 MMOL/L (ref 1.12–1.32)
CA-I BLD-MCNC: 8.2 MG/DL (ref 8.5–10.1)
CHLORIDE BLD-SCNC: 116 MMOL/L (ref 98–107)
CHLORIDE SERPL-SCNC: 114 MMOL/L (ref 97–108)
CO2 BLD-SCNC: 18 MMOL/L
CO2 SERPL-SCNC: 22 MMOL/L (ref 21–32)
CREAT SERPL-MCNC: 5.33 MG/DL (ref 0.55–1.02)
CREAT UR-MCNC: 5.8 MG/DL (ref 0.6–1.3)
GLUCOSE BLD STRIP.AUTO-MCNC: 68 MG/DL (ref 65–100)
GLUCOSE SERPL-MCNC: 81 MG/DL (ref 65–100)
POTASSIUM BLD-SCNC: 5.8 MMOL/L (ref 3.5–5.5)
POTASSIUM SERPL-SCNC: 5.9 MMOL/L (ref 3.5–5.1)
POTASSIUM SERPL-SCNC: 6 MMOL/L (ref 3.5–5.1)
SODIUM BLD-SCNC: 141 MMOL/L (ref 136–145)
SODIUM SERPL-SCNC: 143 MMOL/L (ref 136–145)

## 2022-09-06 PROCEDURE — 74011000250 HC RX REV CODE- 250: Performed by: NURSE ANESTHETIST, CERTIFIED REGISTERED

## 2022-09-06 PROCEDURE — 74420 UROGRAPHY RTRGR +-KUB: CPT | Performed by: UROLOGY

## 2022-09-06 PROCEDURE — 76210000026 HC REC RM PH II 1 TO 1.5 HR: Performed by: UROLOGY

## 2022-09-06 PROCEDURE — 87086 URINE CULTURE/COLONY COUNT: CPT

## 2022-09-06 PROCEDURE — C1758 CATHETER, URETERAL: HCPCS | Performed by: UROLOGY

## 2022-09-06 PROCEDURE — 80047 BASIC METABLC PNL IONIZED CA: CPT

## 2022-09-06 PROCEDURE — 76060000032 HC ANESTHESIA 0.5 TO 1 HR: Performed by: UROLOGY

## 2022-09-06 PROCEDURE — 36415 COLL VENOUS BLD VENIPUNCTURE: CPT

## 2022-09-06 PROCEDURE — 2709999900 HC NON-CHARGEABLE SUPPLY: Performed by: UROLOGY

## 2022-09-06 PROCEDURE — 52332 CYSTOSCOPY AND TREATMENT: CPT | Performed by: UROLOGY

## 2022-09-06 PROCEDURE — C2617 STENT, NON-COR, TEM W/O DEL: HCPCS | Performed by: UROLOGY

## 2022-09-06 PROCEDURE — 76210000063 HC OR PH I REC FIRST 0.5 HR: Performed by: UROLOGY

## 2022-09-06 PROCEDURE — 76010000138 HC OR TIME 0.5 TO 1 HR: Performed by: UROLOGY

## 2022-09-06 PROCEDURE — 74011250636 HC RX REV CODE- 250/636: Performed by: UROLOGY

## 2022-09-06 PROCEDURE — 74011250636 HC RX REV CODE- 250/636: Performed by: NURSE ANESTHETIST, CERTIFIED REGISTERED

## 2022-09-06 PROCEDURE — 84132 ASSAY OF SERUM POTASSIUM: CPT

## 2022-09-06 PROCEDURE — 76000 FLUOROSCOPY <1 HR PHYS/QHP: CPT

## 2022-09-06 PROCEDURE — 74011000636 HC RX REV CODE- 636: Performed by: UROLOGY

## 2022-09-06 DEVICE — URETERAL STENT
Type: IMPLANTABLE DEVICE | Site: URETER | Status: FUNCTIONAL
Brand: CONTOUR™

## 2022-09-06 RX ORDER — SODIUM CHLORIDE 9 MG/ML
INJECTION, SOLUTION INTRAVENOUS
Status: DISCONTINUED | OUTPATIENT
Start: 2022-09-06 | End: 2022-09-06 | Stop reason: HOSPADM

## 2022-09-06 RX ORDER — SODIUM CHLORIDE 0.9 % (FLUSH) 0.9 %
5-40 SYRINGE (ML) INJECTION EVERY 8 HOURS
Status: DISCONTINUED | OUTPATIENT
Start: 2022-09-06 | End: 2022-09-06 | Stop reason: HOSPADM

## 2022-09-06 RX ORDER — PROPOFOL 10 MG/ML
INJECTION, EMULSION INTRAVENOUS AS NEEDED
Status: DISCONTINUED | OUTPATIENT
Start: 2022-09-06 | End: 2022-09-06 | Stop reason: HOSPADM

## 2022-09-06 RX ORDER — SODIUM CHLORIDE 0.9 % (FLUSH) 0.9 %
5-40 SYRINGE (ML) INJECTION AS NEEDED
Status: CANCELLED | OUTPATIENT
Start: 2022-09-06

## 2022-09-06 RX ORDER — FENTANYL CITRATE 50 UG/ML
25 INJECTION, SOLUTION INTRAMUSCULAR; INTRAVENOUS
Status: CANCELLED | OUTPATIENT
Start: 2022-09-06

## 2022-09-06 RX ORDER — SODIUM CHLORIDE 0.9 % (FLUSH) 0.9 %
5-40 SYRINGE (ML) INJECTION AS NEEDED
Status: DISCONTINUED | OUTPATIENT
Start: 2022-09-06 | End: 2022-09-06 | Stop reason: HOSPADM

## 2022-09-06 RX ORDER — LIDOCAINE HYDROCHLORIDE 20 MG/ML
INJECTION, SOLUTION EPIDURAL; INFILTRATION; INTRACAUDAL; PERINEURAL AS NEEDED
Status: DISCONTINUED | OUTPATIENT
Start: 2022-09-06 | End: 2022-09-06 | Stop reason: HOSPADM

## 2022-09-06 RX ORDER — DEXAMETHASONE SODIUM PHOSPHATE 4 MG/ML
INJECTION, SOLUTION INTRA-ARTICULAR; INTRALESIONAL; INTRAMUSCULAR; INTRAVENOUS; SOFT TISSUE AS NEEDED
Status: DISCONTINUED | OUTPATIENT
Start: 2022-09-06 | End: 2022-09-06 | Stop reason: HOSPADM

## 2022-09-06 RX ORDER — ONDANSETRON 2 MG/ML
4 INJECTION INTRAMUSCULAR; INTRAVENOUS AS NEEDED
Status: CANCELLED | OUTPATIENT
Start: 2022-09-06

## 2022-09-06 RX ORDER — SODIUM CHLORIDE 0.9 % (FLUSH) 0.9 %
5-40 SYRINGE (ML) INJECTION EVERY 8 HOURS
Status: CANCELLED | OUTPATIENT
Start: 2022-09-06

## 2022-09-06 RX ORDER — SODIUM CHLORIDE 9 MG/ML
25 INJECTION, SOLUTION INTRAVENOUS CONTINUOUS
Status: DISCONTINUED | OUTPATIENT
Start: 2022-09-06 | End: 2022-09-06 | Stop reason: HOSPADM

## 2022-09-06 RX ORDER — HYDROMORPHONE HYDROCHLORIDE 1 MG/ML
0.5 INJECTION, SOLUTION INTRAMUSCULAR; INTRAVENOUS; SUBCUTANEOUS
Status: CANCELLED | OUTPATIENT
Start: 2022-09-06

## 2022-09-06 RX ORDER — CEFAZOLIN SODIUM 1 G/3ML
INJECTION, POWDER, FOR SOLUTION INTRAMUSCULAR; INTRAVENOUS AS NEEDED
Status: DISCONTINUED | OUTPATIENT
Start: 2022-09-06 | End: 2022-09-06 | Stop reason: HOSPADM

## 2022-09-06 RX ORDER — FENTANYL CITRATE 50 UG/ML
INJECTION, SOLUTION INTRAMUSCULAR; INTRAVENOUS AS NEEDED
Status: DISCONTINUED | OUTPATIENT
Start: 2022-09-06 | End: 2022-09-06 | Stop reason: HOSPADM

## 2022-09-06 RX ADMIN — SODIUM CHLORIDE: 9 INJECTION, SOLUTION INTRAVENOUS at 14:01

## 2022-09-06 RX ADMIN — CEFAZOLIN SODIUM 2 G: 1 INJECTION, POWDER, FOR SOLUTION INTRAMUSCULAR; INTRAVENOUS at 14:15

## 2022-09-06 RX ADMIN — LIDOCAINE HYDROCHLORIDE 80 MG: 20 INJECTION, SOLUTION EPIDURAL; INFILTRATION; INTRACAUDAL; PERINEURAL at 14:07

## 2022-09-06 RX ADMIN — SODIUM CHLORIDE 25 ML/HR: 9 INJECTION, SOLUTION INTRAVENOUS at 11:25

## 2022-09-06 RX ADMIN — PROPOFOL 150 MG: 10 INJECTION, EMULSION INTRAVENOUS at 14:07

## 2022-09-06 RX ADMIN — DEXAMETHASONE SODIUM PHOSPHATE 4 MG: 4 INJECTION, SOLUTION INTRA-ARTICULAR; INTRALESIONAL; INTRAMUSCULAR; INTRAVENOUS; SOFT TISSUE at 14:07

## 2022-09-06 RX ADMIN — FENTANYL CITRATE 100 MCG: 50 INJECTION, SOLUTION INTRAMUSCULAR; INTRAVENOUS at 14:07

## 2022-09-06 NOTE — ANESTHESIA PREPROCEDURE EVALUATION
Relevant Problems   RENAL FAILURE   (+) CKD (chronic kidney disease)   (+) Hydronephrosis   (+) Hydronephrosis with ureteral stricture      HEMATOLOGY   (+) Anemia       Anesthetic History   No history of anesthetic complications            Review of Systems / Medical History  Patient summary reviewed and pertinent labs reviewed    Pulmonary  Within defined limits            Pertinent negatives: No smoker     Neuro/Psych         Neuromuscular disease    Comments: Multiple Sclerosis Cardiovascular  Within defined limits                  Comments: Normal sinus rhythm   Left axis deviation   Minimal voltage criteria for LVH, may be normal variant   GI/Hepatic/Renal         Renal disease: CRI       Endo/Other        Obesity and anemia     Other Findings            Past Medical History:   Diagnosis Date    Arthritis     Burning with urination     Chronic kidney disease     no dialysis    CKD (chronic kidney disease)     GERD (gastroesophageal reflux disease)     MS (multiple sclerosis) (HCC)     Nonneurogenic neurogenic bladder dysfunction     Osteoporosis     S/P ureteral stent placement        Past Surgical History:   Procedure Laterality Date    HX ANKLE FRACTURE TX Right     HX FEMUR FRACTURE TX      HX HYSTERECTOMY      HX OTHER SURGICAL      MS    HX UROLOGICAL  12/07/2020    Cystourethroscop    HX UROLOGICAL  12/07/2020     urethral dilation    HX UROLOGICAL  12/07/2020    bilateral retrograde pyelograms    HX UROLOGICAL Bilateral 12/07/2020    ureteral stent placements    HX UROLOGICAL  05/27/2021    cystoscopy     HX UROLOGICAL Bilateral 05/27/2021    retrograde pyelogram     HX UROLOGICAL Bilateral 05/27/2021    ureteral stent changes     HX UROLOGICAL  03/03/2022    Cystoscopy    HX UROLOGICAL Bilateral 03/03/2022    retrograde pyelograms    HX UROLOGICAL Bilateral 03/03/2022    ureteral stent exchange    HX UROLOGICAL  05/02/2022    cystoscopy     HX UROLOGICAL  05/02/2022 bilateral retrograde pyelogram    HX UROLOGICAL  05/02/2022    bilateral robotic ureteral reimplantation    HX UROLOGICAL Bilateral 05/02/2022    URETERAL STENT PLACEMENT     HX UROLOGICAL  05/02/2022    URETERAL DILATION     HX UROLOGICAL      cystoscopy stent removal     NM FEMUR/KNEE SURG UNLISTED Right     FX       Current Outpatient Medications   Medication Instructions    amitriptyline (ELAVIL) 25 mg, Oral, EVERY BEDTIME    bisacodyL (DULCOLAX) 5 mg, Oral, DAILY PRN    calcium carb/vit D3/minerals (Calcium Carbonate-Vit D3-Min) 600 mg (1,500 mg)-200 unit chew No dose, route, or frequency recorded.  cholecalciferol (VITAMIN D3) 500 Units, Oral, DAILY    Dalfampridine-ER 12 HR (AMPYRA) 10 mg tablet EVERY 12 HOURS    ergocalciferol (ERGOCALCIFEROL) 1,250 mcg (50,000 unit) capsule TAKE 1 CAPSULE BY MOUTH EVERY MONTH FOR 4 MONTHS    furosemide (LASIX) 40 mg, Oral, AS NEEDED    gabapentin (NEURONTIN) 300 mg, Oral, 3 TIMES DAILY, PT states BID    Gilenya 0.5 mg cap No dose, route, or frequency recorded.     senna (SENOKOT) 8.6 mg tablet 1 Tablet, Oral, DAILY    sodium bicarbonate 650 mg tablet Oral, 3 TIMES DAILY    tamsulosin (FLOMAX) 0.4 mg, Oral, DAILY    tiZANidine (ZANAFLEX) 2 mg tablet Oral, 3 TIMES DAILY    vit A/vit C/vit E/zinc/copper (PRESERVISION AREDS PO) Oral, 2 TIMES DAILY       Current Facility-Administered Medications   Medication Dose Route Frequency    0.9% sodium chloride infusion  25 mL/hr IntraVENous CONTINUOUS    ceFAZolin (ANCEF) 2 g in sterile water (preservative free) 20 mL IV syringe  2 g IntraVENous ONCE       Patient Vitals for the past 24 hrs:   Temp Pulse Resp BP SpO2   09/06/22 1115 36.6 °C (97.8 °F) 73 16 (!) 149/87 99 %       Lab Results   Component Value Date/Time    WBC 6.3 05/03/2022 06:14 AM    HGB 8.9 (L) 05/03/2022 06:14 AM    HCT 31.0 (L) 05/03/2022 06:14 AM    PLATELET 675 12/96/6385 06:14 AM    MCV 88.1 05/03/2022 06:14 AM     Lab Results   Component Value Date/Time    Sodium 141 06/27/2022 12:12 PM    Potassium 5.2 06/27/2022 12:12 PM    Chloride 103 06/27/2022 12:12 PM    CO2 21 06/27/2022 12:12 PM    Anion gap 7 05/04/2022 02:45 AM    Glucose 71 06/27/2022 12:12 PM    BUN 64 (H) 06/27/2022 12:12 PM    Creatinine 5.76 (H) 06/27/2022 12:12 PM    BUN/Creatinine ratio 11 (L) 06/27/2022 12:12 PM    GFR est AA 10 (L) 05/04/2022 02:45 AM    GFR est non-AA 8 (L) 05/04/2022 02:45 AM    Calcium 9.4 06/27/2022 12:12 PM     No results found for: APTT, PTP, INR, INREXT  Lab Results   Component Value Date/Time    Glucose 71 06/27/2022 12:12 PM    Glucose (POC) 84 05/02/2022 08:16 AM         Physical Exam    Airway  Mallampati: III    Neck ROM: normal range of motion        Cardiovascular    Rhythm: regular  Rate: normal         Dental    Dentition: Edentulous     Pulmonary  Breath sounds clear to auscultation               Abdominal         Other Findings            Anesthetic Plan    ASA: 3  Anesthesia type: general    Monitoring Plan: Continuous noninvasive hemodynamic monitoring      Induction: Intravenous  Anesthetic plan and risks discussed with: Patient

## 2022-09-06 NOTE — ANESTHESIA POSTPROCEDURE EVALUATION
Procedure(s):  CYSTOURETHROSCOPY/BILATERAL  RETROGRADES PYELOGRAM/BILATERAL URETERAL STENT PLACEMENT.     general    Anesthesia Post Evaluation      Multimodal analgesia: multimodal analgesia used between 6 hours prior to anesthesia start to PACU discharge  Patient location during evaluation: PACU  Patient participation: complete - patient participated  Level of consciousness: awake  Pain score: 0  Pain management: adequate  Airway patency: patent  Anesthetic complications: no  Cardiovascular status: acceptable  Respiratory status: acceptable  Hydration status: acceptable  Post anesthesia nausea and vomiting:  controlled  Final Post Anesthesia Temperature Assessment:  Normothermia (36.0-37.5 degrees C)      INITIAL Post-op Vital signs:   Vitals Value Taken Time   /77 09/06/22 1455   Temp     Pulse 66 09/06/22 1455   Resp 18 09/06/22 1455   SpO2 100 % 09/06/22 1446

## 2022-09-06 NOTE — PERIOP NOTES
Dr. Jose Ramachandran notified of rapid potassium 5.8. MD asked for a serum potassium be sent to lab.

## 2022-09-06 NOTE — PERIOP NOTES
Dr. Antwan Arriaza notified of serum potassium 5.9. Ok to proceed with surgery. OR staff notified as well.

## 2022-09-06 NOTE — H&P
UROLOGY HISTORY AND PHYSICAL  Sascha ROXY Carlson, [address] [facility] Fetch[facility] Office    Patient: Albertina Schaffer MRN: [id number]  SSN: [id number]    YOB: 1960  Age: 63 y.o. Sex: female          Date of Encounter:  September 6, 2022  ADMITTED: (Not on file) to Socorro Keenan MD by Jah Guaman MD for HYDRONEPHROSIS WITH URETERAL STRICTURE/STAGE 5 CHRONIC KIDNEY DISEASE  Chief Complaint:  ureteral stricture             History of Present Illness:  Patient is a 63 y.o. female admitted (Not on file) to the [facility] for [address] [place] 5 CHRONIC KIDNEY DISEASE. The patient is here for surgery. Concern for borderline UPJ stricturing. CKD. We discussed evaluation of her ureteral drainage. We will plan cystoscopy and retrograde pyelograms to assess her renal drainage. She is status post bilateral ureteral reimplantation on 5/2/2022. Her stents were removed on 5/24/2022. Initially her creatinine was improved on 5/24/2022 at 4.2. It was up to 5.76 on 6/27/2022. On 6/27 her creatinine was 5.76. It had increased to 5.83. Last week it was 5.23. She is followed by Dr. Aniceto Ricci. She has a NGB. Overactivity and high-pressure storage are concerning. We discontinued bethanechol on 5/24/22, continued tamsulosin and started tolterodine. She is not taking tolterodine. She finds her flow is strong instead of a trickle. Amitriptyline may or may not affect the bladder functionality. See the problem list.     Problem List  Date Reviewed: 8/23/2022            Codes Class Noted    Ureteral stricture ICD-10-CM: N13.5  ICD-9-CM: 593.3  8/23/2022    Overview Signed 8/23/2022  3:33 PM by Socorro Keenan MD     S/p bilateral ureteral reimplantation 5/2022 for distal stricturing. 3/2022 BRP with mild UPJ strictures. 5/2022 BRP without UPJ strictures or calyceal blunting.                  Pain in left leg ICD-10-CM: M79.605  ICD-9-CM: 729.5  4/8/2022 Anemia ICD-10-CM: D64.9  ICD-9-CM: 285.9  8/4/2021        Mixed stress and urge urinary incontinence ICD-10-CM: N39.46  ICD-9-CM: 788.33  5/21/2021    Overview Addendum 8/10/2022  1:24 PM by Brady Navarro NP     NGB secondary to MS. Manages with pads or external collection devices. Neurogenic bladder ICD-10-CM: N31.9  ICD-9-CM: 596.54  5/20/2021    Overview Addendum 8/10/2022  1:23 PM by Brady Navarro NP     She has a neurogenic bladder secondary to multiple sclerosis with previous history of urinary retention along with bilateral hydronephrosis treated with bilateral stent placement on 12/7/2020 by Dr. Mortimer Boer with assistance from Dr. Sascha Adkins. She continues to be managed with ureteral stents. Over activity and high-pressure storage is a concern. 4/26/22: Worsening renal function. Velazco catheter was placed. She may end up needing long-term catheterization. 5/2/22: she is s/p cystoscopy, bilateral retrograde pyelogram , bilateral robotic ureteral reimplantation, BILATERAL ureteral stent placement, ureteral dilation on 5/2/22. Velazco exchanged same date. 5/24/22: Overactivity and high-pressure storage are concerning. We will discontinue bethanechol. Continue on tamsulosin. Amitriptyline may or may not affect the bladder functionality. We will start her on tolterodine. CKD (chronic kidney disease) ICD-10-CM: N18.9  ICD-9-CM: 585.9  5/20/2021    Overview Addendum 8/10/2022  1:22 PM by Brady Navarro NP     CKD, followed by Dr. Yusuf Meza, Nephrology. 12/3/2020 Cr was 1.86  4/26/21 Cr was 3.8  4/26/22 Cr was 6.32    5/2/22: she is s/p cystoscopy, bilateral retrograde pyelogram , bilateral robotic ureteral reimplantation, BILATERAL ureteral stent placement, ureteral dilation on 5/2/22. 5/4/22: creatinine 5.42  5/24/22 creatinine 4.2  6/27/22 creatinine 5.76             Fracture ICD-10-CM: T14. 8XXA  ICD-9-CM: 829.0  3/22/2021        H/O: hysterectomy ICD-10-CM: Z90.710  ICD-9-CM: V88.01  3/22/2021        Macular degeneration, right eye ICD-10-CM: H35.30  ICD-9-CM: 362.50  3/22/2021        Shingles ICD-10-CM: B02.9  ICD-9-CM: 053.9  3/22/2021        Multiple sclerosis ([facility] [address] ICD-10-CM: G35  ICD-9-CM: 767  3/22/2021        Pain ICD-10-CM: R52  ICD-9-CM: 780.96  3/22/2021        Weakness ICD-10-CM: R53.1  ICD-9-CM: 780.79  3/22/2021        Hydronephrosis ICD-10-CM: N13.30  ICD-9-CM: 179  12/7/2020    Overview Addendum 8/10/2022  1:21 PM by Braden Moran NP     US done on 4/7/21 reads bilateral ureteral stents with moderate hydronephrosis despite the placement of stents ([id number]). 5/21/21: persistent hydronephrosis with CKD, MS and bladder diverticulum. 5/27/21 ureteral stent exchange. 6F double J stent bilaterally. 3/3/22: Cystoscopy, retrograde pyelograms, BILATERAL ureteral stent exchange. 4/27/22: Bilateral obstruction at the level of bladder insertion. 5/2/22: she is s/p cystoscopy, bilateral retrograde pyelogram , bilateral robotic ureteral reimplantation, BILATERAL ureteral stent placement, ureteral dilation on 5/2/22.    5/24/22: NGB with bilateral hydronephrosis and ureteral stents. She is s/p reimplantation bilaterally on 5/2/22. Stents removed. Past Medical History: Allergies   Allergen Reactions    Latex, Natural Rubber Itching     Per patient: \"just regular, no anaphylaxis\"      Betaseron [Interferon Beta-1b] Rash    Tysabri [Natalizumab] Hives      Prior to Admission medications    Medication Sig Start Date End Date Taking? Authorizing Provider   ergocalciferol (ERGOCALCIFEROL) 1,250 mcg (50,000 unit) capsule TAKE 1 CAPSULE BY MOUTH EVERY MONTH FOR 4 MONTHS 3/29/22  Yes Provider, Historical   furosemide (LASIX) 40 mg tablet Take 40 mg by mouth as needed.  3/29/22  Yes Provider, Historical   Gilenya 0.5 mg cap  7/16/21  Yes Provider, Historical   vit A/vit C/vit E/zinc/copper (PRESERVISION AREDS PO) Take  by mouth two (2) times a day. Yes Provider, Historical   bisacodyL (DULCOLAX) 5 mg EC tablet Take 5 mg by mouth daily as needed for Constipation. Yes Provider, Historical   cholecalciferol (VITAMIN D3) 25 mcg (1,000 unit) cap Take 500 Units by mouth daily. Yes Provider, Historical   sodium bicarbonate 650 mg tablet Take  by mouth three (3) times daily. Yes Provider, Historical   tamsulosin (FLOMAX) 0.4 mg capsule Take 1 Capsule by mouth daily. 9/1/21  Yes Deborah Woodward MD   amitriptyline (ELAVIL) 25 mg tablet Take 25 mg by mouth nightly. Yes Provider, Historical   senna (SENOKOT) 8.6 mg tablet Take 1 Tab by mouth daily. Yes Provider, Historical   calcium carb/vit D3/minerals (Calcium Carbonate-Vit D3-Min) 600 mg (1,500 mg)-200 unit chew  2/12/20  Yes Provider, Historical   gabapentin (NEURONTIN) 300 mg capsule Take 300 mg by mouth three (3) times daily. PT states BID   Yes Provider, Historical   tiZANidine (ZANAFLEX) 2 mg tablet Take  by mouth three (3) times daily. Yes Provider, Historical   Dalfampridine-ER 12 HR (AMPYRA) 10 mg tablet Take  by mouth every twelve (12) hours. Patient not taking: Reported on 8/29/2022    Provider, Historical      PMHx:  has a past medical history of Arthritis, Burning with urination, Chronic kidney disease, CKD (chronic kidney disease), GERD (gastroesophageal reflux disease), MS (multiple sclerosis) ([facility] [address] Nonneurogenic neurogenic bladder dysfunction, Osteoporosis, and S/P ureteral stent placement.    PSurgHx:  has a past surgical history that includes hx hysterectomy; hx femur fracture tx; hx ankle fracture tx (Right); pr femur/knee surg unlisted (Right); hx other surgical; hx urological (12/07/2020); hx urological (12/07/2020); hx urological (12/07/2020); hx urological (Bilateral, 12/07/2020); hx urological (05/27/2021); hx urological (Bilateral, 05/27/2021); hx urological (Bilateral, 05/27/2021); hx urological (03/03/2022); hx urological (Bilateral, 03/03/2022); hx urological (Bilateral, 03/03/2022); hx urological (05/02/2022); hx urological (05/02/2022); hx urological (05/02/2022); hx urological (Bilateral, 05/02/2022); hx urological (05/02/2022); and hx urological.  PSocHx:  reports that she has never smoked. She has never used smokeless tobacco. She reports that she does not currently use alcohol. She reports that she does not use drugs. FamHx:   Family History   Problem Relation Age of Onset    Hypertension Mother     Ovarian Cancer Mother     Cancer Mother     Hypertension Maternal Grandmother     Ovarian Cancer Maternal Grandmother     Cancer Maternal Grandmother     Hypertension Maternal Grandfather     Hypertension Paternal Grandmother     Hypertension Paternal Grandfather     Hypertension Father      ROS:  Admission ROS by Manoj Bauman MD from (Not on file) were reviewed with the patient and changes (other than per HPI) include: none. Physical Exam:            Vitals[de-identified]    No data recorded. Height 5' 3\" (1.6 m), weight 166 lb (75.3 kg). Estimated body mass index is 29.41 kg/m² as calculated from the following:    Height as of this encounter: 5' 3\" (1.6 m). Weight as of this encounter: 166 lb (75.3 kg). I&O's:    No intake/output data recorded. General Well developed, in NAD   Conjunctiva/Lids Normal without gross defects   Neck Supple without obvious, masses   Respiratory Effort Breathing easily, no audible wheezing, rhonchi, stridor   CV RRR   Abdomen / Flank Soft, non tender without guarding or rebound, without obvious masses, no CVA tenderness   Neurologic Grossly normal without focal deficits           Assessment/Plan:  MS, neurogenic bladder. Borderline UPJ strictures, CKD. S/p bilateral ureteral reimplantation. Evaluating for ureteral obstruction. The patient was counseled on the risks, benefits and expected course of surgery. Surgical risk factors include concurrent diagnoses and PMH.  Surgery has risks of bleeding, infection, injury, pain, death or other consequences. Perioperative medications and antibiotic use were discussed including the potential for reactions and side effects. Some specific risks of surgery were discussed as well. Will possibly replace ureteral stents if obstruction suspected.       Cystoscopy, retrograde pyelograms  BILATERAL  ureteral stent placement    Signed By: Jasen Wall MD  - September 6, 2022

## 2022-09-06 NOTE — OP NOTES
UROLOGY OPERATIVE NOTE    Patient: Talya Suggs MRN: [id number]  SSN: [id number]    YOB: 1960  Age: 63 y.o. Sex: female          Pre-operative Diagnosis: HYDRONEPHROSIS WITH URETERAL STRICTURE/STAGE 5 CHRONIC KIDNEY DISEASE  Post-operative Diagnosis: HYDRONEPHROSIS WITH URETERAL STRICTURE/STAGE 5 CHRONIC KIDNEY DISEASE  Procedure: Cystoscopy, bilateral retrograde pyelogram BILATERAL  ureteral stent placement    Surgeon: Radha Whitt MD  Assistant: none    Anesthesia:  General  Findings:     Interpretation of left retrograde pyelogram: No stones overlying the urinary tract. Probable mild vesicoureteral reflux. No evidence of stricture at the bladder hiatus. The ureter was not strictured distally. At the UPJ there was relative narrowing and mild stricturing. There was mild dilation of the renal pelvis and moderate dilation of the calyces. Interpretation of right retrograde pyelogram: No stones are seen overlying the urinary tract. Probable mild vesicoureteral reflux. The ureterovesical junction was widely patent. The distal ureter was patent. At the level of the UPJ there was a mild narrowing or stricture. The renal pelvis was mildly dilated and the calyces are moderately dilated. Estimated Blood Loss:  scant  Drains: JJ ureteral stent 7F x 20cm bilaterally  Specimens:   Urine culture  Complications: none           Procedure Details: The patient was seen in the pre-operative area. The risks, benefits, complications, alternative treatment options, and expected outcomes were again discussed with the patient. The possibilities of reaction to medication, pain, infection, bleeding, major cardiovascular event, death, damage to surrounding structures were specifically addressed. Informed consent was obtained. Upon arrival to the operative suite, the patient, procedure, and side were confirmed via a pre-operative \"time-out\". All were in agreement.  The patient was carefully positioned and anesthesia was undertaken. Sterile prep and drape was accomplished. The patient was in the lithotomy position. Using a 21 Welsh cystoscope with 30 and 70 degree lenses complete cystoscopy was performed. The urethra was unremarkable. The bladder mucosa was normal in appearance without tumors, lesions or trabeculation seen. The ureteral neorifices were seen on either side. They were widely patent and drained clear urine with some cellular sediment. A urine culture was obtained via the scope.  fluoroscopic imaging did not reveal obvious stones overlying the urinary tract. Using an open-ended catheter the left ureteral orifice was cannulated. Using Isovue a gentle retrograde pyelogram was performed. Even before injection, there appeared to be free vesicuoureteral reflux. The distal ureter appeared patent without stricturing. At the level of the UPJ there was relative narrowing. There appeared to be contrast into a mildly dilated renal pelvis and moderately dilated calyces. There appeared to be drainage out the kidney into the ureter however there was retained contrast in the calyces. Using an open-ended catheter the right ureteral orifice was cannulated. Using Isovue a gentle retrograde pyelogram was performed. Again there was apparent vesicoureteral reflux before I was able to put the catheter into the orifice. The distal ureter appeared patent without stricturing. At the level of the UPJ there was a relative narrowing. The renal pelvis was mildly dilated and the calyces were moderately dilated. After observation there appeared to be drainage of the ureter. Appear to be drainage out of the renal pelvis, however there was retained contrast in the calyces. Due to the mild bilateral UPJ strictures and chronic kidney disease I decided to place a ureteral stent bilaterally. A 0.035 guidewire was placed up the left ureter to the renal pelvis.   Over the wire a 7 [place] by 20 cm double-J stent was passed with a curl in the renal pelvis and in the bladder on fluoroscopy and direct visualization. Similarly a right ureteral stent was placed. The bladder was drained. The patient was transported in stable condition to recovery.      Grecia Casas MD

## 2022-09-06 NOTE — PROGRESS NOTES
Discharge instructions reviewed with Mimi brennan, verbalized understanding, IV out and wheeled out to vehicle.

## 2022-09-06 NOTE — ROUTINE PROCESS
Rapid  potassium  drawn  waiting  for  results   bedside  face  to  face   report  given  to Bushra Mcdaniels

## 2022-09-08 LAB
BACTERIA SPEC CULT: NORMAL
SPECIAL REQUESTS,SREQ: NORMAL

## 2022-09-16 ENCOUNTER — TELEPHONE (OUTPATIENT)
Dept: UROLOGY | Age: 62
End: 2022-09-16

## 2022-09-16 NOTE — TELEPHONE ENCOUNTER
[facility] called on behalf of patient, patient is requesting refill for tamsulosin and another medicine that sounded like Preagblin, I am unsure I could not quite understand it was muffled, also missed the phone number for call back due to that.      Please advise Alzheimer disease    Aspiration into airway    Asthma    Atrial fibrillation    Cardiac arrest    COPD (chronic obstructive pulmonary disease)    Depressive disorder    Dysphagia    Gastric ulcer    HLD (hyperlipidemia)    HTN (hypertension)    Hypothyroid    Hypoxia    Leukocytosis    Multiple falls    Pacemaker    PAF (paroxysmal atrial fibrillation)    Pulmonary fibrosis    SVT (supraventricular tachycardia)    Syncope    Tracheomalacia    Vitamin D deficiency

## 2022-09-19 NOTE — TELEPHONE ENCOUNTER
Spoke to patient, she just received 90days supply tamsulosin in the mail.  And the pregablin she gets refilled frm another pcp

## 2022-09-28 PROBLEM — N13.1 HYDRONEPHROSIS WITH URETERAL STRICTURE: Status: RESOLVED | Noted: 2022-09-06 | Resolved: 2022-09-28

## 2022-10-10 NOTE — PROGRESS NOTES
HISTORY OF PRESENT ILLNESS  Jolie Burnette is a 63 y.o. female. Chief Complaint   Patient presents with    Post OP Follow Up    Hydronephrosis    Other     URETERAL STRICTURE        Past Medical History:  PMHx (including negatives):  has a past medical history of Arthritis, Burning with urination, Chronic kidney disease, CKD (chronic kidney disease), GERD (gastroesophageal reflux disease), MS (multiple sclerosis) ([address] Nonneurogenic neurogenic bladder dysfunction, Osteoporosis, and S/P ureteral stent placement. PSurgHx:  has a past surgical history that includes hx hysterectomy; hx femur fracture tx; hx ankle fracture tx (Right); pr femur/knee surg unlisted (Right); hx other surgical; hx urological (12/07/2020); hx urological (12/07/2020); hx urological (Bilateral, 12/07/2020); hx urological (Bilateral, 12/07/2020); hx urological (05/27/2021); hx urological (Bilateral, 05/27/2021); hx urological (Bilateral, 05/27/2021); hx urological (03/03/2022); hx urological (Bilateral, 03/03/2022); hx urological (Bilateral, 03/03/2022); hx urological (05/02/2022); hx urological (Bilateral, 05/02/2022); hx urological (05/02/2022); hx urological (Bilateral, 05/02/2022); hx urological (05/02/2022); hx urological; hx urological (09/06/2022); hx urological (Bilateral, 09/06/2022); and hx urological (Bilateral, 09/06/2022). PSocHx:  reports that she has never smoked. She has never used smokeless tobacco. She reports that she does not currently use alcohol. She reports that she does not use drugs. She is status post bilateral ureteral reimplantation on 5/2/2022. Initially her creatinine was improved on 5/24/2022 at 4.2. It was up to 5.76 on 6/27/2022. On 6/27 her creatinine was 5.76. She is followed by Dr. Sundar Degroot. She states that she saw him within the last month and was stable. She has a NGB. Overactivity and high-pressure storage are concerning.   We discontinued bethanechol on 5/24/22, continued tamsulosin and started tolterodine. She is not taking tolterodine and continues amitriptyline. She finds her flow is strong instead of a trickle. She is happy with her urination currently     9/6/22: s/p cystoscopy, bilateral retrograde pyelogram BILATERAL, ureteral stent placement. Interpretation of left retrograde pyelogram: No stones overlying the urinary tract. Probable mild vesicoureteral reflux. No evidence of stricture at the bladder hiatus. The ureter was not strictured distally. At the UPJ there was relative narrowing and mild stricturing. There was mild dilation of the renal pelvis and moderate dilation of the calyces. Interpretation of right retrograde pyelogram: No stones are seen overlying the urinary tract. Probable mild vesicoureteral reflux. The ureterovesical junction was widely patent. The distal ureter was patent. At the level of the UPJ there was a mild narrowing or stricture. The renal pelvis was mildly dilated and the calyces are moderately dilated. No back pain currently    Chronic Conditions Addressed Today       1. Hydronephrosis     Overview      Stents placed 12/2020. persistent hydronephrosis with CKD, MS and bladder diverticulum. 3/3/22: Cystoscopy, retrograde pyelograms, BILATERAL ureteral stent exchange. 4/27/22: Bilateral obstruction at the level of bladder insertion. 5/2/22: she is s/p cystoscopy, bilateral retrograde pyelogram , bilateral robotic ureteral reimplantation, BILATERAL ureteral stent placement, ureteral dilation on 5/2/22. She is s/p reimplantation bilaterally on 5/2/22.     9/6/22: s/p cystoscopy, bilateral retrograde. Probable mild vesicoureteral reflux bilaterally. Left UPJ there was relative narrowing and mild stricturing. right retrograde pyelogram: mild vesicoureteral reflux. UPJ there was a mild narrowing or stricture.             Current Assessment & Plan       Mild bilateral UPJ obstruction          Relevant Orders     NM RENAL SCAN FLOW/FUNC W PHARM SNGL    2. Neurogenic bladder     Overview        Overactivity and high-pressure storage is a concern. Discontinued bethanechol. Continue on tamsulosin. Amitriptyline may or may not affect the bladder functionality. Prescribed tolterodine but not on this          Current Assessment & Plan       On tamsulosin, off bethanechol. On amitriptyline. Not on another bladder relaxant. She does use Lasix. 3. Stage 5 chronic kidney disease not on chronic dialysis [facility])     Overview      CKD, followed by Dr. Vilma Mendoza, Nephrology. 12/3/2020 Cr was 1.86  4/26/21 Cr was 3.8  4/26/22 Cr was 6.32    5/2/22: she is s/p cystoscopy, bilateral retrograde pyelogram , bilateral robotic ureteral reimplantation, BILATERAL ureteral stent placement, ureteral dilation on 5/2/22. 5/4/22: creatinine 5.42  5/24/22 creatinine 4.2  6/27/22 creatinine 5.76          Current Assessment & Plan      Reportedly stable, followed by Dr. Vilma Mendoza. The UPJ stricturing appears mild, not obviously contributing to her renal dysfunction. A baseline renal scan will help objectively measure her drainage. 4. Mixed stress and urge urinary incontinence     Overview      NGB secondary to MS. Manages with pads or external collection devices. Current Assessment & Plan       Neurogenic bladder secondary to MS         5. Ureteral stricture - Primary     Overview      3/2022 BRP with mild UPJ strictures. 5/2022 BRP without UPJ strictures or calyceal blunting. S/p bilateral ureteral reimplantation 5/2022 for distal stricturing.     8/23/22: concern for borderline stricturing. 9/6/22: s/p cystoscopy, bilateral retrograde pyelogram BILATERAL, ureteral stent placement. Interpretation of left retrograde pyelogram: No stones overlying the urinary tract. Probable mild vesicoureteral reflux. No evidence of stricture at the bladder hiatus. The ureter was not strictured distally.   At the UPJ there was relative narrowing and mild stricturing. There was mild dilation of the renal pelvis and moderate dilation of the calyces. Interpretation of right retrograde pyelogram: No stones are seen overlying the urinary tract. Probable mild vesicoureteral reflux. The ureterovesical junction was widely patent. The distal ureter was patent. At the level of the UPJ there was a mild narrowing or stricture. The renal pelvis was mildly dilated and the calyces are moderately dilated. Current Assessment & Plan       Mild bilateral UPJ stricturing. She has CKD stage 5. The UPJ narrowing does not appear to account for this. I will check a baseline renal scan with Lasix to assess her drainage. Relevant Orders     NM RENAL SCAN FLOW/FUNC W PHARM SNGL            Review of Systems   Neurological:  Positive for weakness (in a wheelchair). All other systems reviewed and are negative. Patient denies the symptoms of COVID-19 per routine screening guidelines. Physical Exam  Constitutional:       General: She is not in acute distress. Appearance: She is not ill-appearing. ASSESSMENT and PLAN  Diagnoses and all orders for this visit:    1. Ureteral stricture  Assessment & Plan:   Mild bilateral UPJ stricturing. She has CKD stage 5. The UPJ narrowing does not appear to account for this. I will check a baseline renal scan with Lasix to assess her drainage. Orders:  -     NM RENAL SCAN FLOW/FUNC W PHARM SNGL; Future    2. Hydronephrosis with ureteral stricture, not elsewhere classified  Assessment & Plan:   Mild bilateral UPJ obstruction    Orders:  -     NM RENAL SCAN FLOW/FUNC W PHARM SNGL; Future    3. Stage 5 chronic kidney disease not on chronic dialysis [facility])  Assessment & Plan:  Reportedly stable, followed by Dr. Roman Thomas. The UPJ stricturing appears mild, not obviously contributing to her renal dysfunction. A baseline renal scan will help objectively measure her drainage.       4. Neurogenic bladder  Assessment & Plan:   On tamsulosin, off bethanechol. On amitriptyline. Not on another bladder relaxant. She does use Lasix. 5. Mixed stress and urge urinary incontinence  Assessment & Plan:   Neurogenic bladder secondary to [address] may have a reminder for a \"due or due soon\" health maintenance. The patient has been encouraged to contact their primary care provider for follow-up on this health maintenance or other necessary and/or routine health screening.      Padmini Espinal MD

## 2022-10-11 ENCOUNTER — OFFICE VISIT (OUTPATIENT)
Dept: UROLOGY | Age: 62
End: 2022-10-11
Payer: MEDICARE

## 2022-10-11 VITALS
HEART RATE: 72 BPM | WEIGHT: 166 LBS | BODY MASS INDEX: 29.41 KG/M2 | DIASTOLIC BLOOD PRESSURE: 90 MMHG | HEIGHT: 63 IN | SYSTOLIC BLOOD PRESSURE: 163 MMHG

## 2022-10-11 DIAGNOSIS — N18.5 STAGE 5 CHRONIC KIDNEY DISEASE NOT ON CHRONIC DIALYSIS (HCC): ICD-10-CM

## 2022-10-11 DIAGNOSIS — N31.9 NEUROGENIC BLADDER: ICD-10-CM

## 2022-10-11 DIAGNOSIS — N39.46 MIXED STRESS AND URGE URINARY INCONTINENCE: ICD-10-CM

## 2022-10-11 DIAGNOSIS — N13.1 HYDRONEPHROSIS WITH URETERAL STRICTURE, NOT ELSEWHERE CLASSIFIED: ICD-10-CM

## 2022-10-11 DIAGNOSIS — N13.5 URETERAL STRICTURE: Primary | ICD-10-CM

## 2022-10-11 PROCEDURE — 3017F COLORECTAL CA SCREEN DOC REV: CPT | Performed by: UROLOGY

## 2022-10-11 PROCEDURE — G8419 CALC BMI OUT NRM PARAM NOF/U: HCPCS | Performed by: UROLOGY

## 2022-10-11 PROCEDURE — G8432 DEP SCR NOT DOC, RNG: HCPCS | Performed by: UROLOGY

## 2022-10-11 PROCEDURE — 99214 OFFICE O/P EST MOD 30 MIN: CPT | Performed by: UROLOGY

## 2022-10-11 PROCEDURE — G8427 DOCREV CUR MEDS BY ELIG CLIN: HCPCS | Performed by: UROLOGY

## 2022-10-11 RX ORDER — PREGABALIN 100 MG/1
CAPSULE ORAL 2 TIMES DAILY
COMMUNITY

## 2022-10-11 NOTE — PROGRESS NOTES
Chief Complaint   Patient presents with    Post OP Follow Up    Hydronephrosis    Other     URETERAL STRICTURE      1. Have you been to the ER, urgent care clinic since your last visit? Hospitalized since your last visit? No    2. Have you seen or consulted any other health care providers outside of the [address] [place] [address] since your last visit? Include any pap smears or colon screening.  No  Visit Vitals  BP (!) 163/90 (BP 1 Location: Right upper arm, BP Patient Position: Sitting, BP Cuff Size: Adult)   Pulse 72   Ht 5' 3\" (1.6 m)   Wt 166 lb (75.3 kg)   LMP  (LMP Unknown)   BMI 29.41 kg/m²

## 2022-10-11 NOTE — ASSESSMENT & PLAN NOTE
On tamsulosin, off bethanechol. On amitriptyline. Not on another bladder relaxant. She does use Lasix.

## 2022-10-11 NOTE — ASSESSMENT & PLAN NOTE
Reportedly stable, followed by Dr. Yimi Ambrocio. The UPJ stricturing appears mild, not obviously contributing to her renal dysfunction. A baseline renal scan will help objectively measure her drainage.

## 2022-10-11 NOTE — ASSESSMENT & PLAN NOTE
Mild bilateral UPJ stricturing. She has CKD stage 5. The UPJ narrowing does not appear to account for this. I will check a baseline renal scan with Lasix to assess her drainage.

## 2022-10-11 NOTE — LETTER
10/11/2022    Patient: Elise Guaman   YOB: 1960   Date of Visit: 10/11/2022     Scarlett Perla NP  [address] [facility]  [phone]  Via Fax: 364 MD Robert Yun 84  [address] [place] [address]  Via Fax: [phone]    Dear ЮЛИЯ Barnes MD,      Thank you for [facility]. My notes for this consultation are attached. If you have questions, please do not hesitate to call me. I look forward to following your patient along with you.       Sincerely,    Chris Orourke MD

## 2022-10-19 ENCOUNTER — HOSPITAL ENCOUNTER (OUTPATIENT)
Dept: NUCLEAR MEDICINE | Age: 62
Discharge: HOME | End: 2022-10-19
Attending: UROLOGY
Payer: MEDICARE

## 2022-10-19 DIAGNOSIS — N13.5 URETERAL STRICTURE: ICD-10-CM

## 2022-10-19 DIAGNOSIS — N13.1 HYDRONEPHROSIS WITH URETERAL STRICTURE, NOT ELSEWHERE CLASSIFIED: ICD-10-CM

## 2022-10-19 PROCEDURE — 78708 K FLOW/FUNCT IMAGE W/DRUG: CPT

## 2022-10-19 PROCEDURE — 74011250636 HC RX REV CODE- 250/636

## 2022-10-19 RX ORDER — BETIATIDE 1 MG/1
9.95 INJECTION, POWDER, LYOPHILIZED, FOR SOLUTION INTRAVENOUS
Status: COMPLETED | OUTPATIENT
Start: 2022-10-19 | End: 2022-10-19

## 2022-10-19 RX ORDER — FUROSEMIDE 10 MG/ML
INJECTION INTRAMUSCULAR; INTRAVENOUS
Status: COMPLETED
Start: 2022-10-19 | End: 2022-10-19

## 2022-10-19 RX ADMIN — FUROSEMIDE 37 MG: 10 INJECTION, SOLUTION INTRAMUSCULAR; INTRAVENOUS at 12:15

## 2022-10-19 RX ADMIN — BETIATIDE 9.95 MILLICURIE: 1 INJECTION, POWDER, LYOPHILIZED, FOR SOLUTION INTRAVENOUS at 11:45

## 2022-12-07 ENCOUNTER — TELEPHONE (OUTPATIENT)
Dept: UROLOGY | Age: 62
End: 2022-12-07

## 2022-12-07 NOTE — TELEPHONE ENCOUNTER
Patient wants to know if she can or needs a refill for AMITRIPTYLINE or if she needs to go on something else, it was prescribed by Eliud    Please advise,  She uses [facility] on [address].

## 2023-04-21 ENCOUNTER — OFFICE VISIT (OUTPATIENT)
Dept: UROLOGY | Age: 63
End: 2023-04-21

## 2023-04-21 VITALS
BODY MASS INDEX: 29.59 KG/M2 | SYSTOLIC BLOOD PRESSURE: 153 MMHG | WEIGHT: 167 LBS | HEIGHT: 63 IN | OXYGEN SATURATION: 100 % | TEMPERATURE: 97.8 F | DIASTOLIC BLOOD PRESSURE: 81 MMHG | HEART RATE: 60 BPM

## 2023-04-21 DIAGNOSIS — G35 MULTIPLE SCLEROSIS (HCC): ICD-10-CM

## 2023-04-21 DIAGNOSIS — N18.5 STAGE 5 CHRONIC KIDNEY DISEASE NOT ON CHRONIC DIALYSIS (HCC): ICD-10-CM

## 2023-04-21 DIAGNOSIS — N13.5 URETERAL STRICTURE: Primary | ICD-10-CM

## 2023-04-21 DIAGNOSIS — N31.9 NEUROGENIC BLADDER: ICD-10-CM

## 2023-04-21 DIAGNOSIS — Z96.0 URETERAL STENT PRESENT: ICD-10-CM

## 2023-04-21 DIAGNOSIS — N39.46 MIXED STRESS AND URGE URINARY INCONTINENCE: ICD-10-CM

## 2023-04-21 DIAGNOSIS — N13.1 HYDRONEPHROSIS WITH URETERAL STRICTURE, NOT ELSEWHERE CLASSIFIED: ICD-10-CM

## 2023-04-21 LAB
BILIRUB UR QL: NEGATIVE
GLUCOSE UR-MCNC: NEGATIVE MG/DL
KETONES P FAST UR STRIP-MCNC: NEGATIVE MG/DL
PH UR STRIP: 7 [PH] (ref 4.6–8)
PROT UR QL STRIP: 100
SP GR UR STRIP: 1.02 (ref 1–1.03)
UA UROBILINOGEN AMB POC: NORMAL (ref 0.2–1)
URINALYSIS CLARITY POC: NORMAL
URINALYSIS COLOR POC: YELLOW
URINE BLOOD POC: NORMAL
URINE LEUKOCYTES POC: NORMAL
URINE NITRITES POC: NEGATIVE

## 2023-04-21 NOTE — PROGRESS NOTES
Chief Complaint   Patient presents with    Follow-up    Urethral Stricture    Hydronephrosis    Neurogenic Bladder    Stress Incontinence     1. Have you been to the ER, urgent care clinic since your last visit? Hospitalized since your last visit? No    2. Have you seen or consulted any other health care providers outside of the [address] [place] [address] since your last visit? Include any pap smears or colon screening.  No  Visit Vitals  BP (!) 153/81 (BP 1 Location: Right upper arm, BP Patient Position: Sitting, BP Cuff Size: Adult)   Pulse 60   Temp 97.8 °F (36.6 °C) (Temporal)   Ht 5' 3\" (1.6 m)   Wt 167 lb (75.8 kg)   LMP  (LMP Unknown)   SpO2 100%   BMI 29.58 kg/m²

## 2023-04-21 NOTE — ASSESSMENT & PLAN NOTE
She has a left arm AVF but it needs revision per the patient. She is predialysis. 2/28/23 Cr 3.97, improved.

## 2023-04-21 NOTE — PROGRESS NOTES
HISTORY OF PRESENT ILLNESS  Werner Benjamin is a 63 y.o. female. has a past medical history of Arthritis, Burning with urination, Chronic kidney disease, CKD (chronic kidney disease), GERD (gastroesophageal reflux disease), MS (multiple sclerosis) ([facility] [address] Nonneurogenic neurogenic bladder dysfunction, Osteoporosis, and S/P ureteral stent placement. has a past surgical history that includes hx hysterectomy; hx femur fracture tx; hx ankle fracture tx (Right); pr unlisted procedure femur/knee (Right); hx other surgical; hx urological (12/07/2020); hx urological (12/07/2020); hx urological (Bilateral, 12/07/2020); hx urological (Bilateral, 12/07/2020); hx urological (05/27/2021); hx urological (Bilateral, 05/27/2021); hx urological (Bilateral, 05/27/2021); hx urological (03/03/2022); hx urological (Bilateral, 03/03/2022); hx urological (Bilateral, 03/03/2022); hx urological (05/02/2022); hx urological (Bilateral, 05/02/2022); hx urological (05/02/2022); hx urological (Bilateral, 05/02/2022); hx urological (05/02/2022); hx urological; hx urological (09/06/2022); hx urological (Bilateral, 09/06/2022); and hx urological (Bilateral, 09/06/2022). Chief Complaint   Patient presents with    Follow-up    Urethral Stricture    Hydronephrosis    Neurogenic Bladder    Stress Incontinence     She denies urinary difficulty. She is on tamsulosin and prn lasix. She rarely takes the lasix. She stopped amitryptyline. She does not recall efficacy or side effects. She is status post bilateral ureteral reimplantation on 5/2/2022. Initially her creatinine was improved on 5/24/2022 at 4.2. It was up to 5.76 on 6/27/2022. She has a NGB. Overactivity and high-pressure storage are concerning. We discontinued bethanechol on 5/24/22, continued tamsulosin and started tolterodine. She is not taking tolterodine and stopped amitriptyline. She finds her flow is strong instead of a trickle.   She is happy with her urination currently     9/6/22: s/p cystoscopy, bilateral retrograde pyelogram BILATERAL, ureteral stent placement. She had mild bilateral UPJ strictures. She remains off dialysis. Chronic Conditions Addressed Today       1. Hydronephrosis     Overview      She is s/p reimplantation bilaterally on 5/2/22. 2. Multiple sclerosis ([address]     Current Assessment & Plan      Stable. In a wheelchair           3. Neurogenic bladder     Overview      Overactivity and high-pressure storage is a concern. On tamsulosin. On amitriptyline. Relevant Orders     AMB POC URINALYSIS DIP STICK AUTO W/O MICRO     URINALYSIS W/MICROSCOPIC     CULTURE, URINE    4. Ureteral stent present     Overview      12/7/2020 bilateral ureteral stent placement by Dr. Alecia Carrasquillo with assistance from Dr. Vitaliy Avina. 5/27/21: stent exchange. Treated with nitrofurantoin on 5/27/21.    3/3/22: Cystoscopy, retrograde pyelograms, BILATERAL ureteral stent exchange. 5/2/22: she is s/p cystoscopy, bilateral retrograde pyelogram , bilateral robotic ureteral reimplantation, BILATERAL ureteral stent placement, ureteral dilation on 5/2/22.    5/24/22: removed. Replaced for mild UPJ strictures 9/2022. Current Assessment & Plan       She has bilateral ureteral stents for mild UPJ stricturing and CKD5. She remains off dialysis. Plan on ureteral stent changes. The patient was counseled on the risks, benefits and expected course of the procedure. The patient wished to proceed. Relevant Orders     AMB POC URINALYSIS DIP STICK AUTO W/O MICRO     URINALYSIS W/MICROSCOPIC     CULTURE, URINE    5. Stage 5 chronic kidney disease not on chronic dialysis [facility])     Overview      CKD, followed by Dr. Tash Escobar, Nephrology.   12/3/2020 Cr was 1.86  4/26/21 Cr was 3.8  4/26/22 Cr was 6.32    5/2/22: she is s/p cystoscopy, bilateral retrograde pyelogram , bilateral robotic ureteral reimplantation, BILATERAL ureteral stent placement, ureteral dilation on 5/2/22. 5/4/22: creatinine 5.42  5/24/22 creatinine 4.2  6/27/22 creatinine 5.76  9/6/22: creatinine 5.33          Current Assessment & Plan      She has a left arm AVF but it needs revision per the patient. She is predialysis. 2/28/23 Cr 3.97, improved. Relevant Orders     AMB POC URINALYSIS DIP STICK AUTO W/O MICRO     URINALYSIS W/MICROSCOPIC    6. Mixed stress and urge urinary incontinence     Overview      NGB secondary to MS. Manages with pads or external collection devices. Current Assessment & Plan      Stable pad usage, 1-2 per day. She can live with that. 7. Ureteral stricture - Primary     Overview      Mild bilateral UPJ stricturing with CKD. 10/19/22 renal scan with tracer retention in both renal collecting systems on delayed imaging, right greater than left. Current Assessment & Plan                   Past Medical History:    PMHx (including negatives):  has a past medical history of Arthritis, Burning with urination, Chronic kidney disease, CKD (chronic kidney disease), GERD (gastroesophageal reflux disease), MS (multiple sclerosis) ([facility] [address] Nonneurogenic neurogenic bladder dysfunction, Osteoporosis, and S/P ureteral stent placement.    PSurgHx:  has a past surgical history that includes hx hysterectomy; hx femur fracture tx; hx ankle fracture tx (Right); pr unlisted procedure femur/knee (Right); hx other surgical; hx urological (12/07/2020); hx urological (12/07/2020); hx urological (Bilateral, 12/07/2020); hx urological (Bilateral, 12/07/2020); hx urological (05/27/2021); hx urological (Bilateral, 05/27/2021); hx urological (Bilateral, 05/27/2021); hx urological (03/03/2022); hx urological (Bilateral, 03/03/2022); hx urological (Bilateral, 03/03/2022); hx urological (05/02/2022); hx urological (Bilateral, 05/02/2022); hx urological (05/02/2022); hx urological (Bilateral, 05/02/2022); hx urological (05/02/2022); hx urological; hx urological (09/06/2022); hx urological (Bilateral, 09/06/2022); and hx urological (Bilateral, 09/06/2022). PSocHx:  reports that she has never smoked. She has never used smokeless tobacco. She reports that she does not currently use alcohol. She reports that she does not use drugs. FamilyHX:   Family History   Problem Relation Age of Onset    Hypertension Mother     Ovarian Cancer Mother     Cancer Mother     Hypertension Maternal Grandmother     Ovarian Cancer Maternal Grandmother     Cancer Maternal Grandmother     Hypertension Maternal Grandfather     Hypertension Paternal Grandmother     Hypertension Paternal Grandfather     Hypertension Father      ROS  Physical Exam  Allergies   Allergen Reactions    Latex, Natural Rubber Itching     Per patient: \"just regular, no anaphylaxis\"      Betaseron [Interferon Beta-1b] Rash    Tysabri [Natalizumab] Hives     Current Outpatient Medications   Medication Sig Dispense Refill    pregabalin (LYRICA) 100 mg capsule Take  by mouth two (2) times a day. ergocalciferol (ERGOCALCIFEROL) 1,250 mcg (50,000 unit) capsule TAKE 1 CAPSULE BY MOUTH EVERY MONTH FOR 4 MONTHS      furosemide (LASIX) 40 mg tablet Take 1 Tablet by mouth as needed. Gilenya 0.5 mg cap       vit A/vit C/vit E/zinc/copper (PRESERVISION AREDS PO) Take  by mouth two (2) times a day. bisacodyL (DULCOLAX) 5 mg EC tablet Take 1 Tablet by mouth daily as needed for Constipation. cholecalciferol (VITAMIN D3) 25 mcg (1,000 unit) cap Take 500 Units by mouth daily. sodium bicarbonate 650 mg tablet Take  by mouth three (3) times daily. tamsulosin (FLOMAX) 0.4 mg capsule Take 1 Capsule by mouth daily. 90 Capsule 3    senna (SENOKOT) 8.6 mg tablet Take 1 Tablet by mouth daily. tiZANidine (ZANAFLEX) 2 mg tablet Take  by mouth three (3) times daily.         Results for orders placed or performed during the hospital encounter of 09/06/22   CULTURE, URINE    Specimen: Urine Result Value Ref Range    Special Requests: No Special Requests      Culture result: No Growth (<1000 cfu/mL)     POTASSIUM   Result Value Ref Range    Potassium 5.9 (H) 3.5 - 5.1 mmol/L   METABOLIC PANEL, BASIC   Result Value Ref Range    Sodium 143 136 - 145 mmol/L    Potassium 6.0 (H) 3.5 - 5.1 mmol/L    Chloride 114 (H) 97 - 108 mmol/L    CO2 22 21 - 32 mmol/L    Anion gap 7 5 - 15 mmol/L    Glucose 81 65 - 100 mg/dL    BUN 59 (H) 6 - 20 mg/dL    Creatinine 5.33 (H) 0.55 - 1.02 mg/dL    BUN/Creatinine ratio 11 (L) 12 - 20      GFR est AA 10 (L) >60 ml/min/1.73m2    GFR est non-AA 8 (L) >60 ml/min/1.73m2    Calcium 8.2 (L) 8.5 - 10.1 mg/dL   POC CHEM8   Result Value Ref Range    CO2, POC 18 MMOL/L    Glucose, POC 68 65 - 100 mg/dL    Creatinine, POC 5.8 MG/DL    Sodium,  136 - 145 mmol/L    Potassium, POC 5.8 (H) 3.5 - 5.5 mmol/L    Calcium, ionized (POC) 0.93 (L) 1.12 - 1.32 mmol/L    Chloride,  (H) 98 - 107 MMOL/L    Anion gap, POC 9         ASSESSMENT and PLAN  Diagnoses and all orders for this visit:    1. Ureteral stricture  Assessment & Plan:         2. Hydronephrosis with ureteral stricture, not elsewhere classified    3. Stage 5 chronic kidney disease not on chronic dialysis [facility])  Assessment & Plan:  She has a left arm AVF but it needs revision per the patient. She is predialysis. 2/28/23 Cr 3.97, improved. Orders:  -     AMB POC URINALYSIS DIP STICK AUTO W/O MICRO  -     URINALYSIS W/MICROSCOPIC    4. Neurogenic bladder  -     AMB POC URINALYSIS DIP STICK AUTO W/O MICRO  -     URINALYSIS W/MICROSCOPIC  -     CULTURE, URINE    5. Mixed stress and urge urinary incontinence  Assessment & Plan:  Stable pad usage, 1-2 per day. She can live with that. 6. Ureteral stent present  Assessment & Plan:   She has bilateral ureteral stents for mild UPJ stricturing and CKD5. She remains off dialysis. Plan on ureteral stent changes.      The patient was counseled on the risks, benefits and expected course of the procedure. The patient wished to proceed. Orders:  -     AMB POC URINALYSIS DIP STICK AUTO W/O MICRO  -     URINALYSIS W/MICROSCOPIC  -     CULTURE, URINE    7. Multiple sclerosis ([address]  Assessment & Plan:  Stable. In a wheelchair           Follow-up and Dispositions    Return for Surgery to be scheduled. Aleyda Jones MD       Please note that portions of this note was potentially completed with Dragon dictation, the computer voice recognition software. Quite often unanticipated grammatical, syntax, homophones, and other interpretive errors are inadvertently transcribed by the computer software. Please disregard these errors. Please excuse any errors that have escaped final proofreading. Thank you.

## 2023-04-21 NOTE — ASSESSMENT & PLAN NOTE
She has bilateral ureteral stents for mild UPJ stricturing and CKD5. She remains off dialysis. Plan on ureteral stent changes. The patient was counseled on the risks, benefits and expected course of the procedure. The patient wished to proceed.

## 2023-04-24 LAB
APPEARANCE UR: ABNORMAL
BACTERIA #/AREA URNS HPF: ABNORMAL /[HPF]
BACTERIA UR CULT: NORMAL
BILIRUB UR QL STRIP: NEGATIVE
CASTS URNS QL MICRO: ABNORMAL /LPF
COLOR UR: YELLOW
EPI CELLS #/AREA URNS HPF: ABNORMAL /HPF (ref 0–10)
GLUCOSE UR QL STRIP: NEGATIVE
HGB UR QL STRIP: ABNORMAL
KETONES UR QL STRIP: NEGATIVE
LEUKOCYTE ESTERASE UR QL STRIP: ABNORMAL
MICRO URNS: ABNORMAL
NITRITE UR QL STRIP: NEGATIVE
PH UR STRIP: 7.5 [PH] (ref 5–7.5)
PROT UR QL STRIP: ABNORMAL
RBC #/AREA URNS HPF: >30 /HPF (ref 0–2)
SP GR UR STRIP: 1.01 (ref 1–1.03)
UROBILINOGEN UR STRIP-MCNC: 0.2 MG/DL (ref 0.2–1)
WBC #/AREA URNS HPF: >30 /HPF (ref 0–5)

## 2023-05-02 RX ORDER — MAGNESIUM 250 MG
TABLET ORAL
COMMUNITY

## 2023-05-04 ENCOUNTER — ANESTHESIA (OUTPATIENT)
Dept: SURGERY | Age: 63
End: 2023-05-04
Payer: MEDICARE

## 2023-05-04 ENCOUNTER — HOSPITAL ENCOUNTER (OUTPATIENT)
Age: 63
Discharge: HOME | End: 2023-05-04
Attending: UROLOGY | Admitting: UROLOGY
Payer: MEDICARE

## 2023-05-04 ENCOUNTER — APPOINTMENT (OUTPATIENT)
Dept: GENERAL RADIOLOGY | Age: 63
End: 2023-05-04
Attending: UROLOGY
Payer: MEDICARE

## 2023-05-04 ENCOUNTER — ANESTHESIA EVENT (OUTPATIENT)
Dept: SURGERY | Age: 63
End: 2023-05-04
Payer: MEDICARE

## 2023-05-04 VITALS
SYSTOLIC BLOOD PRESSURE: 121 MMHG | RESPIRATION RATE: 16 BRPM | OXYGEN SATURATION: 97 % | TEMPERATURE: 97.5 F | HEIGHT: 63 IN | DIASTOLIC BLOOD PRESSURE: 71 MMHG | WEIGHT: 166 LBS | BODY MASS INDEX: 29.41 KG/M2 | HEART RATE: 81 BPM

## 2023-05-04 PROBLEM — N13.1 HYDRONEPHROSIS WITH URETERAL STRICTURE: Status: ACTIVE | Noted: 2023-05-04

## 2023-05-04 PROCEDURE — 2709999900 HC NON-CHARGEABLE SUPPLY: Performed by: UROLOGY

## 2023-05-04 PROCEDURE — 52332 CYSTOSCOPY AND TREATMENT: CPT | Performed by: UROLOGY

## 2023-05-04 PROCEDURE — 76210000006 HC OR PH I REC 0.5 TO 1 HR: Performed by: UROLOGY

## 2023-05-04 PROCEDURE — 74011000250 HC RX REV CODE- 250: Performed by: UROLOGY

## 2023-05-04 PROCEDURE — 74011000636 HC RX REV CODE- 636: Performed by: UROLOGY

## 2023-05-04 PROCEDURE — 74011000250 HC RX REV CODE- 250: Performed by: NURSE ANESTHETIST, CERTIFIED REGISTERED

## 2023-05-04 PROCEDURE — 74011250636 HC RX REV CODE- 250/636: Performed by: NURSE ANESTHETIST, CERTIFIED REGISTERED

## 2023-05-04 PROCEDURE — 76010000138 HC OR TIME 0.5 TO 1 HR: Performed by: UROLOGY

## 2023-05-04 PROCEDURE — 74420 UROGRAPHY RTRGR +-KUB: CPT | Performed by: UROLOGY

## 2023-05-04 PROCEDURE — 76060000032 HC ANESTHESIA 0.5 TO 1 HR: Performed by: UROLOGY

## 2023-05-04 PROCEDURE — 76000 FLUOROSCOPY <1 HR PHYS/QHP: CPT

## 2023-05-04 PROCEDURE — 76210000026 HC REC RM PH II 1 TO 1.5 HR: Performed by: UROLOGY

## 2023-05-04 PROCEDURE — C1769 GUIDE WIRE: HCPCS | Performed by: UROLOGY

## 2023-05-04 PROCEDURE — 74011250636 HC RX REV CODE- 250/636: Performed by: UROLOGY

## 2023-05-04 PROCEDURE — C2617 STENT, NON-COR, TEM W/O DEL: HCPCS | Performed by: UROLOGY

## 2023-05-04 PROCEDURE — C1758 CATHETER, URETERAL: HCPCS | Performed by: UROLOGY

## 2023-05-04 DEVICE — URETERAL STENT
Type: IMPLANTABLE DEVICE | Site: URETER | Status: FUNCTIONAL
Brand: CONTOUR™

## 2023-05-04 RX ORDER — LIDOCAINE HYDROCHLORIDE 10 MG/ML
0.1 INJECTION, SOLUTION EPIDURAL; INFILTRATION; INTRACAUDAL; PERINEURAL AS NEEDED
Status: CANCELLED | OUTPATIENT
Start: 2023-05-04

## 2023-05-04 RX ORDER — ONDANSETRON 2 MG/ML
INJECTION INTRAMUSCULAR; INTRAVENOUS AS NEEDED
Status: DISCONTINUED | OUTPATIENT
Start: 2023-05-04 | End: 2023-05-04 | Stop reason: HOSPADM

## 2023-05-04 RX ORDER — DIPHENHYDRAMINE HYDROCHLORIDE 50 MG/ML
12.5 INJECTION, SOLUTION INTRAMUSCULAR; INTRAVENOUS AS NEEDED
Status: DISCONTINUED | OUTPATIENT
Start: 2023-05-04 | End: 2023-05-04 | Stop reason: HOSPADM

## 2023-05-04 RX ORDER — LIDOCAINE HYDROCHLORIDE 20 MG/ML
INJECTION, SOLUTION EPIDURAL; INFILTRATION; INTRACAUDAL; PERINEURAL AS NEEDED
Status: DISCONTINUED | OUTPATIENT
Start: 2023-05-04 | End: 2023-05-04 | Stop reason: HOSPADM

## 2023-05-04 RX ORDER — PROPOFOL 10 MG/ML
INJECTION, EMULSION INTRAVENOUS AS NEEDED
Status: DISCONTINUED | OUTPATIENT
Start: 2023-05-04 | End: 2023-05-04 | Stop reason: HOSPADM

## 2023-05-04 RX ORDER — FENTANYL CITRATE 50 UG/ML
50 INJECTION, SOLUTION INTRAMUSCULAR; INTRAVENOUS
Status: DISCONTINUED | OUTPATIENT
Start: 2023-05-04 | End: 2023-05-04 | Stop reason: HOSPADM

## 2023-05-04 RX ORDER — NORETHINDRONE AND ETHINYL ESTRADIOL 0.5-0.035
5 KIT ORAL AS NEEDED
Status: DISCONTINUED | OUTPATIENT
Start: 2023-05-04 | End: 2023-05-04 | Stop reason: HOSPADM

## 2023-05-04 RX ORDER — SODIUM CHLORIDE 9 MG/ML
20 INJECTION, SOLUTION INTRAVENOUS CONTINUOUS
Status: DISCONTINUED | OUTPATIENT
Start: 2023-05-04 | End: 2023-05-04 | Stop reason: HOSPADM

## 2023-05-04 RX ORDER — HYDROMORPHONE HYDROCHLORIDE 1 MG/ML
0.5 INJECTION, SOLUTION INTRAMUSCULAR; INTRAVENOUS; SUBCUTANEOUS
Status: DISCONTINUED | OUTPATIENT
Start: 2023-05-04 | End: 2023-05-04 | Stop reason: HOSPADM

## 2023-05-04 RX ORDER — DEXAMETHASONE SODIUM PHOSPHATE 4 MG/ML
INJECTION, SOLUTION INTRA-ARTICULAR; INTRALESIONAL; INTRAMUSCULAR; INTRAVENOUS; SOFT TISSUE AS NEEDED
Status: DISCONTINUED | OUTPATIENT
Start: 2023-05-04 | End: 2023-05-04 | Stop reason: HOSPADM

## 2023-05-04 RX ORDER — SODIUM CHLORIDE 0.9 % (FLUSH) 0.9 %
5-40 SYRINGE (ML) INJECTION AS NEEDED
Status: CANCELLED | OUTPATIENT
Start: 2023-05-04

## 2023-05-04 RX ORDER — FENTANYL CITRATE 50 UG/ML
INJECTION, SOLUTION INTRAMUSCULAR; INTRAVENOUS AS NEEDED
Status: DISCONTINUED | OUTPATIENT
Start: 2023-05-04 | End: 2023-05-04 | Stop reason: HOSPADM

## 2023-05-04 RX ORDER — SODIUM CHLORIDE 0.9 % (FLUSH) 0.9 %
5-40 SYRINGE (ML) INJECTION EVERY 8 HOURS
Status: CANCELLED | OUTPATIENT
Start: 2023-05-04

## 2023-05-04 RX ORDER — ONDANSETRON 2 MG/ML
4 INJECTION INTRAMUSCULAR; INTRAVENOUS AS NEEDED
Status: DISCONTINUED | OUTPATIENT
Start: 2023-05-04 | End: 2023-05-04 | Stop reason: HOSPADM

## 2023-05-04 RX ORDER — SODIUM CHLORIDE 0.9 % (FLUSH) 0.9 %
5-40 SYRINGE (ML) INJECTION AS NEEDED
Status: DISCONTINUED | OUTPATIENT
Start: 2023-05-04 | End: 2023-05-04 | Stop reason: HOSPADM

## 2023-05-04 RX ORDER — SODIUM CHLORIDE 0.9 % (FLUSH) 0.9 %
5-40 SYRINGE (ML) INJECTION EVERY 8 HOURS
Status: DISCONTINUED | OUTPATIENT
Start: 2023-05-04 | End: 2023-05-04 | Stop reason: HOSPADM

## 2023-05-04 RX ADMIN — CEFAZOLIN SODIUM 2 G: 1 INJECTION, POWDER, FOR SOLUTION INTRAMUSCULAR; INTRAVENOUS at 10:36

## 2023-05-04 RX ADMIN — SODIUM CHLORIDE: 9 INJECTION, SOLUTION INTRAVENOUS at 10:22

## 2023-05-04 RX ADMIN — PROPOFOL 150 MG: 10 INJECTION, EMULSION INTRAVENOUS at 10:31

## 2023-05-04 RX ADMIN — DEXAMETHASONE SODIUM PHOSPHATE 4 MG: 4 INJECTION, SOLUTION INTRA-ARTICULAR; INTRALESIONAL; INTRAMUSCULAR; INTRAVENOUS; SOFT TISSUE at 10:46

## 2023-05-04 RX ADMIN — LIDOCAINE HYDROCHLORIDE 60 MG: 20 INJECTION, SOLUTION EPIDURAL; INFILTRATION; INTRACAUDAL; PERINEURAL at 10:31

## 2023-05-04 RX ADMIN — ONDANSETRON 4 MG: 2 INJECTION INTRAMUSCULAR; INTRAVENOUS at 10:46

## 2023-05-04 RX ADMIN — FENTANYL CITRATE 25 MCG: 0.05 INJECTION, SOLUTION INTRAMUSCULAR; INTRAVENOUS at 10:35

## 2023-05-04 RX ADMIN — FENTANYL CITRATE 25 MCG: 0.05 INJECTION, SOLUTION INTRAMUSCULAR; INTRAVENOUS at 10:31

## 2023-05-16 PROBLEM — Z96.0 URETERAL STENT PRESENT: Status: ACTIVE | Noted: 2021-05-20

## 2023-05-16 PROBLEM — N13.5 URETERAL STRICTURE: Status: ACTIVE | Noted: 2022-08-23

## 2023-05-16 PROBLEM — N13.1 HYDRONEPHROSIS WITH URETERAL STRICTURE: Status: ACTIVE | Noted: 2023-05-04

## 2023-05-26 ENCOUNTER — TELEPHONE (OUTPATIENT)
Age: 63
End: 2023-05-26

## 2023-05-26 ENCOUNTER — TELEMEDICINE (OUTPATIENT)
Age: 63
End: 2023-05-26
Payer: MEDICARE

## 2023-05-26 DIAGNOSIS — Z96.0 URETERAL STENT PRESENT: ICD-10-CM

## 2023-05-26 DIAGNOSIS — N13.5 URETERAL STRICTURE: Primary | ICD-10-CM

## 2023-05-26 DIAGNOSIS — N13.1 HYDRONEPHROSIS WITH URETERAL STRICTURE: ICD-10-CM

## 2023-05-26 DIAGNOSIS — N31.9 NEUROGENIC BLADDER: ICD-10-CM

## 2023-05-26 DIAGNOSIS — N39.46 MIXED STRESS AND URGE URINARY INCONTINENCE: ICD-10-CM

## 2023-05-26 DIAGNOSIS — Q62.11 HYDRONEPHROSIS WITH URETEROPELVIC JUNCTION (UPJ) OBSTRUCTION: ICD-10-CM

## 2023-05-26 DIAGNOSIS — N18.5 STAGE 5 CHRONIC KIDNEY DISEASE NOT ON CHRONIC DIALYSIS (HCC): ICD-10-CM

## 2023-05-26 PROCEDURE — 4004F PT TOBACCO SCREEN RCVD TLK: CPT | Performed by: UROLOGY

## 2023-05-26 PROCEDURE — G8419 CALC BMI OUT NRM PARAM NOF/U: HCPCS | Performed by: UROLOGY

## 2023-05-26 PROCEDURE — G8428 CUR MEDS NOT DOCUMENT: HCPCS | Performed by: UROLOGY

## 2023-05-26 PROCEDURE — 3017F COLORECTAL CA SCREEN DOC REV: CPT | Performed by: UROLOGY

## 2023-05-26 PROCEDURE — 99214 OFFICE O/P EST MOD 30 MIN: CPT | Performed by: UROLOGY

## 2023-05-26 RX ORDER — MAGNESIUM 30 MG
30 TABLET ORAL 2 TIMES DAILY
COMMUNITY

## 2023-05-26 RX ORDER — NALTREXONE HYDROCHLORIDE 50 MG/1
50 TABLET, FILM COATED ORAL DAILY
COMMUNITY

## 2023-05-26 NOTE — ASSESSMENT & PLAN NOTE
Tortuous UPJ's. Managed with ureteral stents. If she does go on dialysis we may try to manage without stents.

## 2023-05-26 NOTE — ASSESSMENT & PLAN NOTE
History of ureteral reimplantation for UVJ obstruction. UPJ tortuosity and kinking managed with ureteral stents.

## 2023-05-26 NOTE — PROGRESS NOTES
Divya Car, was evaluated through a synchronous (real-time) audio-video encounter. The patient (or guardian if applicable) is aware that this is a billable service, which includes applicable co-pays. This Virtual Visit was conducted with patient's (and/or legal guardian's) consent. The visit was conducted pursuant to the emergency declaration under the [address] [place] [address] authority and the [employer] Act. Patient identification was verified, and a caregiver was present when appropriate. The patient was located in a state where the provider was licensed to provide care. HISTORY OF PRESENT ILLNESS  Divya Car is a 66 y.o. female. has a past medical history of Arthritis, Burning with urination, Chronic kidney disease, CKD (chronic kidney disease), GERD (gastroesophageal reflux disease), MS (multiple sclerosis) ([facility] [address] Nonneurogenic neurogenic bladder dysfunction, Osteoporosis, and S/P ureteral stent placement. has a past surgical history that includes Urological Surgery (Bilateral, 05/27/2021); Urological Surgery (03/03/2022); Urological Surgery (Bilateral, 03/03/2022); Urological Surgery (Bilateral, 03/03/2022); Urological Surgery (05/02/2022); Urological Surgery (Bilateral, 05/02/2022); Urological Surgery (05/02/2022); Urological Surgery (Bilateral, 05/02/2022); Urological Surgery (05/02/2022); Urological Surgery; Urological Surgery (09/06/2022); Urological Surgery (Bilateral, 09/06/2022); Urological Surgery (Bilateral, 05/27/2021); Urological Surgery (05/27/2021); Urological Surgery (Bilateral, 12/07/2020); Urological Surgery (Bilateral, 12/07/2020); Urological Surgery (Bilateral, 09/06/2022); Urological Surgery (12/07/2020); other surgical history; femur/knee surg unlisted (Right); Ankle fracture surgery (Right); Femur fracture surgery; Hysterectomy; Urological Surgery (12/07/2020);  Cystoscopy

## 2023-05-26 NOTE — ASSESSMENT & PLAN NOTE
She is managed with bilateral ureteral stents last changed 5/4/2023. She has tortuous UPJ's. She is status post bilateral ureteral reimplantation May 2022. She is still managed with stents last changed 5/4/23. She had no problems afterward.

## 2023-05-26 NOTE — ASSESSMENT & PLAN NOTE
It seems that she is pending dialysis. She may start peritoneal dialysis and has consultation for a catheter placement. [facility]

## 2023-09-21 PROBLEM — N39.46 MIXED STRESS AND URGE URINARY INCONTINENCE: Status: ACTIVE | Noted: 2021-05-21

## 2023-09-21 PROBLEM — N18.5 STAGE 5 CHRONIC KIDNEY DISEASE (HCC): Status: ACTIVE | Noted: 2021-05-20

## 2023-09-21 PROBLEM — N13.30 HYDRONEPHROSIS: Status: ACTIVE | Noted: 2020-12-07

## 2023-09-21 PROBLEM — N35.919 URETHRAL STRICTURE: Status: ACTIVE | Noted: 2023-05-17

## 2023-09-21 RX ORDER — PSEUDOEPHEDRINE HCL 30 MG
100 TABLET ORAL DAILY
COMMUNITY

## 2023-09-26 ENCOUNTER — OFFICE VISIT (OUTPATIENT)
Age: 63
End: 2023-09-26
Payer: MEDICARE

## 2023-09-26 ENCOUNTER — TELEPHONE (OUTPATIENT)
Age: 63
End: 2023-09-26

## 2023-09-26 VITALS
BODY MASS INDEX: 29.41 KG/M2 | HEIGHT: 63 IN | DIASTOLIC BLOOD PRESSURE: 78 MMHG | SYSTOLIC BLOOD PRESSURE: 156 MMHG | WEIGHT: 166 LBS | HEART RATE: 74 BPM

## 2023-09-26 DIAGNOSIS — Z00.00 ANNUAL VISIT FOR GENERAL ADULT MEDICAL EXAMINATION WITHOUT ABNORMAL FINDINGS: Primary | ICD-10-CM

## 2023-09-26 DIAGNOSIS — N95.9 POST MENOPAUSAL PROBLEMS: ICD-10-CM

## 2023-09-26 DIAGNOSIS — M81.0 OSTEOPOROSIS WITHOUT CURRENT PATHOLOGICAL FRACTURE, UNSPECIFIED OSTEOPOROSIS TYPE: Primary | ICD-10-CM

## 2023-09-26 PROCEDURE — 99386 PREV VISIT NEW AGE 40-64: CPT | Performed by: OBSTETRICS & GYNECOLOGY

## 2023-09-26 NOTE — PROGRESS NOTES
Katia Decker is a 66 y.o. female who presents today for the following:  Chief Complaint   Patient presents with    Annual Exam        Allergies   Allergen Reactions    Latex Itching     Per patient: \"just regular, no anaphylaxis\"  Other reaction(s): rash/itching, Unknown  Per patient: \"just regular, no anaphylaxis\"  Per patient: \"just regular, no anaphylaxis\"  Per patient: \"just regular, no anaphylaxis\"  Per patient: \"just regular, no anaphylaxis\"  Per patient: \"just regular, no anaphylaxis\"  Per patient: \"just regular, no anaphylaxis\"  Per patient: \"just regular, no anaphylaxis\"      Natalizumab Hives    Interferon Beta-1a      Other reaction(s): Unknown  Reaction Type: Allergy    Interferon Beta-1b Rash and Hives     Other reaction(s): Unknown  Reaction Type: Allergy       Current Outpatient Medications   Medication Sig Dispense Refill    docusate (COLACE, DULCOLAX) 100 MG CAPS Take 100 mg by mouth daily      Multiple Vitamins-Minerals (PRESERVISION AREDS 2 PO) Take by mouth      naltrexone (DEPADE) 50 MG tablet Take 1 tablet by mouth daily      magnesium 30 MG tablet Take 1 tablet by mouth 2 times daily      vitamin D 25 MCG (1000 UT) CAPS Take 500 Units by mouth daily      pregabalin (LYRICA) 100 MG capsule Take 50 mg by mouth 2 times daily. sodium bicarbonate 650 MG tablet Take by mouth 3 times daily      tamsulosin (FLOMAX) 0.4 MG capsule Take 1 capsule by mouth daily      tiZANidine (ZANAFLEX) 2 MG tablet Take by mouth 3 times daily      bisacodyl (DULCOLAX) 5 MG EC tablet Take 1 tablet by mouth daily as needed (Patient not taking: Reported on 9/26/2023)       No current facility-administered medications for this visit.        Past Medical History:   Diagnosis Date    Arthritis     Burning with urination     Chronic kidney disease     no dialysis    CKD (chronic kidney disease)     GERD (gastroesophageal reflux disease)     MS (multiple sclerosis) ([facility])     Nonneurogenic neurogenic bladder

## 2023-09-26 NOTE — TELEPHONE ENCOUNTER
09/26/23 @ 1:06PM Rafael'ruby ALMENDAREZ from [facility] in Centralized Scheduling that the patient's Bone Density order needs to be updated as the Diagnosis code that has been inputted is not a valid code to be covered by patient's insurance [employer]---once the order is updated please give her a call @ [phone].

## 2023-09-30 LAB
CYTOLOGIST CVX/VAG CYTO: NORMAL
CYTOLOGY CVX/VAG DOC CYTO: NORMAL
CYTOLOGY CVX/VAG DOC THIN PREP: NORMAL
DX ICD CODE: NORMAL
HPV I/H RISK 4 DNA CVX QL PROBE+SIG AMP: NEGATIVE
Lab: NORMAL
OTHER STN SPEC: NORMAL
STAT OF ADQ CVX/VAG CYTO-IMP: NORMAL

## 2023-10-04 ENCOUNTER — HOSPITAL ENCOUNTER (OUTPATIENT)
Facility: HOSPITAL | Age: 63
Discharge: HOME | End: 2023-10-07
Attending: OBSTETRICS & GYNECOLOGY
Payer: MEDICARE

## 2023-10-04 VITALS — BODY MASS INDEX: 29.41 KG/M2 | WEIGHT: 166 LBS | HEIGHT: 63 IN

## 2023-10-04 DIAGNOSIS — M81.0 OSTEOPOROSIS WITHOUT CURRENT PATHOLOGICAL FRACTURE, UNSPECIFIED OSTEOPOROSIS TYPE: ICD-10-CM

## 2023-10-04 DIAGNOSIS — N95.9 POST MENOPAUSAL PROBLEMS: ICD-10-CM

## 2023-10-04 DIAGNOSIS — Z12.31 VISIT FOR SCREENING MAMMOGRAM: ICD-10-CM

## 2023-10-04 PROCEDURE — 77063 BREAST TOMOSYNTHESIS BI: CPT

## 2023-10-04 PROCEDURE — 77080 DXA BONE DENSITY AXIAL: CPT

## 2023-10-09 ENCOUNTER — TELEPHONE (OUTPATIENT)
Age: 63
End: 2023-10-09

## 2023-10-09 NOTE — TELEPHONE ENCOUNTER
----- Message from Norah Pace sent at 10/9/2023  2:44 PM EDT -----    ----- Message -----  From: Maria Del Rosario Garcia MD  Sent: 10/9/2023   2:05 PM EDT  To: Onelia Garner MA    Please contact patient regarding abnormal lab results   Abn dexa scan, need to follow up with PCP for osteoporosis Tx

## 2023-10-09 NOTE — TELEPHONE ENCOUNTER
Spoke with patient and advised that Dr. Saad Singh would like for her to see her PCP for treatment for abnormal bone density showing osteoporosis. She was advised to schedule an appt with PCP to discuss treatment.

## 2024-01-24 ENCOUNTER — HOSPITAL ENCOUNTER (EMERGENCY)
Facility: HOSPITAL | Age: 64
Discharge: HOME | End: 2024-01-24
Attending: STUDENT IN AN ORGANIZED HEALTH CARE EDUCATION/TRAINING PROGRAM
Payer: MEDICARE

## 2024-01-24 VITALS
OXYGEN SATURATION: 99 % | WEIGHT: 163 LBS | HEART RATE: 61 BPM | BODY MASS INDEX: 28.88 KG/M2 | DIASTOLIC BLOOD PRESSURE: 64 MMHG | HEIGHT: 63 IN | RESPIRATION RATE: 18 BRPM | TEMPERATURE: 98.1 F | SYSTOLIC BLOOD PRESSURE: 122 MMHG

## 2024-01-24 DIAGNOSIS — N30.01 ACUTE CYSTITIS WITH HEMATURIA: Primary | ICD-10-CM

## 2024-01-24 LAB
ALBUMIN SERPL-MCNC: 4.4 G/DL (ref 3.5–5)
ALBUMIN/GLOB SERPL: 1 (ref 1.1–2.2)
ALP SERPL-CCNC: 158 U/L (ref 45–117)
ALT SERPL-CCNC: 11 U/L (ref 12–78)
ANION GAP SERPL CALC-SCNC: 11 MMOL/L (ref 5–15)
APPEARANCE UR: ABNORMAL
AST SERPL W P-5'-P-CCNC: 9 U/L (ref 15–37)
BACTERIA URNS QL MICRO: ABNORMAL /HPF
BASOPHILS # BLD: 0 K/UL (ref 0–0.1)
BASOPHILS NFR BLD: 1 % (ref 0–1)
BILIRUB SERPL-MCNC: 0.4 MG/DL (ref 0.2–1)
BILIRUB UR QL: NEGATIVE
BUN SERPL-MCNC: 53 MG/DL (ref 6–20)
BUN/CREAT SERPL: 11 (ref 12–20)
CA-I BLD-MCNC: 10 MG/DL (ref 8.5–10.1)
CHLORIDE SERPL-SCNC: 105 MMOL/L (ref 97–108)
CO2 SERPL-SCNC: 22 MMOL/L (ref 21–32)
COLOR UR: YELLOW
CREAT SERPL-MCNC: 4.82 MG/DL (ref 0.55–1.02)
DIFFERENTIAL METHOD BLD: ABNORMAL
EOSINOPHIL # BLD: 0.1 K/UL (ref 0–0.4)
EOSINOPHIL NFR BLD: 2 % (ref 0–7)
EPITH CASTS URNS QL MICRO: ABNORMAL /LPF
ERYTHROCYTE [DISTWIDTH] IN BLOOD BY AUTOMATED COUNT: 15.5 % (ref 11.5–14.5)
GLOBULIN SER CALC-MCNC: 4.2 G/DL (ref 2–4)
GLUCOSE SERPL-MCNC: 95 MG/DL (ref 65–100)
GLUCOSE UR STRIP.AUTO-MCNC: NEGATIVE MG/DL
HCT VFR BLD AUTO: 44.4 % (ref 35–47)
HGB BLD-MCNC: 13.4 G/DL (ref 11.5–16)
HGB UR QL STRIP: ABNORMAL
IMM GRANULOCYTES # BLD AUTO: 0 K/UL (ref 0–0.04)
IMM GRANULOCYTES NFR BLD AUTO: 0 % (ref 0–0.5)
KETONES UR QL STRIP.AUTO: NEGATIVE MG/DL
LEUKOCYTE ESTERASE UR QL STRIP.AUTO: ABNORMAL
LYMPHOCYTES # BLD: 2.2 K/UL (ref 0.8–3.5)
LYMPHOCYTES NFR BLD: 39 % (ref 12–49)
MCH RBC QN AUTO: 23.3 PG (ref 26–34)
MCHC RBC AUTO-ENTMCNC: 30.2 G/DL (ref 30–36.5)
MCV RBC AUTO: 77.1 FL (ref 80–99)
MONOCYTES # BLD: 0.8 K/UL (ref 0–1)
MONOCYTES NFR BLD: 14 % (ref 5–13)
NEUTS SEG # BLD: 2.6 K/UL (ref 1.8–8)
NEUTS SEG NFR BLD: 44 % (ref 32–75)
NITRITE UR QL STRIP.AUTO: NEGATIVE
PH UR STRIP: 6 (ref 5–8)
PLATELET # BLD AUTO: 188 K/UL (ref 150–400)
PMV BLD AUTO: 9.9 FL (ref 8.9–12.9)
POTASSIUM SERPL-SCNC: 5 MMOL/L (ref 3.5–5.1)
PROT SERPL-MCNC: 8.6 G/DL (ref 6.4–8.2)
PROT UR STRIP-MCNC: 30 MG/DL
RBC # BLD AUTO: 5.76 M/UL (ref 3.8–5.2)
RBC #/AREA URNS HPF: >100 /HPF (ref 0–5)
SODIUM SERPL-SCNC: 138 MMOL/L (ref 136–145)
SP GR UR REFRACTOMETRY: 1.01 (ref 1–1.03)
URINE CULTURE IF INDICATED: ABNORMAL
UROBILINOGEN UR QL STRIP.AUTO: 0.1 EU/DL (ref 0.2–1)
WBC # BLD AUTO: 5.8 K/UL (ref 3.6–11)
WBC URNS QL MICRO: >100 /HPF (ref 0–4)

## 2024-01-24 PROCEDURE — 87086 URINE CULTURE/COLONY COUNT: CPT

## 2024-01-24 PROCEDURE — 81001 URINALYSIS AUTO W/SCOPE: CPT

## 2024-01-24 PROCEDURE — 85025 COMPLETE CBC W/AUTO DIFF WBC: CPT

## 2024-01-24 PROCEDURE — 93005 ELECTROCARDIOGRAM TRACING: CPT | Performed by: STUDENT IN AN ORGANIZED HEALTH CARE EDUCATION/TRAINING PROGRAM

## 2024-01-24 PROCEDURE — 99284 EMERGENCY DEPT VISIT MOD MDM: CPT

## 2024-01-24 PROCEDURE — 80053 COMPREHEN METABOLIC PANEL: CPT

## 2024-01-24 RX ORDER — CEPHALEXIN 250 MG/1
250 CAPSULE ORAL 4 TIMES DAILY
Qty: 20 CAPSULE | Refills: 0 | Status: SHIPPED | OUTPATIENT
Start: 2024-01-24 | End: 2024-01-29

## 2024-01-24 ASSESSMENT — PAIN DESCRIPTION - ORIENTATION: ORIENTATION: LOWER;LEFT;RIGHT

## 2024-01-24 ASSESSMENT — PAIN - FUNCTIONAL ASSESSMENT: PAIN_FUNCTIONAL_ASSESSMENT: 0-10

## 2024-01-24 ASSESSMENT — LIFESTYLE VARIABLES
HOW OFTEN DO YOU HAVE A DRINK CONTAINING ALCOHOL: NEVER
HOW MANY STANDARD DRINKS CONTAINING ALCOHOL DO YOU HAVE ON A TYPICAL DAY: PATIENT DOES NOT DRINK

## 2024-01-24 ASSESSMENT — PAIN DESCRIPTION - LOCATION: LOCATION: ABDOMEN

## 2024-01-24 ASSESSMENT — PAIN DESCRIPTION - DESCRIPTORS: DESCRIPTORS: ACHING

## 2024-01-24 NOTE — ED PROVIDER NOTES
as: Keflex  Take 1 capsule by mouth 4 times daily for 5 days            ASK your doctor about these medications      bisacodyl 5 MG EC tablet  Commonly known as: DULCOLAX     docusate 100 MG Caps  Commonly known as: COLACE, DULCOLAX     magnesium 30 MG tablet     naltrexone 50 MG tablet  Commonly known as: DEPADE     pregabalin 100 MG capsule  Commonly known as: LYRICA     PRESERVISION AREDS 2 PO     sodium bicarbonate 650 MG tablet     tamsulosin 0.4 MG capsule  Commonly known as: FLOMAX     tiZANidine 2 MG tablet  Commonly known as: ZANAFLEX     vitamin D 25 MCG (1000 UT) Caps               Where to Get Your Medications        These medications were sent to [facility] [address] [place] [address] - P [phone] - F [phone]  [address] [place] [address]      Phone: [phone]   cephALEXin 250 MG capsule           DISCONTINUED MEDICATIONS:  Discharge Medication List as of 1/24/2024  5:44 PM          I am the Primary Clinician of Record: Anurag Potter MD (electronically signed)    (Please note that parts of this dictation were completed with voice recognition software. Quite often unanticipated grammatical, syntax, homophones, and other interpretive errors are inadvertently transcribed by the computer software. Please disregards these errors. Please excuse any errors that have escaped final proofreading.)     Anurag Potter MD  01/25/24 0725

## 2024-01-25 LAB
BACTERIA SPEC CULT: NORMAL
COLONY COUNT, CNT: NORMAL
COLONY COUNT, CNT: NORMAL
Lab: NORMAL

## 2024-01-26 LAB
EKG ATRIAL RATE: 64 BPM
EKG DIAGNOSIS: NORMAL
EKG P AXIS: 64 DEGREES
EKG P-R INTERVAL: 156 MS
EKG Q-T INTERVAL: 434 MS
EKG QRS DURATION: 84 MS
EKG QTC CALCULATION (BAZETT): 447 MS
EKG R AXIS: -8 DEGREES
EKG T AXIS: 45 DEGREES
EKG VENTRICULAR RATE: 64 BPM

## 2024-02-07 ENCOUNTER — TELEMEDICINE (OUTPATIENT)
Age: 64
End: 2024-02-07
Payer: MEDICARE

## 2024-02-07 DIAGNOSIS — N30.90 CYSTITIS: ICD-10-CM

## 2024-02-07 DIAGNOSIS — N13.5 URETERAL STRICTURE: ICD-10-CM

## 2024-02-07 DIAGNOSIS — N18.5 STAGE 5 CHRONIC KIDNEY DISEASE (HCC): ICD-10-CM

## 2024-02-07 DIAGNOSIS — Z96.0 URETERAL STENT PRESENT: Primary | ICD-10-CM

## 2024-02-07 DIAGNOSIS — R10.30 LOWER ABDOMINAL PAIN: ICD-10-CM

## 2024-02-07 PROCEDURE — 3017F COLORECTAL CA SCREEN DOC REV: CPT | Performed by: UROLOGY

## 2024-02-07 PROCEDURE — G8484 FLU IMMUNIZE NO ADMIN: HCPCS | Performed by: UROLOGY

## 2024-02-07 PROCEDURE — G8427 DOCREV CUR MEDS BY ELIG CLIN: HCPCS | Performed by: UROLOGY

## 2024-02-07 PROCEDURE — 99214 OFFICE O/P EST MOD 30 MIN: CPT | Performed by: UROLOGY

## 2024-02-07 PROCEDURE — G8419 CALC BMI OUT NRM PARAM NOF/U: HCPCS | Performed by: UROLOGY

## 2024-02-07 PROCEDURE — 1036F TOBACCO NON-USER: CPT | Performed by: UROLOGY

## 2024-02-07 RX ORDER — CEPHALEXIN 500 MG/1
500 CAPSULE ORAL 2 TIMES DAILY
Qty: 14 CAPSULE | Refills: 0 | Status: SHIPPED | OUTPATIENT
Start: 2024-02-07 | End: 2024-02-14

## 2024-02-07 ASSESSMENT — ENCOUNTER SYMPTOMS: ABDOMINAL PAIN: 1

## 2024-02-07 NOTE — ASSESSMENT & PLAN NOTE
Due for stent changes. Unclear if related to her symptoms.  May be colonized vs urine culture contaminant.  Plan on ureteral stent changes.

## 2024-02-07 NOTE — ASSESSMENT & PLAN NOTE
Mild bilateral UPJ obstructions with stents.    Stent changes May 2023.  She is due for stent changes.

## 2024-02-07 NOTE — ASSESSMENT & PLAN NOTE
Chronic kidney disease.  Her renal function has been stable just not yet on dialysis.  She is making urine.

## 2024-02-07 NOTE — PROGRESS NOTES
Chief Complaint   Patient presents with    Abdominal Pain     Since December 1. Have you been to the ER, urgent care clinic since your last visit?  Hospitalized since your last visit? Yes week ago UTI     2. Have you seen or consulted any other health care providers outside of the [facility] since your last visit?  Include any pap smears or colon screening. No    
was pregnant.     She is not on dialysis but has a fistula placed.      She has MS and neurogenic bladder.  She remains on tamsulosin and amitriptyline.  She has hydronephrosis due to tortuous, obstructed UPJ bilaterally.  She is managed with ureteral stents, last changed on 5/4/2023.  She has history of ureteral reimplantation 5/2/2022.  She has chronic kidney disease and neurogenic bladder.  She remains off dialysis.  We will continue stent changes due to her tenuous renal status.         Abdominal Pain                Component  Ref Range & Units 1/24/24 1700 9/6/22 1445 9/6/22 1157 6/27/22 1212 5/24/22 1144 5/4/22 0245 5/3/22 0614   Sodium  136 - 145 mmol/L 138 143  141 R 141 R 141 140   Potassium  3.5 - 5.1 mmol/L 5.0 6.0 High  5.9 High  5.2 R 4.7 R 5.1 6.1 High  CM   Chloride  97 - 108 mmol/L 105 114 High   103 R 104 R 115 High  115 High    CO2  21 - 32 mmol/L 22 22  21 R 20 R 19 Low  19 Low    Anion Gap  5 - 15 mmol/L 11 7    7 6   Glucose  65 - 100 mg/dL 95 81  71 R 83 R 94 87   BUN  6 - 20 mg/dL 53 High  59 High   64 High  R 52 High  R 55 High  64 High    Creatinine  0.55 - 1.02 mg/dL 4.82 High  5.33 High   5.76 High  R 4.21 High  R 5.42 High  4.95 High        1. Ureteral stent present  Overview:  Bilateral ureteral stents for ureteral stricture disease.    12/7/2020 bilateral ureteral stent placement by Dr. Abdulaziz Damon with assistance from Dr. Reno Garcia.  5/27/21: stent exchange.  Treated with nitrofurantoin on 5/27/21.  3/3/22: Cystoscopy, retrograde pyelograms, BILATERAL ureteral stent exchange.  5/2/22: she is s/p cystoscopy, bilateral retrograde pyelogram , bilateral robotic ureteral reimplantation, BILATERAL ureteral stent placement, ureteral dilation on 5/2/22.  5/24/22: removed.  Replaced for mild UPJ strictures 9/2022.  Changes 5/4/2023  Assessment & Plan:  Due for stent changes. Unclear if related to her symptoms.  May be colonized vs urine culture contaminant.  Plan on ureteral stent changes.

## 2024-02-22 ENCOUNTER — PREP FOR PROCEDURE (OUTPATIENT)
Age: 64
End: 2024-02-22

## 2024-02-22 DIAGNOSIS — Z96.0 RETAINED URETERAL STENT: ICD-10-CM

## 2024-02-28 RX ORDER — MAGNESIUM 30 MG
30 TABLET ORAL 2 TIMES DAILY
COMMUNITY

## 2024-03-05 ENCOUNTER — APPOINTMENT (OUTPATIENT)
Facility: HOSPITAL | Age: 64
End: 2024-03-05
Attending: UROLOGY
Payer: MEDICARE

## 2024-03-05 ENCOUNTER — HOSPITAL ENCOUNTER (OUTPATIENT)
Facility: HOSPITAL | Age: 64
Discharge: HOME | End: 2024-03-05
Attending: UROLOGY | Admitting: UROLOGY
Payer: MEDICARE

## 2024-03-05 ENCOUNTER — ANESTHESIA (OUTPATIENT)
Facility: HOSPITAL | Age: 64
End: 2024-03-05
Payer: MEDICARE

## 2024-03-05 ENCOUNTER — ANESTHESIA EVENT (OUTPATIENT)
Facility: HOSPITAL | Age: 64
End: 2024-03-05
Payer: MEDICARE

## 2024-03-05 VITALS
SYSTOLIC BLOOD PRESSURE: 135 MMHG | RESPIRATION RATE: 18 BRPM | WEIGHT: 165 LBS | DIASTOLIC BLOOD PRESSURE: 73 MMHG | HEIGHT: 63 IN | BODY MASS INDEX: 29.23 KG/M2 | HEART RATE: 74 BPM | TEMPERATURE: 97.4 F | OXYGEN SATURATION: 99 %

## 2024-03-05 LAB
ANION GAP BLD CALC-SCNC: 7
CA-I BLD-MCNC: 1.14 MMOL/L (ref 1.12–1.32)
CHLORIDE BLD-SCNC: 114 MMOL/L (ref 98–107)
CO2 BLD-SCNC: 21 MMOL/L
CREAT UR-MCNC: 5.33 MG/DL (ref 0.6–1.3)
GLUCOSE BLD STRIP.AUTO-MCNC: 74 MG/DL (ref 65–100)
LACTATE BLD-SCNC: 0.55 MMOL/L (ref 0.4–2)
PERFORMED BY:: ABNORMAL
POTASSIUM BLD-SCNC: 5.5 MMOL/L (ref 3.5–5.5)
SODIUM BLD-SCNC: 141 MMOL/L (ref 136–145)
SPECIMEN SITE: ABNORMAL

## 2024-03-05 PROCEDURE — 3600000003 HC SURGERY LEVEL 3 BASE: Performed by: UROLOGY

## 2024-03-05 PROCEDURE — 52332 CYSTOSCOPY AND TREATMENT: CPT | Performed by: UROLOGY

## 2024-03-05 PROCEDURE — 7100000000 HC PACU RECOVERY - FIRST 15 MIN: Performed by: UROLOGY

## 2024-03-05 PROCEDURE — 3600000013 HC SURGERY LEVEL 3 ADDTL 15MIN: Performed by: UROLOGY

## 2024-03-05 PROCEDURE — C2617 STENT, NON-COR, TEM W/O DEL: HCPCS | Performed by: UROLOGY

## 2024-03-05 PROCEDURE — 3700000000 HC ANESTHESIA ATTENDED CARE: Performed by: UROLOGY

## 2024-03-05 PROCEDURE — 83605 ASSAY OF LACTIC ACID: CPT

## 2024-03-05 PROCEDURE — 2580000003 HC RX 258: Performed by: UROLOGY

## 2024-03-05 PROCEDURE — 7100000011 HC PHASE II RECOVERY - ADDTL 15 MIN: Performed by: UROLOGY

## 2024-03-05 PROCEDURE — 6360000002 HC RX W HCPCS: Performed by: NURSE ANESTHETIST, CERTIFIED REGISTERED

## 2024-03-05 PROCEDURE — C1769 GUIDE WIRE: HCPCS | Performed by: UROLOGY

## 2024-03-05 PROCEDURE — 7100000010 HC PHASE II RECOVERY - FIRST 15 MIN: Performed by: UROLOGY

## 2024-03-05 PROCEDURE — 6360000002 HC RX W HCPCS: Performed by: UROLOGY

## 2024-03-05 PROCEDURE — 3700000001 HC ADD 15 MINUTES (ANESTHESIA): Performed by: UROLOGY

## 2024-03-05 PROCEDURE — 7100000001 HC PACU RECOVERY - ADDTL 15 MIN: Performed by: UROLOGY

## 2024-03-05 PROCEDURE — 80047 BASIC METABLC PNL IONIZED CA: CPT

## 2024-03-05 PROCEDURE — 2709999900 HC NON-CHARGEABLE SUPPLY: Performed by: UROLOGY

## 2024-03-05 PROCEDURE — 74420 UROGRAPHY RTRGR +-KUB: CPT | Performed by: UROLOGY

## 2024-03-05 PROCEDURE — 2500000003 HC RX 250 WO HCPCS: Performed by: NURSE ANESTHETIST, CERTIFIED REGISTERED

## 2024-03-05 PROCEDURE — 76000 FLUOROSCOPY <1 HR PHYS/QHP: CPT

## 2024-03-05 PROCEDURE — 6360000004 HC RX CONTRAST MEDICATION: Performed by: UROLOGY

## 2024-03-05 DEVICE — URETERAL STENT
Type: IMPLANTABLE DEVICE | Site: URETER | Status: FUNCTIONAL
Brand: PERCUFLEX™ PLUS

## 2024-03-05 RX ORDER — GLUCAGON 1 MG/ML
1 KIT INJECTION PRN
Status: DISCONTINUED | OUTPATIENT
Start: 2024-03-05 | End: 2024-03-05 | Stop reason: HOSPADM

## 2024-03-05 RX ORDER — SODIUM CHLORIDE 9 MG/ML
INJECTION, SOLUTION INTRAVENOUS PRN
Status: DISCONTINUED | OUTPATIENT
Start: 2024-03-05 | End: 2024-03-05 | Stop reason: HOSPADM

## 2024-03-05 RX ORDER — DEXAMETHASONE SODIUM PHOSPHATE 4 MG/ML
INJECTION, SOLUTION INTRA-ARTICULAR; INTRALESIONAL; INTRAMUSCULAR; INTRAVENOUS; SOFT TISSUE PRN
Status: DISCONTINUED | OUTPATIENT
Start: 2024-03-05 | End: 2024-03-05 | Stop reason: SDUPTHER

## 2024-03-05 RX ORDER — METOCLOPRAMIDE HYDROCHLORIDE 5 MG/ML
10 INJECTION INTRAMUSCULAR; INTRAVENOUS
Status: DISCONTINUED | OUTPATIENT
Start: 2024-03-05 | End: 2024-03-05 | Stop reason: HOSPADM

## 2024-03-05 RX ORDER — SODIUM CHLORIDE 9 MG/ML
INJECTION, SOLUTION INTRAVENOUS CONTINUOUS
Status: DISCONTINUED | OUTPATIENT
Start: 2024-03-05 | End: 2024-03-05 | Stop reason: HOSPADM

## 2024-03-05 RX ORDER — HYDRALAZINE HYDROCHLORIDE 20 MG/ML
10 INJECTION INTRAMUSCULAR; INTRAVENOUS
Status: DISCONTINUED | OUTPATIENT
Start: 2024-03-05 | End: 2024-03-05 | Stop reason: HOSPADM

## 2024-03-05 RX ORDER — ONDANSETRON 2 MG/ML
4 INJECTION INTRAMUSCULAR; INTRAVENOUS
Status: DISCONTINUED | OUTPATIENT
Start: 2024-03-05 | End: 2024-03-05 | Stop reason: HOSPADM

## 2024-03-05 RX ORDER — SODIUM CHLORIDE 0.9 % (FLUSH) 0.9 %
5-40 SYRINGE (ML) INJECTION EVERY 12 HOURS SCHEDULED
Status: DISCONTINUED | OUTPATIENT
Start: 2024-03-05 | End: 2024-03-05 | Stop reason: HOSPADM

## 2024-03-05 RX ORDER — SODIUM CHLORIDE, SODIUM LACTATE, POTASSIUM CHLORIDE, CALCIUM CHLORIDE 600; 310; 30; 20 MG/100ML; MG/100ML; MG/100ML; MG/100ML
INJECTION, SOLUTION INTRAVENOUS ONCE
Status: DISCONTINUED | OUTPATIENT
Start: 2024-03-05 | End: 2024-03-05 | Stop reason: HOSPADM

## 2024-03-05 RX ORDER — ONDANSETRON 2 MG/ML
INJECTION INTRAMUSCULAR; INTRAVENOUS PRN
Status: DISCONTINUED | OUTPATIENT
Start: 2024-03-05 | End: 2024-03-05 | Stop reason: SDUPTHER

## 2024-03-05 RX ORDER — FENTANYL CITRATE 50 UG/ML
INJECTION, SOLUTION INTRAMUSCULAR; INTRAVENOUS PRN
Status: DISCONTINUED | OUTPATIENT
Start: 2024-03-05 | End: 2024-03-05

## 2024-03-05 RX ORDER — OXYCODONE HYDROCHLORIDE 5 MG/1
10 TABLET ORAL PRN
Status: DISCONTINUED | OUTPATIENT
Start: 2024-03-05 | End: 2024-03-05 | Stop reason: HOSPADM

## 2024-03-05 RX ORDER — MIDAZOLAM HYDROCHLORIDE 1 MG/ML
INJECTION INTRAMUSCULAR; INTRAVENOUS PRN
Status: DISCONTINUED | OUTPATIENT
Start: 2024-03-05 | End: 2024-03-05 | Stop reason: SDUPTHER

## 2024-03-05 RX ORDER — DEXTROSE MONOHYDRATE 100 MG/ML
INJECTION, SOLUTION INTRAVENOUS CONTINUOUS PRN
Status: DISCONTINUED | OUTPATIENT
Start: 2024-03-05 | End: 2024-03-05 | Stop reason: HOSPADM

## 2024-03-05 RX ORDER — FINGOLIMOD HYDROCHLORIDE 0.5 MG/1
CAPSULE ORAL
COMMUNITY

## 2024-03-05 RX ORDER — FENTANYL CITRATE 50 UG/ML
25 INJECTION, SOLUTION INTRAMUSCULAR; INTRAVENOUS EVERY 5 MIN PRN
Status: DISCONTINUED | OUTPATIENT
Start: 2024-03-05 | End: 2024-03-05 | Stop reason: HOSPADM

## 2024-03-05 RX ORDER — PROPOFOL 10 MG/ML
INJECTION, EMULSION INTRAVENOUS PRN
Status: DISCONTINUED | OUTPATIENT
Start: 2024-03-05 | End: 2024-03-05 | Stop reason: SDUPTHER

## 2024-03-05 RX ORDER — SODIUM CHLORIDE 0.9 % (FLUSH) 0.9 %
5-40 SYRINGE (ML) INJECTION PRN
Status: DISCONTINUED | OUTPATIENT
Start: 2024-03-05 | End: 2024-03-05 | Stop reason: HOSPADM

## 2024-03-05 RX ORDER — IPRATROPIUM BROMIDE AND ALBUTEROL SULFATE 2.5; .5 MG/3ML; MG/3ML
1 SOLUTION RESPIRATORY (INHALATION)
Status: DISCONTINUED | OUTPATIENT
Start: 2024-03-05 | End: 2024-03-05 | Stop reason: HOSPADM

## 2024-03-05 RX ORDER — OXYCODONE HYDROCHLORIDE 5 MG/1
5 TABLET ORAL PRN
Status: DISCONTINUED | OUTPATIENT
Start: 2024-03-05 | End: 2024-03-05 | Stop reason: HOSPADM

## 2024-03-05 RX ORDER — HYDROMORPHONE HYDROCHLORIDE 1 MG/ML
0.5 INJECTION, SOLUTION INTRAMUSCULAR; INTRAVENOUS; SUBCUTANEOUS EVERY 10 MIN PRN
Status: DISCONTINUED | OUTPATIENT
Start: 2024-03-05 | End: 2024-03-05 | Stop reason: HOSPADM

## 2024-03-05 RX ORDER — LABETALOL HYDROCHLORIDE 5 MG/ML
10 INJECTION, SOLUTION INTRAVENOUS
Status: DISCONTINUED | OUTPATIENT
Start: 2024-03-05 | End: 2024-03-05 | Stop reason: HOSPADM

## 2024-03-05 RX ORDER — DIPHENHYDRAMINE HYDROCHLORIDE 50 MG/ML
12.5 INJECTION INTRAMUSCULAR; INTRAVENOUS
Status: DISCONTINUED | OUTPATIENT
Start: 2024-03-05 | End: 2024-03-05 | Stop reason: HOSPADM

## 2024-03-05 RX ADMIN — MIDAZOLAM HYDROCHLORIDE 2 MG: 1 INJECTION INTRAMUSCULAR; INTRAVENOUS at 13:18

## 2024-03-05 RX ADMIN — CEFAZOLIN SODIUM 2000 MG: 1 INJECTION, POWDER, FOR SOLUTION INTRAMUSCULAR; INTRAVENOUS at 13:33

## 2024-03-05 RX ADMIN — PROPOFOL 150 MG: 10 INJECTION, EMULSION INTRAVENOUS at 13:27

## 2024-03-05 RX ADMIN — DEXAMETHASONE SODIUM PHOSPHATE 4 MG: 4 INJECTION, SOLUTION INTRA-ARTICULAR; INTRALESIONAL; INTRAMUSCULAR; INTRAVENOUS; SOFT TISSUE at 13:34

## 2024-03-05 RX ADMIN — ONDANSETRON 4 MG: 2 INJECTION INTRAMUSCULAR; INTRAVENOUS at 13:34

## 2024-03-05 RX ADMIN — Medication 40 MG: at 13:27

## 2024-03-05 RX ADMIN — SODIUM CHLORIDE: 9 INJECTION, SOLUTION INTRAVENOUS at 11:54

## 2024-03-05 ASSESSMENT — PAIN SCALES - GENERAL
PAINLEVEL_OUTOF10: 0

## 2024-03-05 ASSESSMENT — PAIN - FUNCTIONAL ASSESSMENT
PAIN_FUNCTIONAL_ASSESSMENT: 0-10

## 2024-03-05 NOTE — ANESTHESIA PRE PROCEDURE
Department of Anesthesiology  Preprocedure Note       Name:  Peter Alan   Age:  63 y.o.  :  1960                                          MRN:  [id number]         Date:  3/5/2024      Surgeon: Surgeon(s):  Reno Garcia MD    Procedure: Procedure(s):  CYSTOURETHROSCOPY AND BILATERAL URETERAL STENT CHANGE    Medications prior to admission:   Prior to Admission medications    Medication Sig Start Date End Date Taking? Authorizing Provider   Fingolimod HCl (GILENYA) 0.5 MG CAPS Take by mouth   Yes Shawna Gonzlaes MD   magnesium 30 MG tablet Take 1 tablet by mouth 2 times daily   Yes Shawna Gonzales MD   docusate (COLACE, DULCOLAX) 100 MG CAPS Take 100 mg by mouth daily    ProviderShawna MD   vitamin D 25 MCG (1000 UT) CAPS Take 500 Units by mouth daily    Automatic Reconciliation, Ar   pregabalin (LYRICA) 100 MG capsule Take 50 mg by mouth 2 times daily.    Automatic Reconciliation, Ar   sodium bicarbonate 650 MG tablet Take by mouth 3 times daily    Automatic Reconciliation, Ar   tamsulosin (FLOMAX) 0.4 MG capsule Take 1 capsule by mouth daily 21   Automatic Reconciliation, Ar   tiZANidine (ZANAFLEX) 2 MG tablet Take by mouth 3 times daily    Automatic Reconciliation, Ar       Current medications:    Current Facility-Administered Medications   Medication Dose Route Frequency Provider Last Rate Last Admin   • 0.9 % sodium chloride infusion   IntraVENous Continuous Reno Garcia MD 20 mL/hr at 24 1154 New Bag at 24 1154   • ceFAZolin (ANCEF) 2,000 mg in sterile water 20 mL IV syringe  2,000 mg IntraVENous Once Reno Garcia MD           Allergies:    Allergies   Allergen Reactions   • Latex Itching     Per patient: \"just regular, no anaphylaxis\"  Other reaction(s): rash/itching, Unknown  Per patient: \"just regular, no anaphylaxis\"  Per patient: \"just regular, no anaphylaxis\"  Per patient: \"just regular, no anaphylaxis\"  Per patient: \"just regular, no anaphylaxis\"  Per

## 2024-03-05 NOTE — OP NOTE
Operative Note      Patient: Peter Alan  YOB: 1960  MRN: [id number]    Date of Procedure: 3/5/2024    Pre-Op Diagnosis Codes:     * Ureteral stricture [N13.5]     * Retained ureteral stent [Z96.0]    Post-Op Diagnosis: Same       Procedure(s):  CYSTOURETHROSCOPY AND BILATERAL URETERAL STENT CHANGE    Surgeon(s):  Reno Garcia MD    Assistant:   * No surgical staff found *    Anesthesia: General    Estimated Blood Loss (mL): Minimal    Complications: None    Specimens:   * No specimens in log *    Implants:  Implant Name Type Inv. Item Serial No.  Lot No. LRB No. Used Action   STENT URO DBL PGTL TAPR TIP THRD HYDR+ COAT RADPQ Y [id number] - [id number]  STENT URO DBL PGTL TAPR TIP THRD HYDR+ COAT RADPQ Y [id number]  [facility] [id number] Right 1 Implanted   STENT URO DBL PGTL TAPR TIP THRD HYDR+ COAT RADPQ Y [id number] - [id number]  STENT URO DBL PGTL TAPR TIP THRD HYDR+ COAT RADPQ Y [id number]  [facility] [id number] Left 1 Implanted   (HISTORICAL) STENT URET 6FR L24CM DBL PIGTAILS LUBRICIOUS COAT TAPR TIP - SN/A   N/A [employer] [id number] Left 1 Explanted   (HISTORICAL) STENT URET 6FR L24CM DBL PIGTAILS LUBRICIOUS COAT TAPR TIP - SN/A   N/A [employer] [id number] Right 1 Explanted         Drains: * No LDAs found *    Findings: bilateral ureteral stents with neoorifices  Interpretation of left retrograde pyelogram: Markedly dilated left ureter with tortuosity and distended renal pelvis and calyces.    Interpretation of right retrograde pyelogram: Markedly dilated right ureter with tortuosity, focal stricture at the proximal ureter with severe dilation of the renal pelvis and calyces.        Detailed Description of Procedure:   The patient has a history of bilateral hydronephrosis and chronic kidney disease.  Prior history of ureteral reimplantation and presents for routine stent changes.  The patient was given sufficient anesthesia and placed in a relaxed

## 2024-03-05 NOTE — H&P
UROLOGY HISTORY AND PHYSICAL  Reno Garcia MD  [phone] Office    Patient: Peter Alan MRN: [id number]  SSN: [id number]    YOB: 1960  Age: 63 y.o.  Sex: female          Date of Encounter:  March 5, 2024  Chief Complaint:  bilateral ureteral stents             History of Present Illness:  Patient is a 63 y.o. female here for surgery.    She has ESRD and is still making urine. She is not on dialysis but has a fistula placed.       She has MS and neurogenic bladder.  She remains on tamsulosin and amitriptyline.  She has hydronephrosis due to tortuous, obstructed UPJ bilaterally.  She is managed with ureteral stents, last changed on 9/6/2023.  She has history of ureteral reimplantation 5/2/2022.  She has chronic kidney disease and neurogenic bladder.  She remains off dialysis.  We will continue stent changes due to her tenuous renal status.          Past Medical History:  Allergies   Allergen Reactions    Latex Itching     Per patient: \"just regular, no anaphylaxis\"  Other reaction(s): rash/itching, Unknown  Per patient: \"just regular, no anaphylaxis\"  Per patient: \"just regular, no anaphylaxis\"  Per patient: \"just regular, no anaphylaxis\"  Per patient: \"just regular, no anaphylaxis\"  Per patient: \"just regular, no anaphylaxis\"  Per patient: \"just regular, no anaphylaxis\"  Per patient: \"just regular, no anaphylaxis\"      Natalizumab Hives    Interferon Beta-1a      Other reaction(s): Unknown  Reaction Type: Allergy    Interferon Beta-1b Rash and Hives     Other reaction(s): Unknown  Reaction Type: Allergy      Prior to Admission medications    Medication Sig Start Date End Date Taking? Authorizing Provider   Fingolimod HCl (GILENYA) 0.5 MG CAPS Take by mouth   Yes Shawna Gonzales MD   magnesium 30 MG tablet Take 1 tablet by mouth 2 times daily   Yes Shawna Gonzales MD   docusate (COLACE, DULCOLAX) 100 MG CAPS Take 100 mg by mouth daily    Shawna Gonzales MD

## 2024-03-05 NOTE — PROGRESS NOTES
Clarified with Dr Garcia to add Bilateral retrograde pyelogram to consent as plan on H+P  states.

## 2024-03-05 NOTE — PROGRESS NOTES
PT ACCEPTING LIQUIDS AND CRACKERS , VOIDED INCONT , IV DC'D SITE INTACT NO SWELLING NOTED , DISCHARGE INSTRUCTIONS GIVEN TO PT AND PT'S  SIERRA , BOTH VERBALIZED UNDERSTANDING , NO DISTRESS NOTED

## 2024-03-25 PROBLEM — N35.919 URETHRAL STRICTURE: Status: RESOLVED | Noted: 2023-05-17 | Resolved: 2024-03-25

## 2024-03-25 PROBLEM — Z96.0 URETERAL STENT PRESENT: Status: RESOLVED | Noted: 2021-05-20 | Resolved: 2024-03-25

## 2024-03-25 PROBLEM — N13.30 HYDRONEPHROSIS: Status: RESOLVED | Noted: 2020-12-07 | Resolved: 2024-03-25

## 2024-03-25 PROBLEM — N13.1 HYDRONEPHROSIS WITH URETERAL STRICTURE: Status: RESOLVED | Noted: 2023-05-04 | Resolved: 2024-03-25

## 2024-03-29 ENCOUNTER — TELEMEDICINE (OUTPATIENT)
Age: 64
End: 2024-03-29
Payer: MEDICARE

## 2024-03-29 DIAGNOSIS — N31.9 NEUROGENIC BLADDER: ICD-10-CM

## 2024-03-29 DIAGNOSIS — Z96.0 RETAINED URETERAL STENT: ICD-10-CM

## 2024-03-29 DIAGNOSIS — N39.46 MIXED STRESS AND URGE URINARY INCONTINENCE: ICD-10-CM

## 2024-03-29 DIAGNOSIS — N13.5 URETERAL STRICTURE: Primary | ICD-10-CM

## 2024-03-29 DIAGNOSIS — N18.5 STAGE 5 CHRONIC KIDNEY DISEASE (HCC): ICD-10-CM

## 2024-03-29 PROCEDURE — G8419 CALC BMI OUT NRM PARAM NOF/U: HCPCS | Performed by: UROLOGY

## 2024-03-29 PROCEDURE — 3017F COLORECTAL CA SCREEN DOC REV: CPT | Performed by: UROLOGY

## 2024-03-29 PROCEDURE — 99213 OFFICE O/P EST LOW 20 MIN: CPT | Performed by: UROLOGY

## 2024-03-29 PROCEDURE — G8484 FLU IMMUNIZE NO ADMIN: HCPCS | Performed by: UROLOGY

## 2024-03-29 PROCEDURE — G8427 DOCREV CUR MEDS BY ELIG CLIN: HCPCS | Performed by: UROLOGY

## 2024-03-29 PROCEDURE — 1036F TOBACCO NON-USER: CPT | Performed by: UROLOGY

## 2024-03-29 NOTE — PROGRESS NOTES
Chief Complaint   Patient presents with    Post-Op Check     1. Have you been to the ER, urgent care clinic since your last visit?  Hospitalized since your last visit?No    2. Have you seen or consulted any other health care providers outside of the [facility] since your last visit?  Include any pap smears or colon screening. No    
Units 3/5/24 1202 9/26/23 0940 9/6/22 1132 5/2/22 0816 2/27/22 0753 12/16/21 1130 8/3/20 0000   POC Sodium  136 - 145 mmol/L 141         POC Potassium  3.5 - 5.5 mmol/L 5.5         POC Ionized Calcium  1.12 - 1.32 mmol/L 1.14         POC Chloride  98 - 107 MMOL/L 114 High          POC Creatinine  0.6 - 1.3 MG/DL 5.33 High          Anion Gap, POC    7  9       POC Glucose  65 - 100 mg/dL 74   84 R, CM      eGFR, POC  >60 ml/min/1.73m2 9 Low                 ASSESSMENT and PLAN  1. Ureteral stricture  2. Retained ureteral stent  Assessment & Plan:   Her stents were changed 3/5/2024.  Continue 6-month changes.  3. Mixed stress and urge urinary incontinence  Assessment & Plan:   She has good control.  Stable pad usage.   4. Neurogenic bladder  Assessment & Plan:   History of neurogenic bladder and dilated upper tracts.  She is managed with ureteral stents due to kinking and stricturing.  She is status post stent changes 3/5/2024.  She is on tamsulosin.  She should stay on timed voiding.  5. Stage 5 chronic kidney disease (HCC)  Assessment & Plan:  She is stable.  Predialysis.       Return in about 22 weeks (around 8/30/2024).   Reno Garcia MD       Please note that portions of this note was potentially completed with Dragon dictation, the computer voice recognition software.  Quite often unanticipated grammatical, syntax, homophones, and other interpretive errors are inadvertently transcribed by the computer software.  Please disregard these errors.  Please excuse any errors that have escaped final proofreading.  Thank you.

## 2024-03-29 NOTE — ASSESSMENT & PLAN NOTE
History of neurogenic bladder and dilated upper tracts.  She is managed with ureteral stents due to kinking and stricturing.  She is status post stent changes 3/5/2024.  She is on tamsulosin.  She should stay on timed voiding.

## 2024-07-20 ENCOUNTER — HOSPITAL ENCOUNTER (INPATIENT)
Facility: HOSPITAL | Age: 64
LOS: 7 days | Discharge: HOME | DRG: 286 | End: 2024-07-28
Attending: EMERGENCY MEDICINE | Admitting: INTERNAL MEDICINE
Payer: MEDICARE

## 2024-07-20 ENCOUNTER — APPOINTMENT (OUTPATIENT)
Facility: HOSPITAL | Age: 64
DRG: 286 | End: 2024-07-20
Payer: MEDICARE

## 2024-07-20 DIAGNOSIS — N18.6 ESRD (END STAGE RENAL DISEASE) (HCC): Primary | ICD-10-CM

## 2024-07-20 DIAGNOSIS — J90 PLEURAL EFFUSION: ICD-10-CM

## 2024-07-20 DIAGNOSIS — M79.605 PAIN IN LEFT LEG: ICD-10-CM

## 2024-07-20 DIAGNOSIS — I48.19 PERSISTENT ATRIAL FIBRILLATION (HCC): ICD-10-CM

## 2024-07-20 DIAGNOSIS — R06.02 SHORTNESS OF BREATH: ICD-10-CM

## 2024-07-20 DIAGNOSIS — E83.41 HYPERMAGNESEMIA: ICD-10-CM

## 2024-07-20 DIAGNOSIS — I42.9 CARDIOMYOPATHY (HCC): ICD-10-CM

## 2024-07-20 DIAGNOSIS — E87.5 HYPERKALEMIA: ICD-10-CM

## 2024-07-20 DIAGNOSIS — R79.89 ELEVATED TROPONIN: ICD-10-CM

## 2024-07-20 PROCEDURE — 93005 ELECTROCARDIOGRAM TRACING: CPT | Performed by: INTERNAL MEDICINE

## 2024-07-20 PROCEDURE — 80053 COMPREHEN METABOLIC PANEL: CPT

## 2024-07-20 PROCEDURE — 85025 COMPLETE CBC W/AUTO DIFF WBC: CPT

## 2024-07-20 PROCEDURE — 83735 ASSAY OF MAGNESIUM: CPT

## 2024-07-20 PROCEDURE — 99285 EMERGENCY DEPT VISIT HI MDM: CPT

## 2024-07-20 PROCEDURE — 71045 X-RAY EXAM CHEST 1 VIEW: CPT

## 2024-07-20 PROCEDURE — 94761 N-INVAS EAR/PLS OXIMETRY MLT: CPT

## 2024-07-20 PROCEDURE — 84484 ASSAY OF TROPONIN QUANT: CPT

## 2024-07-20 PROCEDURE — 36415 COLL VENOUS BLD VENIPUNCTURE: CPT

## 2024-07-20 ASSESSMENT — PAIN - FUNCTIONAL ASSESSMENT: PAIN_FUNCTIONAL_ASSESSMENT: NONE - DENIES PAIN

## 2024-07-20 ASSESSMENT — LIFESTYLE VARIABLES
HOW MANY STANDARD DRINKS CONTAINING ALCOHOL DO YOU HAVE ON A TYPICAL DAY: PATIENT DOES NOT DRINK
HOW OFTEN DO YOU HAVE A DRINK CONTAINING ALCOHOL: NEVER

## 2024-07-21 ENCOUNTER — APPOINTMENT (OUTPATIENT)
Facility: HOSPITAL | Age: 64
DRG: 286 | End: 2024-07-21
Attending: INTERNAL MEDICINE
Payer: MEDICARE

## 2024-07-21 PROBLEM — N18.5 ACUTE RENAL FAILURE SUPERIMPOSED ON STAGE 5 CHRONIC KIDNEY DISEASE, NOT ON CHRONIC DIALYSIS (HCC): Status: ACTIVE | Noted: 2024-07-21

## 2024-07-21 PROBLEM — N17.9 ACUTE RENAL FAILURE (ARF) (HCC): Status: ACTIVE | Noted: 2024-07-21

## 2024-07-21 PROBLEM — N17.9 ACUTE RENAL FAILURE SUPERIMPOSED ON STAGE 5 CHRONIC KIDNEY DISEASE, NOT ON CHRONIC DIALYSIS (HCC): Status: ACTIVE | Noted: 2024-07-21

## 2024-07-21 PROBLEM — J81.0 ACUTE PULMONARY EDEMA (HCC): Status: ACTIVE | Noted: 2024-07-21

## 2024-07-21 PROBLEM — E87.5 HYPERKALEMIA: Status: ACTIVE | Noted: 2024-07-21

## 2024-07-21 LAB
ALBUMIN SERPL-MCNC: 3.9 G/DL (ref 3.5–5)
ALBUMIN/GLOB SERPL: 1.1 (ref 1.1–2.2)
ALP SERPL-CCNC: 109 U/L (ref 45–117)
ALT SERPL-CCNC: 22 U/L (ref 12–78)
ANION GAP SERPL CALC-SCNC: 10 MMOL/L (ref 5–15)
ANION GAP SERPL CALC-SCNC: 15 MMOL/L (ref 5–15)
APTT PPP: 32.9 SEC (ref 21.2–34.1)
AST SERPL W P-5'-P-CCNC: 13 U/L (ref 15–37)
BASOPHILS # BLD: 0 K/UL (ref 0–0.1)
BASOPHILS NFR BLD: 0 % (ref 0–1)
BILIRUB SERPL-MCNC: 1 MG/DL (ref 0.2–1)
BUN SERPL-MCNC: 58 MG/DL (ref 6–20)
BUN SERPL-MCNC: 60 MG/DL (ref 6–20)
BUN/CREAT SERPL: 10 (ref 12–20)
BUN/CREAT SERPL: 9 (ref 12–20)
CA-I BLD-MCNC: 9.6 MG/DL (ref 8.5–10.1)
CA-I BLD-MCNC: 9.8 MG/DL (ref 8.5–10.1)
CHLORIDE SERPL-SCNC: 103 MMOL/L (ref 97–108)
CHLORIDE SERPL-SCNC: 104 MMOL/L (ref 97–108)
CO2 SERPL-SCNC: 24 MMOL/L (ref 21–32)
CO2 SERPL-SCNC: 25 MMOL/L (ref 21–32)
CREAT SERPL-MCNC: 6.14 MG/DL (ref 0.55–1.02)
CREAT SERPL-MCNC: 6.15 MG/DL (ref 0.55–1.02)
DIFFERENTIAL METHOD BLD: ABNORMAL
ECHO AO ASC DIAM: 2.5 CM
ECHO AO ASCENDING AORTA INDEX: 1.42 CM/M2
ECHO AO ROOT DIAM: 2.5 CM
ECHO AO ROOT INDEX: 1.42 CM/M2
ECHO AV AREA PEAK VELOCITY: 1.5 CM2
ECHO AV AREA VTI: 1.6 CM2
ECHO AV AREA/BSA PEAK VELOCITY: 0.9 CM2/M2
ECHO AV AREA/BSA VTI: 0.9 CM2/M2
ECHO AV MEAN GRADIENT: 3 MMHG
ECHO AV MEAN VELOCITY: 0.8 M/S
ECHO AV PEAK GRADIENT: 5 MMHG
ECHO AV PEAK VELOCITY: 1.2 M/S
ECHO AV VELOCITY RATIO: 0.67
ECHO AV VTI: 17.7 CM
ECHO BSA: 1.8 M2
ECHO LA AREA 4C: 23 CM2
ECHO LA DIAMETER INDEX: 2.56 CM/M2
ECHO LA DIAMETER: 4.5 CM
ECHO LA MAJOR AXIS: 6.9 CM
ECHO LA TO AORTIC ROOT RATIO: 1.8
ECHO LA VOL MOD A4C: 63 ML (ref 22–52)
ECHO LA VOLUME INDEX MOD A4C: 36 ML/M2 (ref 16–34)
ECHO LV EDV A4C: 123 ML
ECHO LV EDV INDEX A4C: 70 ML/M2
ECHO LV EJECTION FRACTION A4C: 16 %
ECHO LV ESV A4C: 103 ML
ECHO LV ESV INDEX A4C: 59 ML/M2
ECHO LV FRACTIONAL SHORTENING: 12 % (ref 28–44)
ECHO LV INTERNAL DIMENSION DIASTOLE INDEX: 2.9 CM/M2
ECHO LV INTERNAL DIMENSION DIASTOLIC: 5.1 CM (ref 3.9–5.3)
ECHO LV INTERNAL DIMENSION SYSTOLIC INDEX: 2.56 CM/M2
ECHO LV INTERNAL DIMENSION SYSTOLIC: 4.5 CM
ECHO LV IVSD: 1 CM (ref 0.6–0.9)
ECHO LV MASS 2D: 200.8 G (ref 67–162)
ECHO LV MASS INDEX 2D: 114.1 G/M2 (ref 43–95)
ECHO LV POSTERIOR WALL DIASTOLIC: 1.1 CM (ref 0.6–0.9)
ECHO LV RELATIVE WALL THICKNESS RATIO: 0.43
ECHO LVOT AREA: 2.3 CM2
ECHO LVOT AV VTI INDEX: 0.69
ECHO LVOT DIAM: 1.7 CM
ECHO LVOT MEAN GRADIENT: 2 MMHG
ECHO LVOT PEAK GRADIENT: 2 MMHG
ECHO LVOT PEAK VELOCITY: 0.8 M/S
ECHO LVOT STROKE VOLUME INDEX: 15.7 ML/M2
ECHO LVOT SV: 27.7 ML
ECHO LVOT VTI: 12.2 CM
ECHO MV A VELOCITY: 0.61 M/S
ECHO MV AREA VTI: 1.1 CM2
ECHO MV E DECELERATION TIME (DT): 118 MS
ECHO MV E VELOCITY: 1.19 M/S
ECHO MV E/A RATIO: 1.95
ECHO MV LVOT VTI INDEX: 1.98
ECHO MV MAX VELOCITY: 1.2 M/S
ECHO MV MEAN GRADIENT: 3 MMHG
ECHO MV MEAN VELOCITY: 0.8 M/S
ECHO MV PEAK GRADIENT: 6 MMHG
ECHO MV REGURGITANT ALIASING (NYQUIST) VELOCITY: 88 CM/S
ECHO MV REGURGITANT PEAK GRADIENT: 104 MMHG
ECHO MV REGURGITANT PEAK VELOCITY: 5.1 M/S
ECHO MV REGURGITANT RADIUS PISA: 0.6 CM
ECHO MV REGURGITANT VTIA: 166 CM
ECHO MV VTI: 24.2 CM
ECHO PULMONARY ARTERY END DIASTOLIC PRESSURE: 15 MMHG
ECHO PV MAX VELOCITY: 0.8 M/S
ECHO PV MEAN GRADIENT: 1 MMHG
ECHO PV MEAN VELOCITY: 0.6 M/S
ECHO PV PEAK GRADIENT: 3 MMHG
ECHO PV REGURGITANT MAX VELOCITY: 1.9 M/S
ECHO PV VTI: 15.2 CM
ECHO RA AREA 4C: 16.8 CM2
ECHO RA END SYSTOLIC VOLUME APICAL 4 CHAMBER INDEX BSA: 26 ML/M2
ECHO RA VOLUME: 46 ML
ECHO RV FREE WALL PEAK S': 12 CM/S
ECHO RV TAPSE: 1.8 CM (ref 1.7–?)
ECHO TV REGURGITANT MAX VELOCITY: 3.55 M/S
ECHO TV REGURGITANT PEAK GRADIENT: 50 MMHG
EOSINOPHIL # BLD: 0 K/UL (ref 0–0.4)
EOSINOPHIL NFR BLD: 5 % (ref 0–7)
ERYTHROCYTE [DISTWIDTH] IN BLOOD BY AUTOMATED COUNT: 18.6 % (ref 11.5–14.5)
ERYTHROCYTE [DISTWIDTH] IN BLOOD BY AUTOMATED COUNT: 18.8 % (ref 11.5–14.5)
GLOBULIN SER CALC-MCNC: 3.6 G/DL (ref 2–4)
GLUCOSE SERPL-MCNC: 130 MG/DL (ref 65–100)
GLUCOSE SERPL-MCNC: 175 MG/DL (ref 65–100)
HCT VFR BLD AUTO: 42.1 % (ref 35–47)
HCT VFR BLD AUTO: 44.2 % (ref 35–47)
HGB BLD-MCNC: 13 G/DL (ref 11.5–16)
HGB BLD-MCNC: 13.3 G/DL (ref 11.5–16)
INR PPP: 1.4 (ref 0.9–1.1)
LYMPHOCYTES NFR BLD: 20 % (ref 12–49)
MAGNESIUM SERPL-MCNC: 3.3 MG/DL (ref 1.6–2.4)
MCH RBC QN AUTO: 23 PG (ref 26–34)
MCH RBC QN AUTO: 23.1 PG (ref 26–34)
MCHC RBC AUTO-ENTMCNC: 30.1 G/DL (ref 30–36.5)
MCHC RBC AUTO-ENTMCNC: 30.9 G/DL (ref 30–36.5)
MCV RBC AUTO: 74.5 FL (ref 80–99)
MCV RBC AUTO: 76.6 FL (ref 80–99)
MONOCYTES NFR BLD: 10 % (ref 5–13)
NEUTS SEG NFR BLD: 65 % (ref 32–75)
NRBC # BLD: 0 K/UL (ref 0–0.01)
NRBC BLD-RTO: 0 PER 100 WBC
PLATELET # BLD AUTO: 169 K/UL (ref 150–400)
PLATELET # BLD AUTO: 197 K/UL (ref 150–400)
PMV BLD AUTO: 10.9 FL (ref 8.9–12.9)
PMV BLD AUTO: 11.4 FL (ref 8.9–12.9)
POTASSIUM SERPL-SCNC: 5 MMOL/L (ref 3.5–5.1)
POTASSIUM SERPL-SCNC: 5.3 MMOL/L (ref 3.5–5.1)
PROT SERPL-MCNC: 7.5 G/DL (ref 6.4–8.2)
PROTHROMBIN TIME: 17.3 SEC (ref 11.9–14.6)
RBC # BLD AUTO: 5.65 M/UL (ref 3.8–5.2)
RBC # BLD AUTO: 5.77 M/UL (ref 3.8–5.2)
SODIUM SERPL-SCNC: 138 MMOL/L (ref 136–145)
SODIUM SERPL-SCNC: 143 MMOL/L (ref 136–145)
THERAPEUTIC RANGE: NORMAL SEC (ref 82–109)
TROPONIN I SERPL HS-MCNC: 183 NG/L (ref 0–51)
TROPONIN I SERPL HS-MCNC: 190 NG/L (ref 0–51)
TSH SERPL DL<=0.05 MIU/L-ACNC: 0.65 UIU/ML (ref 0.36–3.74)
UFH PPP CHRO-ACNC: <0.1 IU/ML
WBC # BLD AUTO: 4.2 K/UL (ref 3.6–11)
WBC # BLD AUTO: 4.4 K/UL (ref 3.6–11)

## 2024-07-21 PROCEDURE — 85610 PROTHROMBIN TIME: CPT

## 2024-07-21 PROCEDURE — 84439 ASSAY OF FREE THYROXINE: CPT

## 2024-07-21 PROCEDURE — 2060000000 HC ICU INTERMEDIATE R&B

## 2024-07-21 PROCEDURE — 2500000003 HC RX 250 WO HCPCS: Performed by: PHYSICIAN ASSISTANT

## 2024-07-21 PROCEDURE — 6360000002 HC RX W HCPCS: Performed by: PHYSICIAN ASSISTANT

## 2024-07-21 PROCEDURE — 85730 THROMBOPLASTIN TIME PARTIAL: CPT

## 2024-07-21 PROCEDURE — 94761 N-INVAS EAR/PLS OXIMETRY MLT: CPT

## 2024-07-21 PROCEDURE — 85520 HEPARIN ASSAY: CPT

## 2024-07-21 PROCEDURE — 93306 TTE W/DOPPLER COMPLETE: CPT

## 2024-07-21 PROCEDURE — 2700000000 HC OXYGEN THERAPY PER DAY

## 2024-07-21 PROCEDURE — 80048 BASIC METABOLIC PNL TOTAL CA: CPT

## 2024-07-21 PROCEDURE — 2580000003 HC RX 258: Performed by: PHYSICIAN ASSISTANT

## 2024-07-21 PROCEDURE — 96374 THER/PROPH/DIAG INJ IV PUSH: CPT

## 2024-07-21 PROCEDURE — 93005 ELECTROCARDIOGRAM TRACING: CPT

## 2024-07-21 PROCEDURE — 36415 COLL VENOUS BLD VENIPUNCTURE: CPT

## 2024-07-21 PROCEDURE — 6370000000 HC RX 637 (ALT 250 FOR IP): Performed by: INTERNAL MEDICINE

## 2024-07-21 PROCEDURE — 6360000002 HC RX W HCPCS: Performed by: INTERNAL MEDICINE

## 2024-07-21 PROCEDURE — 85027 COMPLETE CBC AUTOMATED: CPT

## 2024-07-21 PROCEDURE — 2580000003 HC RX 258: Performed by: INTERNAL MEDICINE

## 2024-07-21 PROCEDURE — 96375 TX/PRO/DX INJ NEW DRUG ADDON: CPT

## 2024-07-21 PROCEDURE — 6360000002 HC RX W HCPCS: Performed by: EMERGENCY MEDICINE

## 2024-07-21 PROCEDURE — 2580000003 HC RX 258: Performed by: EMERGENCY MEDICINE

## 2024-07-21 PROCEDURE — 84443 ASSAY THYROID STIM HORMONE: CPT

## 2024-07-21 RX ORDER — SODIUM CHLORIDE 0.9 % (FLUSH) 0.9 %
5-40 SYRINGE (ML) INJECTION EVERY 12 HOURS SCHEDULED
Status: DISCONTINUED | OUTPATIENT
Start: 2024-07-21 | End: 2024-07-28 | Stop reason: HOSPADM

## 2024-07-21 RX ORDER — FUROSEMIDE 10 MG/ML
80 INJECTION INTRAMUSCULAR; INTRAVENOUS 2 TIMES DAILY
Status: DISCONTINUED | OUTPATIENT
Start: 2024-07-21 | End: 2024-07-24

## 2024-07-21 RX ORDER — VITAMIN B COMPLEX
1000 TABLET ORAL DAILY
Status: DISCONTINUED | OUTPATIENT
Start: 2024-07-21 | End: 2024-07-28 | Stop reason: HOSPADM

## 2024-07-21 RX ORDER — METOPROLOL TARTRATE 1 MG/ML
5 INJECTION, SOLUTION INTRAVENOUS EVERY 6 HOURS PRN
Status: DISCONTINUED | OUTPATIENT
Start: 2024-07-21 | End: 2024-07-28 | Stop reason: HOSPADM

## 2024-07-21 RX ORDER — SODIUM CHLORIDE 0.9 % (FLUSH) 0.9 %
5-40 SYRINGE (ML) INJECTION PRN
Status: DISCONTINUED | OUTPATIENT
Start: 2024-07-21 | End: 2024-07-28 | Stop reason: HOSPADM

## 2024-07-21 RX ORDER — CALCIUM GLUCONATE 20 MG/ML
1000 INJECTION, SOLUTION INTRAVENOUS
Status: DISCONTINUED | OUTPATIENT
Start: 2024-07-21 | End: 2024-07-21 | Stop reason: CLARIF

## 2024-07-21 RX ORDER — POLYETHYLENE GLYCOL 3350 17 G/17G
17 POWDER, FOR SOLUTION ORAL DAILY PRN
Status: DISCONTINUED | OUTPATIENT
Start: 2024-07-21 | End: 2024-07-28 | Stop reason: HOSPADM

## 2024-07-21 RX ORDER — ACETAMINOPHEN 325 MG/1
650 TABLET ORAL EVERY 6 HOURS PRN
Status: DISCONTINUED | OUTPATIENT
Start: 2024-07-21 | End: 2024-07-28 | Stop reason: HOSPADM

## 2024-07-21 RX ORDER — HEPARIN SODIUM 1000 [USP'U]/ML
2000 INJECTION, SOLUTION INTRAVENOUS; SUBCUTANEOUS PRN
Status: DISCONTINUED | OUTPATIENT
Start: 2024-07-21 | End: 2024-07-28

## 2024-07-21 RX ORDER — HEPARIN SODIUM 1000 [USP'U]/ML
4000 INJECTION, SOLUTION INTRAVENOUS; SUBCUTANEOUS PRN
Status: DISCONTINUED | OUTPATIENT
Start: 2024-07-21 | End: 2024-07-28

## 2024-07-21 RX ORDER — HEPARIN SODIUM 5000 [USP'U]/ML
5000 INJECTION, SOLUTION INTRAVENOUS; SUBCUTANEOUS EVERY 8 HOURS SCHEDULED
Status: DISCONTINUED | OUTPATIENT
Start: 2024-07-21 | End: 2024-07-23

## 2024-07-21 RX ORDER — ONDANSETRON 2 MG/ML
4 INJECTION INTRAMUSCULAR; INTRAVENOUS EVERY 6 HOURS PRN
Status: DISCONTINUED | OUTPATIENT
Start: 2024-07-21 | End: 2024-07-28 | Stop reason: HOSPADM

## 2024-07-21 RX ORDER — SODIUM CHLORIDE 9 MG/ML
INJECTION, SOLUTION INTRAVENOUS PRN
Status: DISCONTINUED | OUTPATIENT
Start: 2024-07-21 | End: 2024-07-28 | Stop reason: HOSPADM

## 2024-07-21 RX ORDER — TIZANIDINE 2 MG/1
2 TABLET ORAL 2 TIMES DAILY PRN
Status: DISCONTINUED | OUTPATIENT
Start: 2024-07-21 | End: 2024-07-28 | Stop reason: HOSPADM

## 2024-07-21 RX ORDER — ONDANSETRON 4 MG/1
4 TABLET, ORALLY DISINTEGRATING ORAL EVERY 8 HOURS PRN
Status: DISCONTINUED | OUTPATIENT
Start: 2024-07-21 | End: 2024-07-28 | Stop reason: HOSPADM

## 2024-07-21 RX ORDER — DOCUSATE SODIUM 100 MG/1
100 CAPSULE, LIQUID FILLED ORAL DAILY
Status: DISCONTINUED | OUTPATIENT
Start: 2024-07-21 | End: 2024-07-28 | Stop reason: HOSPADM

## 2024-07-21 RX ORDER — PREGABALIN 25 MG/1
25 CAPSULE ORAL DAILY
Status: DISCONTINUED | OUTPATIENT
Start: 2024-07-21 | End: 2024-07-28 | Stop reason: HOSPADM

## 2024-07-21 RX ORDER — HEPARIN SODIUM 10000 [USP'U]/100ML
5-30 INJECTION, SOLUTION INTRAVENOUS CONTINUOUS
Status: DISCONTINUED | OUTPATIENT
Start: 2024-07-21 | End: 2024-07-28

## 2024-07-21 RX ORDER — SODIUM BICARBONATE 650 MG/1
650 TABLET ORAL 3 TIMES DAILY
Status: DISPENSED | OUTPATIENT
Start: 2024-07-21 | End: 2024-07-25

## 2024-07-21 RX ORDER — HEPARIN SODIUM 1000 [USP'U]/ML
4000 INJECTION, SOLUTION INTRAVENOUS; SUBCUTANEOUS ONCE
Status: DISCONTINUED | OUTPATIENT
Start: 2024-07-21 | End: 2024-07-21

## 2024-07-21 RX ORDER — BUMETANIDE 0.25 MG/ML
2 INJECTION INTRAMUSCULAR; INTRAVENOUS
Status: COMPLETED | OUTPATIENT
Start: 2024-07-21 | End: 2024-07-21

## 2024-07-21 RX ORDER — ACETAMINOPHEN 650 MG/1
650 SUPPOSITORY RECTAL EVERY 6 HOURS PRN
Status: DISCONTINUED | OUTPATIENT
Start: 2024-07-21 | End: 2024-07-28 | Stop reason: HOSPADM

## 2024-07-21 RX ORDER — TAMSULOSIN HYDROCHLORIDE 0.4 MG/1
0.4 CAPSULE ORAL DAILY
Status: DISCONTINUED | OUTPATIENT
Start: 2024-07-21 | End: 2024-07-28 | Stop reason: HOSPADM

## 2024-07-21 RX ADMIN — AMIODARONE HYDROCHLORIDE 1 MG/MIN: 1.8 INJECTION, SOLUTION INTRAVENOUS at 18:55

## 2024-07-21 RX ADMIN — SODIUM CHLORIDE, PRESERVATIVE FREE 10 ML: 5 INJECTION INTRAVENOUS at 07:56

## 2024-07-21 RX ADMIN — SODIUM BICARBONATE 650 MG: 650 TABLET ORAL at 07:55

## 2024-07-21 RX ADMIN — CALCIUM GLUCONATE 1000 MG: 98 INJECTION, SOLUTION INTRAVENOUS at 01:13

## 2024-07-21 RX ADMIN — HEPARIN SODIUM 12 UNITS/KG/HR: 10000 INJECTION, SOLUTION INTRAVENOUS at 20:27

## 2024-07-21 RX ADMIN — HEPARIN SODIUM 5000 UNITS: 5000 INJECTION INTRAVENOUS; SUBCUTANEOUS at 05:42

## 2024-07-21 RX ADMIN — AMIODARONE HYDROCHLORIDE 150 MG: 1.5 INJECTION, SOLUTION INTRAVENOUS at 18:44

## 2024-07-21 RX ADMIN — ACETAMINOPHEN 650 MG: 325 TABLET ORAL at 20:36

## 2024-07-21 RX ADMIN — METOPROLOL TARTRATE 5 MG: 5 INJECTION INTRAVENOUS at 18:30

## 2024-07-21 RX ADMIN — BUMETANIDE 2 MG: 0.25 INJECTION INTRAMUSCULAR; INTRAVENOUS at 01:13

## 2024-07-21 RX ADMIN — SODIUM CHLORIDE, PRESERVATIVE FREE 10 ML: 5 INJECTION INTRAVENOUS at 21:32

## 2024-07-21 RX ADMIN — HEPARIN SODIUM 5000 UNITS: 5000 INJECTION INTRAVENOUS; SUBCUTANEOUS at 21:32

## 2024-07-21 RX ADMIN — Medication 1000 UNITS: at 07:55

## 2024-07-21 RX ADMIN — FUROSEMIDE 80 MG: 10 INJECTION, SOLUTION INTRAMUSCULAR; INTRAVENOUS at 10:52

## 2024-07-21 RX ADMIN — HEPARIN SODIUM 5000 UNITS: 5000 INJECTION INTRAVENOUS; SUBCUTANEOUS at 13:45

## 2024-07-21 RX ADMIN — FUROSEMIDE 80 MG: 10 INJECTION, SOLUTION INTRAMUSCULAR; INTRAVENOUS at 17:00

## 2024-07-21 RX ADMIN — SODIUM BICARBONATE 650 MG: 650 TABLET ORAL at 20:35

## 2024-07-21 RX ADMIN — TIZANIDINE 2 MG: 2 TABLET ORAL at 20:36

## 2024-07-21 RX ADMIN — SODIUM BICARBONATE 650 MG: 650 TABLET ORAL at 13:45

## 2024-07-21 RX ADMIN — DOCUSATE SODIUM 100 MG: 100 CAPSULE, LIQUID FILLED ORAL at 07:56

## 2024-07-21 RX ADMIN — TAMSULOSIN HYDROCHLORIDE 0.4 MG: 0.4 CAPSULE ORAL at 07:55

## 2024-07-21 RX ADMIN — PREGABALIN 25 MG: 25 CAPSULE ORAL at 08:30

## 2024-07-21 ASSESSMENT — PAIN DESCRIPTION - LOCATION: LOCATION: SHOULDER

## 2024-07-21 ASSESSMENT — PAIN SCALES - GENERAL: PAINLEVEL_OUTOF10: 8

## 2024-07-21 ASSESSMENT — PAIN DESCRIPTION - DESCRIPTORS: DESCRIPTORS: ACHING

## 2024-07-21 NOTE — ED NOTES
..ED TO INPATIENT SBAR HANDOFF    Patient Name: Peter Alan   Preferred Name: Peter  : 1960  64 y.o.   Family/Caregiver Present: no   Code Status Order: Full Code  PO Status: NPO:No  Telemetry Order:   C-SSRS: Risk of Suicide: No Risk  Sitter no   Restraints:     Sepsis Risk Score      Situation  Chief Complaint   Patient presents with    Shortness of Breath    Palpitations     Brief Description of Patient's Condition: Pt alert and oriented x4, uses a wheel chair to move around  Mental Status: oriented, alert, and coherent  Arrived from:Home  Imaging:   XR CHEST PORTABLE   Final Result      Small bilateral pleural effusions with underlying atelectasis and pulmonary   edema.         Electronically signed by BISHOP HIRSCH        Abnormal labs:   Abnormal Labs Reviewed   CBC WITH AUTO DIFFERENTIAL - Abnormal; Notable for the following components:       Result Value    RBC 5.77 (*)     MCV 76.6 (*)     MCH 23.1 (*)     RDW 18.6 (*)     All other components within normal limits   COMPREHENSIVE METABOLIC PANEL - Abnormal; Notable for the following components:    Potassium 5.3 (*)     Glucose 130 (*)     BUN 58 (*)     Creatinine 6.15 (*)     BUN/Creatinine Ratio 9 (*)     Est, Glom Filt Rate 7 (*)     AST 13 (*)     All other components within normal limits   TROPONIN - Abnormal; Notable for the following components:    Troponin, High Sensitivity 190 (*)     All other components within normal limits   MAGNESIUM - Abnormal; Notable for the following components:    Magnesium 3.3 (*)     All other components within normal limits       Background  Allergies:   Allergies   Allergen Reactions    Latex Itching     Per patient: \"just regular, no anaphylaxis\"  Other reaction(s): rash/itching, Unknown  Per patient: \"just regular, no anaphylaxis\"  Per patient: \"just regular, no anaphylaxis\"  Per patient: \"just regular, no anaphylaxis\"  Per patient: \"just regular, no anaphylaxis\"  Per patient: \"just regular, no

## 2024-07-21 NOTE — ED TRIAGE NOTES
Pt c/o shortness of breath and palpitations that has been going on and off for a couple of weeks. No chest pain associated to this symptoms or headache

## 2024-07-21 NOTE — CONSULTS
[place] [address] [address] [place] [id number]                              CONSULTATION      PATIENT NAME: STEPHANE ALMANZA          : 1960  MED REC NO: [id number]                       ROOM: [id number]  ACCOUNT NO: [id number]                       ADMIT DATE: 2024  PROVIDER: Denis Trejo MD    DATE OF SERVICE:  2024    ATTENDING PHYSICIAN:  SILVESTRE DIAZ    REASON FOR CONSULTATION:  Advanced chronic kidney disease.    HISTORY OF PRESENT ILLNESS:  The patient is a delightful 64-year-old woman, who is an office patient of my partner, Dr. Aly Ho.  The patient has stage 5 chronic kidney disease and she has been prepared for eventual initiation of dialysis.  The patient has a left upper arm AV graft placed, which is functional.  The reason for this admission is swelling of lower extremities and shortness of breath.  Reviewing the office notes of Dr. Ho, it became evident, the patient was on Lasix 40 mg twice a day.  The patient states that she has been taking it regularly without missing any doses.  The patient is aware that she may need to start dialysis during this hospitalization.    PAST MEDICAL HISTORY:  Arthritis, urinary retention, CKD, GERD, multiple sclerosis diagnosed in , neurogenic bladder, osteoporosis, ureteral stent placement.    PAST SURGICAL HISTORY:  Placement of AV graft, hysterectomy, urological surgery.    FAMILY HISTORY:  Positive for ovarian cancer and hypertension.    SOCIAL HISTORY:  The patient is a lifetime nonsmoker.  She is a wheelchair bound.  She is a registered nurse, has education and previous employment.    The patient used to work as an emergency room RN.  She denies alcohol, tobacco, or illicit drug abuse.    ALLERGIES:  ALLERGIC TO LATEX, NATALIZUMAB, AND INTERFERON.      MEDICATION LIST AT HOME:  Includes docusate 100 once a day; Lyrica 25, sodium bicarbonate 650 twice a day; tamsulosin

## 2024-07-21 NOTE — SIGNIFICANT EVENT
Rapid response called for atrial fibrillation with RVR and heart rates in the 160 with shortness of breath.  Patient currently oxygen dependent.  She has an EF of 15 to 20% and and chronic kidney disease progressing to end-stage renal disease.  I discussed case with cardiology, Dr. Mccoy who is recommending amiodarone and heparin drip.  Will monitor closely as the patient continues to convert in and out of sinus rhythm and atrial fibrillation.  EKG with atrial fibrillation with a ventricular rate of 143 without ST elevation or depression.

## 2024-07-21 NOTE — H&P
History & Physical    Primary Care Provider: Jaquan Sanchez MD  Source of Information: Patient/family     Chief complaint:   Chief Complaint   Patient presents with    Shortness of Breath    Palpitations        History of Presenting Illness:   Peter Alan is a 64 y.o. female with multiple medical problems including advanced kidney disease stage V currently with AV fistula in place,, Multiple sclerosis, GERD, who presents here to [facility] as a transfer from freestanding emergency department.  Patient notes over the past few weeks she has had increasing shortness of breath and palpitations along with worsening of edema of her lower extremities.  She is currently followed by nephrology Dr. Ho, and according to information provided she has been in contact with his nurse over the past week and they are aware of her declining and were attempting to treat her as an outpatient.  Considering the ongoing worsening of her symptoms she presented to the emergency department for further evaluation.  Upon evaluation in the emergency department laboratory work indicates a worsening of her BUN and creatinine along with a slight elevation in her potassium.  CBC was unremarkable.  She was subsequently sent here to [place] for further evaluation and treatment to include a nephrology consultation.  Currently she is on oxygen and was not at home, there is no documentation of hypoxia in the record but she was noted to be short of breath.  Currently she is resting comfortably in her bed seems a bit short of breath however able to speak in full sentences and states she is feeling better.  She denies any chest pain, nausea, vomiting, diarrhea.  Denies any cough.       Review of Systems:  A comprehensive review of systems was negative except for that written in the History of Present Illness.     Past Medical History:   Diagnosis Date    Arthritis     Burning with urination     Chronic kidney disease     no

## 2024-07-21 NOTE — ED PROVIDER NOTES
[facility] EMERGENCY DEPT  EMERGENCY DEPARTMENT HISTORY AND PHYSICAL EXAM      Date: 7/20/2024  Patient Name: Peter Alan  MRN: [id number]  YOB: 1960  Date of evaluation: 7/20/2024  Provider: Cyndi Iyer MD   Note Started: 11:25 PM EDT 7/20/24    HISTORY OF PRESENT ILLNESS     Chief Complaint   Patient presents with    Shortness of Breath    Palpitations       History Provided By: Patient    HPI: Peter Alan is a 64 y.o. female with end-stage kidney disease not yet on dialysis but has a left upper extremity fistula presents with worsening shortness of breath and palpitations over the past couple weeks along with worsening lower extremity edema.  Patient notes she is on Lasix 40 mg twice a day followed by Dr. Ho, nephrology.  She has been in close contact with his nurse over the past week noting that they are  aware of her decline and working on her potassium with sodium polystyrene per her report.  She endorses having had significant elevation in the past.  She denies any immediate plans for beginning dialysis at this time.  She notes that shortness of breath correlates with the palpitations.  She denies any chest pain.  She has had a history of A-fib in the past.    PAST MEDICAL HISTORY   Past Medical History:  Past Medical History:   Diagnosis Date    Arthritis     Burning with urination     Chronic kidney disease     no dialysis    CKD (chronic kidney disease)     GERD (gastroesophageal reflux disease)     History of blood transfusion     MS (multiple sclerosis) (HCC)     Nonneurogenic neurogenic bladder dysfunction     Osteoporosis     S/P ureteral stent placement        Past Surgical History:  Past Surgical History:   Procedure Laterality Date    ANKLE FRACTURE SURGERY Right     AV FISTULA PLACEMENT  09/05/2023    CYSTOSCOPY  05/04/2023    CYSTOSCOPY Bilateral 3/5/2024    CYSTOURETHROSCOPY AND BILATERAL URETERAL STENT CHANGE performed by Reno Garcia MD at [facility] MAIN OR    FEMUR FRACTURE

## 2024-07-22 LAB
ANION GAP SERPL CALC-SCNC: 8 MMOL/L (ref 5–15)
BASOPHILS # BLD: 0 K/UL (ref 0–0.1)
BASOPHILS NFR BLD: 1 % (ref 0–1)
BUN SERPL-MCNC: 64 MG/DL (ref 6–20)
BUN/CREAT SERPL: 10 (ref 12–20)
CA-I BLD-MCNC: 9.1 MG/DL (ref 8.5–10.1)
CHLORIDE SERPL-SCNC: 103 MMOL/L (ref 97–108)
CO2 SERPL-SCNC: 27 MMOL/L (ref 21–32)
CREAT SERPL-MCNC: 6.41 MG/DL (ref 0.55–1.02)
DIFFERENTIAL METHOD BLD: ABNORMAL
EKG ATRIAL RATE: 94 BPM
EKG DIAGNOSIS: NORMAL
EKG P AXIS: 19 DEGREES
EKG P-R INTERVAL: 202 MS
EKG Q-T INTERVAL: 376 MS
EKG QRS DURATION: 76 MS
EKG QTC CALCULATION (BAZETT): 470 MS
EKG R AXIS: -42 DEGREES
EKG T AXIS: 76 DEGREES
EKG VENTRICULAR RATE: 94 BPM
EOSINOPHIL # BLD: 0 K/UL (ref 0–0.4)
EOSINOPHIL NFR BLD: 0 % (ref 0–7)
ERYTHROCYTE [DISTWIDTH] IN BLOOD BY AUTOMATED COUNT: 17.9 % (ref 11.5–14.5)
GLUCOSE SERPL-MCNC: 116 MG/DL (ref 65–100)
HCT VFR BLD AUTO: 38.3 % (ref 35–47)
HGB BLD-MCNC: 11.7 G/DL (ref 11.5–16)
IMM GRANULOCYTES # BLD AUTO: 0 K/UL (ref 0–0.04)
IMM GRANULOCYTES NFR BLD AUTO: 1 % (ref 0–0.5)
INR PPP: 1.5 (ref 0.9–1.1)
LYMPHOCYTES # BLD: 0.7 K/UL (ref 0.8–3.5)
LYMPHOCYTES NFR BLD: 22 % (ref 12–49)
MCH RBC QN AUTO: 22.7 PG (ref 26–34)
MCHC RBC AUTO-ENTMCNC: 30.5 G/DL (ref 30–36.5)
MCV RBC AUTO: 74.4 FL (ref 80–99)
MONOCYTES # BLD: 0.8 K/UL (ref 0–1)
MONOCYTES NFR BLD: 23 % (ref 5–13)
NEUTS SEG # BLD: 1.8 K/UL (ref 1.8–8)
NEUTS SEG NFR BLD: 53 % (ref 32–75)
NRBC # BLD: 0 K/UL (ref 0–0.01)
NRBC BLD-RTO: 0 PER 100 WBC
PHOSPHATE SERPL-MCNC: 5.3 MG/DL (ref 2.6–4.7)
PLATELET # BLD AUTO: 140 K/UL (ref 150–400)
PMV BLD AUTO: 11.5 FL (ref 8.9–12.9)
POTASSIUM SERPL-SCNC: 5.3 MMOL/L (ref 3.5–5.1)
PROTHROMBIN TIME: 18.6 SEC (ref 11.9–14.6)
RBC # BLD AUTO: 5.15 M/UL (ref 3.8–5.2)
SODIUM SERPL-SCNC: 138 MMOL/L (ref 136–145)
T4 FREE SERPL-MCNC: 1.7 NG/DL (ref 0.8–1.5)
UFH PPP CHRO-ACNC: 0.36 IU/ML
UFH PPP CHRO-ACNC: 0.74 IU/ML
UFH PPP CHRO-ACNC: 0.8 IU/ML
WBC # BLD AUTO: 3.3 K/UL (ref 3.6–11)

## 2024-07-22 PROCEDURE — 84100 ASSAY OF PHOSPHORUS: CPT

## 2024-07-22 PROCEDURE — 6370000000 HC RX 637 (ALT 250 FOR IP): Performed by: INTERNAL MEDICINE

## 2024-07-22 PROCEDURE — 6370000000 HC RX 637 (ALT 250 FOR IP): Performed by: NURSE PRACTITIONER

## 2024-07-22 PROCEDURE — 2580000003 HC RX 258: Performed by: PHYSICIAN ASSISTANT

## 2024-07-22 PROCEDURE — 2500000003 HC RX 250 WO HCPCS: Performed by: PHYSICIAN ASSISTANT

## 2024-07-22 PROCEDURE — 85520 HEPARIN ASSAY: CPT

## 2024-07-22 PROCEDURE — 36415 COLL VENOUS BLD VENIPUNCTURE: CPT

## 2024-07-22 PROCEDURE — 6360000002 HC RX W HCPCS: Performed by: INTERNAL MEDICINE

## 2024-07-22 PROCEDURE — 80074 ACUTE HEPATITIS PANEL: CPT

## 2024-07-22 PROCEDURE — 85025 COMPLETE CBC W/AUTO DIFF WBC: CPT

## 2024-07-22 PROCEDURE — 85610 PROTHROMBIN TIME: CPT

## 2024-07-22 PROCEDURE — 80048 BASIC METABOLIC PNL TOTAL CA: CPT

## 2024-07-22 PROCEDURE — 2060000000 HC ICU INTERMEDIATE R&B

## 2024-07-22 RX ORDER — AMIODARONE HYDROCHLORIDE 200 MG/1
200 TABLET ORAL 2 TIMES DAILY
Status: DISCONTINUED | OUTPATIENT
Start: 2024-07-22 | End: 2024-07-28 | Stop reason: HOSPADM

## 2024-07-22 RX ORDER — NEPHROCAP 1 MG
1 CAP ORAL DAILY
Status: DISCONTINUED | OUTPATIENT
Start: 2024-07-22 | End: 2024-07-28 | Stop reason: HOSPADM

## 2024-07-22 RX ADMIN — PREGABALIN 25 MG: 25 CAPSULE ORAL at 07:10

## 2024-07-22 RX ADMIN — DOCUSATE SODIUM 100 MG: 100 CAPSULE, LIQUID FILLED ORAL at 07:10

## 2024-07-22 RX ADMIN — FUROSEMIDE 80 MG: 10 INJECTION, SOLUTION INTRAMUSCULAR; INTRAVENOUS at 17:18

## 2024-07-22 RX ADMIN — AMIODARONE HYDROCHLORIDE 0.5 MG/MIN: 1.8 INJECTION, SOLUTION INTRAVENOUS at 13:43

## 2024-07-22 RX ADMIN — TAMSULOSIN HYDROCHLORIDE 0.4 MG: 0.4 CAPSULE ORAL at 07:10

## 2024-07-22 RX ADMIN — FUROSEMIDE 80 MG: 10 INJECTION, SOLUTION INTRAMUSCULAR; INTRAVENOUS at 07:10

## 2024-07-22 RX ADMIN — SODIUM BICARBONATE 650 MG: 650 TABLET ORAL at 13:41

## 2024-07-22 RX ADMIN — SODIUM BICARBONATE 650 MG: 650 TABLET ORAL at 07:10

## 2024-07-22 RX ADMIN — SODIUM CHLORIDE, PRESERVATIVE FREE 10 ML: 5 INJECTION INTRAVENOUS at 20:34

## 2024-07-22 RX ADMIN — SODIUM CHLORIDE, PRESERVATIVE FREE 10 ML: 5 INJECTION INTRAVENOUS at 07:10

## 2024-07-22 RX ADMIN — ACETAMINOPHEN 650 MG: 325 TABLET ORAL at 17:19

## 2024-07-22 RX ADMIN — HEPARIN SODIUM 5000 UNITS: 5000 INJECTION INTRAVENOUS; SUBCUTANEOUS at 05:40

## 2024-07-22 RX ADMIN — HEPARIN SODIUM 5000 UNITS: 5000 INJECTION INTRAVENOUS; SUBCUTANEOUS at 20:34

## 2024-07-22 RX ADMIN — Medication 1000 UNITS: at 07:10

## 2024-07-22 RX ADMIN — SODIUM BICARBONATE 650 MG: 650 TABLET ORAL at 20:33

## 2024-07-22 RX ADMIN — Medication 1 MG: at 17:18

## 2024-07-22 RX ADMIN — AMIODARONE HYDROCHLORIDE 0.5 MG/MIN: 1.8 INJECTION, SOLUTION INTRAVENOUS at 01:00

## 2024-07-22 RX ADMIN — AMIODARONE HYDROCHLORIDE 200 MG: 200 TABLET ORAL at 20:33

## 2024-07-22 ASSESSMENT — PAIN SCALES - GENERAL
PAINLEVEL_OUTOF10: 0
PAINLEVEL_OUTOF10: 5

## 2024-07-22 ASSESSMENT — PAIN DESCRIPTION - LOCATION: LOCATION: SHOULDER

## 2024-07-22 ASSESSMENT — PAIN DESCRIPTION - ORIENTATION: ORIENTATION: LEFT

## 2024-07-22 NOTE — CONSULTS
CARDIOLOGY CONSULTATION    REASON FOR CONSULT: New low EF, AF    REQUESTING PROVIDER: Walter Palomo    CHIEF COMPLAINT:  Shortness of breath    HISTORY OF PRESENT ILLNESS:  Peter Alan is a 64 y.o. year-old female with past medical history significant for CKD, MS, GERD who was evaluated today due to new low EF and AF.  She presented to the ED with shortness of breath and swelling.  Found to have new low EF on echo.  She also developed rapid AF, started on amiodarone and converted back to SR.  She reports breathing is much better.  No further palpitations.  She reports an upcoming cardiology appointment but has not been seen prior to this.    Records from hospital admission course thus far reviewed.      Telemetry reviewed .    INPATIENT MEDICATIONS:  Home medications reviewed.    Current Facility-Administered Medications:     docusate sodium (COLACE) capsule 100 mg, 100 mg, Oral, Daily, Lesli Hoffman MD, 100 mg at 07/22/24 0710    pregabalin (LYRICA) capsule 25 mg, 25 mg, Oral, Daily, Lesli Hoffman MD, 25 mg at 07/22/24 0710    sodium bicarbonate tablet 650 mg, 650 mg, Oral, TID, Lesli Hoffmna MD, 650 mg at 07/22/24 1341    tamsulosin (FLOMAX) capsule 0.4 mg, 0.4 mg, Oral, Daily, Lesli Hoffman MD, 0.4 mg at 07/22/24 0710    tiZANidine (ZANAFLEX) tablet 2 mg, 2 mg, Oral, BID PRN, Lesli Hoffman MD, 2 mg at 07/21/24 2036    Vitamin D (CHOLECALCIFEROL) tablet 1,000 Units, 1,000 Units, Oral, Daily, Lesli Hoffman MD, 1,000 Units at 07/22/24 0710    furosemide (LASIX) injection 80 mg, 80 mg, IntraVENous, BID, Lesli Hoffman MD, 80 mg at 07/22/24 0710    sodium chloride flush 0.9 % injection 5-40 mL, 5-40 mL, IntraVENous, 2 times per day, Lesli Hoffman MD, 10 mL at 07/21/24 0756    sodium chloride flush 0.9 % injection 5-40 mL, 5-40 mL, IntraVENous, PRN, Lesli Hoffman MD    0.9 % sodium chloride infusion, , IntraVENous, PRN, Lesli Hoffman MD    heparin (porcine) injection 5,000 Units, 5,000

## 2024-07-22 NOTE — NURSE NAVIGATOR
Heart Failure Nurse Navigator: Heart Failure Education    ** RN-NN discussed the patient with the NP Shauna Bowen. PT should be placed on a fluid restriction.     RN performs hand hygiene. RN Introduces herself to the patient Caregiver and informs the patient of the reasoning for the visit.    Patient Verbalizes Understanding for visit, is accepting of discussion    Persons present for Education: Patient  Time Spent: At least 40 minutes    Method of teaching:  Teach-back, demonstration, verbal, visual    Education Packets Given: Heart Failure symptom management calendar/tracker, AHA HF education booklet, HF magnet    -Confirmation of working scales & that the patient can read numbers  -Confirmation of support to assist with daily weights if patient unable to perform independently.     RN-NN provided education on Daily weights to include same time daily, on same scale, and documenting weights on calendar. RN-NN provided education trigger management/ signs and symptoms of Heart Failure. Patient is advised on “Yellow Days” to call MD office and on “Red Days” to come to the ER or call 911.   RN provides Nutritional education that includes how to calculate and restrict sodium and fluid intake. Encouraged fresh vegetable choice over canned/processed goods. Demonstrated how to log meals/intake. RN discusses lifestyle changes including cessation of smoking and increasing activity level.   In addition, RN discusses the importance of keeping/scheduling appointments and adherence to guideline directed medical therapies.      Patient was able to teach back to the RN-NN the above information.  Patient will need reinforcement of education provided.  Patient advised about the HF helper Sola.

## 2024-07-23 LAB
APTT PPP: >153 SEC (ref 21.2–34.1)
ERYTHROCYTE [DISTWIDTH] IN BLOOD BY AUTOMATED COUNT: 18.4 % (ref 11.5–14.5)
HCT VFR BLD AUTO: 41.3 % (ref 35–47)
HGB BLD-MCNC: 12.7 G/DL (ref 11.5–16)
MCH RBC QN AUTO: 22.6 PG (ref 26–34)
MCHC RBC AUTO-ENTMCNC: 30.8 G/DL (ref 30–36.5)
MCV RBC AUTO: 73.6 FL (ref 80–99)
NRBC # BLD: 0 K/UL (ref 0–0.01)
NRBC BLD-RTO: 0 PER 100 WBC
PLATELET # BLD AUTO: 154 K/UL (ref 150–400)
RBC # BLD AUTO: 5.61 M/UL (ref 3.8–5.2)
THERAPEUTIC RANGE: ABNORMAL SEC (ref 82–109)
UFH PPP CHRO-ACNC: 0.63 IU/ML
WBC # BLD AUTO: 4.7 K/UL (ref 3.6–11)

## 2024-07-23 PROCEDURE — 87340 HEPATITIS B SURFACE AG IA: CPT

## 2024-07-23 PROCEDURE — 6370000000 HC RX 637 (ALT 250 FOR IP): Performed by: NURSE PRACTITIONER

## 2024-07-23 PROCEDURE — 86803 HEPATITIS C AB TEST: CPT

## 2024-07-23 PROCEDURE — 86706 HEP B SURFACE ANTIBODY: CPT

## 2024-07-23 PROCEDURE — 86704 HEP B CORE ANTIBODY TOTAL: CPT

## 2024-07-23 PROCEDURE — 6370000000 HC RX 637 (ALT 250 FOR IP): Performed by: INTERNAL MEDICINE

## 2024-07-23 PROCEDURE — 85730 THROMBOPLASTIN TIME PARTIAL: CPT

## 2024-07-23 PROCEDURE — 36415 COLL VENOUS BLD VENIPUNCTURE: CPT

## 2024-07-23 PROCEDURE — 85027 COMPLETE CBC AUTOMATED: CPT

## 2024-07-23 PROCEDURE — 2580000003 HC RX 258: Performed by: INTERNAL MEDICINE

## 2024-07-23 PROCEDURE — 6360000002 HC RX W HCPCS: Performed by: PHYSICIAN ASSISTANT

## 2024-07-23 PROCEDURE — 90935 HEMODIALYSIS ONE EVALUATION: CPT

## 2024-07-23 PROCEDURE — 2060000000 HC ICU INTERMEDIATE R&B

## 2024-07-23 PROCEDURE — 6360000002 HC RX W HCPCS: Performed by: INTERNAL MEDICINE

## 2024-07-23 PROCEDURE — 85520 HEPARIN ASSAY: CPT

## 2024-07-23 PROCEDURE — 2709999900 HC NON-CHARGEABLE SUPPLY

## 2024-07-23 PROCEDURE — 5A1D70Z PERFORMANCE OF URINARY FILTRATION, INTERMITTENT, LESS THAN 6 HOURS PER DAY: ICD-10-PCS | Performed by: INTERNAL MEDICINE

## 2024-07-23 PROCEDURE — 2580000003 HC RX 258: Performed by: PHYSICIAN ASSISTANT

## 2024-07-23 RX ADMIN — Medication 1000 UNITS: at 10:50

## 2024-07-23 RX ADMIN — PREGABALIN 25 MG: 25 CAPSULE ORAL at 10:50

## 2024-07-23 RX ADMIN — HEPARIN SODIUM 5000 UNITS: 5000 INJECTION INTRAVENOUS; SUBCUTANEOUS at 14:49

## 2024-07-23 RX ADMIN — SODIUM CHLORIDE, PRESERVATIVE FREE 10 ML: 5 INJECTION INTRAVENOUS at 10:51

## 2024-07-23 RX ADMIN — Medication 1 MG: at 10:50

## 2024-07-23 RX ADMIN — TAMSULOSIN HYDROCHLORIDE 0.4 MG: 0.4 CAPSULE ORAL at 10:50

## 2024-07-23 RX ADMIN — AMIODARONE HYDROCHLORIDE 200 MG: 200 TABLET ORAL at 10:50

## 2024-07-23 RX ADMIN — SODIUM BICARBONATE 650 MG: 650 TABLET ORAL at 20:17

## 2024-07-23 RX ADMIN — SODIUM BICARBONATE 650 MG: 650 TABLET ORAL at 10:50

## 2024-07-23 RX ADMIN — SODIUM BICARBONATE 650 MG: 650 TABLET ORAL at 14:49

## 2024-07-23 RX ADMIN — FUROSEMIDE 80 MG: 10 INJECTION, SOLUTION INTRAMUSCULAR; INTRAVENOUS at 17:02

## 2024-07-23 RX ADMIN — HEPARIN SODIUM 5000 UNITS: 5000 INJECTION INTRAVENOUS; SUBCUTANEOUS at 06:15

## 2024-07-23 RX ADMIN — SODIUM CHLORIDE, PRESERVATIVE FREE 10 ML: 5 INJECTION INTRAVENOUS at 22:08

## 2024-07-23 RX ADMIN — DOCUSATE SODIUM 100 MG: 100 CAPSULE, LIQUID FILLED ORAL at 10:50

## 2024-07-23 RX ADMIN — FUROSEMIDE 80 MG: 10 INJECTION, SOLUTION INTRAMUSCULAR; INTRAVENOUS at 10:50

## 2024-07-23 RX ADMIN — HEPARIN SODIUM 11 UNITS/KG/HR: 10000 INJECTION, SOLUTION INTRAVENOUS at 01:54

## 2024-07-23 RX ADMIN — AMIODARONE HYDROCHLORIDE 200 MG: 200 TABLET ORAL at 20:17

## 2024-07-23 ASSESSMENT — PAIN SCALES - GENERAL
PAINLEVEL_OUTOF10: 0
PAINLEVEL_OUTOF10: 0

## 2024-07-23 NOTE — DIALYSIS
Pt kalyani 2.5 hour hemodialysis well.  One liter net fluid removed.  Report called to Micky servin.  Coleen in telemetry notified pt is enroute to her room, box #14

## 2024-07-24 LAB
ANION GAP SERPL CALC-SCNC: 11 MMOL/L (ref 5–15)
APTT PPP: >153 SEC (ref 21.2–34.1)
BUN SERPL-MCNC: 48 MG/DL (ref 6–20)
BUN/CREAT SERPL: 9 (ref 12–20)
CA-I BLD-MCNC: 10 MG/DL (ref 8.5–10.1)
CHLORIDE SERPL-SCNC: 100 MMOL/L (ref 97–108)
CO2 SERPL-SCNC: 27 MMOL/L (ref 21–32)
CREAT SERPL-MCNC: 5.46 MG/DL (ref 0.55–1.02)
EKG ATRIAL RATE: 84 BPM
EKG DIAGNOSIS: NORMAL
EKG P AXIS: 5 DEGREES
EKG P-R INTERVAL: 172 MS
EKG Q-T INTERVAL: 390 MS
EKG QRS DURATION: 78 MS
EKG QTC CALCULATION (BAZETT): 460 MS
EKG R AXIS: -75 DEGREES
EKG T AXIS: 86 DEGREES
EKG VENTRICULAR RATE: 84 BPM
GLUCOSE SERPL-MCNC: 140 MG/DL (ref 65–100)
HBV CORE AB SERPL QL IA: NEGATIVE
HBV SURFACE AB SER QL: NONREACTIVE
HBV SURFACE AB SER-ACNC: 4.33 MIU/ML
HBV SURFACE AG SER QL: <0.1 INDEX
HBV SURFACE AG SER QL: NEGATIVE
HCV AB SER IA-ACNC: 0.08 INDEX
HCV AB SERPL QL IA: NONREACTIVE
POTASSIUM SERPL-SCNC: 4.3 MMOL/L (ref 3.5–5.1)
SODIUM SERPL-SCNC: 138 MMOL/L (ref 136–145)
THERAPEUTIC RANGE: ABNORMAL SEC (ref 82–109)
UFH PPP CHRO-ACNC: 0.39 IU/ML

## 2024-07-24 PROCEDURE — 2709999900 HC NON-CHARGEABLE SUPPLY

## 2024-07-24 PROCEDURE — 6370000000 HC RX 637 (ALT 250 FOR IP): Performed by: NURSE PRACTITIONER

## 2024-07-24 PROCEDURE — 2580000003 HC RX 258: Performed by: PHYSICIAN ASSISTANT

## 2024-07-24 PROCEDURE — 6370000000 HC RX 637 (ALT 250 FOR IP): Performed by: INTERNAL MEDICINE

## 2024-07-24 PROCEDURE — 36415 COLL VENOUS BLD VENIPUNCTURE: CPT

## 2024-07-24 PROCEDURE — 93005 ELECTROCARDIOGRAM TRACING: CPT | Performed by: HOSPITALIST

## 2024-07-24 PROCEDURE — 80048 BASIC METABOLIC PNL TOTAL CA: CPT

## 2024-07-24 PROCEDURE — 2060000000 HC ICU INTERMEDIATE R&B

## 2024-07-24 PROCEDURE — 6370000000 HC RX 637 (ALT 250 FOR IP)

## 2024-07-24 PROCEDURE — 85520 HEPARIN ASSAY: CPT

## 2024-07-24 PROCEDURE — 90935 HEMODIALYSIS ONE EVALUATION: CPT

## 2024-07-24 PROCEDURE — 6360000002 HC RX W HCPCS: Performed by: PHYSICIAN ASSISTANT

## 2024-07-24 PROCEDURE — 6370000000 HC RX 637 (ALT 250 FOR IP): Performed by: HOSPITALIST

## 2024-07-24 PROCEDURE — 85730 THROMBOPLASTIN TIME PARTIAL: CPT

## 2024-07-24 RX ORDER — HYDRALAZINE HYDROCHLORIDE 10 MG/1
10 TABLET, FILM COATED ORAL EVERY 8 HOURS SCHEDULED
Status: DISCONTINUED | OUTPATIENT
Start: 2024-07-24 | End: 2024-07-26

## 2024-07-24 RX ORDER — CARVEDILOL 3.12 MG/1
6.25 TABLET ORAL 2 TIMES DAILY WITH MEALS
Status: DISCONTINUED | OUTPATIENT
Start: 2024-07-24 | End: 2024-07-28 | Stop reason: HOSPADM

## 2024-07-24 RX ORDER — ISOSORBIDE DINITRATE 10 MG/1
10 TABLET ORAL 3 TIMES DAILY
Status: DISCONTINUED | OUTPATIENT
Start: 2024-07-24 | End: 2024-07-26

## 2024-07-24 RX ADMIN — HEPARIN SODIUM 8 UNITS/KG/HR: 10000 INJECTION, SOLUTION INTRAVENOUS at 09:34

## 2024-07-24 RX ADMIN — CARVEDILOL 6.25 MG: 3.12 TABLET, FILM COATED ORAL at 17:22

## 2024-07-24 RX ADMIN — ISOSORBIDE DINITRATE 10 MG: 10 TABLET ORAL at 14:05

## 2024-07-24 RX ADMIN — SODIUM BICARBONATE 650 MG: 650 TABLET ORAL at 14:05

## 2024-07-24 RX ADMIN — TIZANIDINE 2 MG: 2 TABLET ORAL at 03:59

## 2024-07-24 RX ADMIN — NITROGLYCERIN 0.5 INCH: 20 OINTMENT TOPICAL at 03:59

## 2024-07-24 RX ADMIN — ISOSORBIDE DINITRATE 10 MG: 10 TABLET ORAL at 17:22

## 2024-07-24 RX ADMIN — HYDRALAZINE HYDROCHLORIDE 10 MG: 10 TABLET ORAL at 14:04

## 2024-07-24 RX ADMIN — POLYETHYLENE GLYCOL 3350 17 G: 17 POWDER, FOR SOLUTION ORAL at 04:13

## 2024-07-24 RX ADMIN — AMIODARONE HYDROCHLORIDE 200 MG: 200 TABLET ORAL at 21:28

## 2024-07-24 RX ADMIN — HYDRALAZINE HYDROCHLORIDE 10 MG: 10 TABLET ORAL at 21:28

## 2024-07-24 RX ADMIN — SODIUM BICARBONATE 650 MG: 650 TABLET ORAL at 21:28

## 2024-07-24 RX ADMIN — SODIUM CHLORIDE, PRESERVATIVE FREE 10 ML: 5 INJECTION INTRAVENOUS at 21:29

## 2024-07-24 ASSESSMENT — PAIN SCALES - GENERAL
PAINLEVEL_OUTOF10: 0
PAINLEVEL_OUTOF10: 3

## 2024-07-24 ASSESSMENT — PAIN DESCRIPTION - PAIN TYPE: TYPE: ACUTE PAIN

## 2024-07-24 ASSESSMENT — PAIN DESCRIPTION - ONSET: ONSET: ON-GOING

## 2024-07-24 ASSESSMENT — PAIN - FUNCTIONAL ASSESSMENT: PAIN_FUNCTIONAL_ASSESSMENT: PREVENTS OR INTERFERES SOME ACTIVE ACTIVITIES AND ADLS

## 2024-07-24 ASSESSMENT — PAIN DESCRIPTION - ORIENTATION: ORIENTATION: LEFT

## 2024-07-24 ASSESSMENT — PAIN DESCRIPTION - LOCATION: LOCATION: SHOULDER

## 2024-07-24 ASSESSMENT — PAIN DESCRIPTION - DESCRIPTORS: DESCRIPTORS: DISCOMFORT

## 2024-07-24 ASSESSMENT — PAIN DESCRIPTION - FREQUENCY: FREQUENCY: INTERMITTENT

## 2024-07-24 NOTE — CARE COORDINATION
CM reviewed chart.     DCP is home. Patient is set up with [facility] for HD.     Patient is pending heart cath tomorrow.     CM will continue to follow.  
of Conversation: 7/22/2024  Conducted with: Patient with Decision Making Capacity  Other persons present: None    Healthcare Decision Maker: No healthcare decision makers have been documented.  Click here to complete HealthCare Decision Makers including selection of the Healthcare Decision Maker Relationship (ie \"Primary\")       Content/Action Overview:  Has ACP document(s) on file - reflects the patient's care preferences  Reviewed DNR/DNI and patient elects Full Code (Attempt Resuscitation)        Length of Voluntary ACP Conversation in minutes:  <16 minutes (Non-Billable)    Clarita Abdullahi RN             
No

## 2024-07-24 NOTE — DIALYSIS
Pt kalyani 3 hour hemodialysis well.  2 liters net fluid removed.  Dr Gupta saw pt on dialysis.  Report given to Barbara Del Real.    Naya in telemetry notified pt is enroute to her room, box #14

## 2024-07-25 LAB
-: NORMAL
ANION GAP SERPL CALC-SCNC: 6 MMOL/L (ref 5–15)
BUN SERPL-MCNC: 33 MG/DL (ref 6–20)
BUN/CREAT SERPL: 7 (ref 12–20)
CA-I BLD-MCNC: 9 MG/DL (ref 8.5–10.1)
CHLORIDE SERPL-SCNC: 104 MMOL/L (ref 97–108)
CO2 SERPL-SCNC: 28 MMOL/L (ref 21–32)
CREAT SERPL-MCNC: 4.66 MG/DL (ref 0.55–1.02)
ECHO BSA: 1.8 M2
ERYTHROCYTE [DISTWIDTH] IN BLOOD BY AUTOMATED COUNT: 18.4 % (ref 11.5–14.5)
GLUCOSE SERPL-MCNC: 84 MG/DL (ref 65–100)
HAV IGM SER QL: NONREACTIVE
HBV CORE IGM SER QL: NONREACTIVE
HBV SURFACE AG SER QL: <0.1 INDEX
HBV SURFACE AG SER QL: NEGATIVE
HCT VFR BLD AUTO: 39.3 % (ref 35–47)
HCV AB SER IA-ACNC: 0.05 INDEX
HCV AB SERPL QL IA: NONREACTIVE
HGB BLD-MCNC: 12.1 G/DL (ref 11.5–16)
MCH RBC QN AUTO: 22.6 PG (ref 26–34)
MCHC RBC AUTO-ENTMCNC: 30.8 G/DL (ref 30–36.5)
MCV RBC AUTO: 73.3 FL (ref 80–99)
NRBC # BLD: 0 K/UL (ref 0–0.01)
NRBC BLD-RTO: 0 PER 100 WBC
PLATELET # BLD AUTO: 145 K/UL (ref 150–400)
POTASSIUM SERPL-SCNC: 4.3 MMOL/L (ref 3.5–5.1)
RBC # BLD AUTO: 5.36 M/UL (ref 3.8–5.2)
SODIUM SERPL-SCNC: 138 MMOL/L (ref 136–145)
UFH PPP CHRO-ACNC: 0.16 IU/ML
WBC # BLD AUTO: 4.5 K/UL (ref 3.6–11)

## 2024-07-25 PROCEDURE — 76937 US GUIDE VASCULAR ACCESS: CPT | Performed by: INTERNAL MEDICINE

## 2024-07-25 PROCEDURE — 85520 HEPARIN ASSAY: CPT

## 2024-07-25 PROCEDURE — 7100000000 HC PACU RECOVERY - FIRST 15 MIN: Performed by: INTERNAL MEDICINE

## 2024-07-25 PROCEDURE — 36415 COLL VENOUS BLD VENIPUNCTURE: CPT

## 2024-07-25 PROCEDURE — 6360000002 HC RX W HCPCS: Performed by: INTERNAL MEDICINE

## 2024-07-25 PROCEDURE — 93458 L HRT ARTERY/VENTRICLE ANGIO: CPT | Performed by: INTERNAL MEDICINE

## 2024-07-25 PROCEDURE — 2060000000 HC ICU INTERMEDIATE R&B

## 2024-07-25 PROCEDURE — C1769 GUIDE WIRE: HCPCS | Performed by: INTERNAL MEDICINE

## 2024-07-25 PROCEDURE — 6360000004 HC RX CONTRAST MEDICATION: Performed by: INTERNAL MEDICINE

## 2024-07-25 PROCEDURE — C1894 INTRO/SHEATH, NON-LASER: HCPCS | Performed by: INTERNAL MEDICINE

## 2024-07-25 PROCEDURE — 2500000003 HC RX 250 WO HCPCS: Performed by: INTERNAL MEDICINE

## 2024-07-25 PROCEDURE — 6370000000 HC RX 637 (ALT 250 FOR IP): Performed by: NURSE PRACTITIONER

## 2024-07-25 PROCEDURE — 7100000001 HC PACU RECOVERY - ADDTL 15 MIN: Performed by: INTERNAL MEDICINE

## 2024-07-25 PROCEDURE — 85027 COMPLETE CBC AUTOMATED: CPT

## 2024-07-25 PROCEDURE — 2709999900 HC NON-CHARGEABLE SUPPLY: Performed by: INTERNAL MEDICINE

## 2024-07-25 PROCEDURE — 2580000003 HC RX 258: Performed by: INTERNAL MEDICINE

## 2024-07-25 PROCEDURE — 4A023N7 MEASUREMENT OF CARDIAC SAMPLING AND PRESSURE, LEFT HEART, PERCUTANEOUS APPROACH: ICD-10-PCS | Performed by: INTERNAL MEDICINE

## 2024-07-25 PROCEDURE — C1760 CLOSURE DEV, VASC: HCPCS | Performed by: INTERNAL MEDICINE

## 2024-07-25 PROCEDURE — 80048 BASIC METABOLIC PNL TOTAL CA: CPT

## 2024-07-25 PROCEDURE — B2111ZZ FLUOROSCOPY OF MULTIPLE CORONARY ARTERIES USING LOW OSMOLAR CONTRAST: ICD-10-PCS | Performed by: INTERNAL MEDICINE

## 2024-07-25 PROCEDURE — 6370000000 HC RX 637 (ALT 250 FOR IP)

## 2024-07-25 PROCEDURE — 99152 MOD SED SAME PHYS/QHP 5/>YRS: CPT | Performed by: INTERNAL MEDICINE

## 2024-07-25 RX ORDER — HEPARIN SODIUM 200 [USP'U]/100ML
INJECTION, SOLUTION INTRAVENOUS CONTINUOUS PRN
Status: COMPLETED | OUTPATIENT
Start: 2024-07-25 | End: 2024-07-25

## 2024-07-25 RX ORDER — FENTANYL CITRATE 50 UG/ML
INJECTION, SOLUTION INTRAMUSCULAR; INTRAVENOUS PRN
Status: DISCONTINUED | OUTPATIENT
Start: 2024-07-25 | End: 2024-07-25 | Stop reason: HOSPADM

## 2024-07-25 RX ORDER — SODIUM CHLORIDE 0.9 % (FLUSH) 0.9 %
5-40 SYRINGE (ML) INJECTION PRN
Status: DISCONTINUED | OUTPATIENT
Start: 2024-07-25 | End: 2024-07-28 | Stop reason: HOSPADM

## 2024-07-25 RX ORDER — MIDAZOLAM HYDROCHLORIDE 1 MG/ML
INJECTION INTRAMUSCULAR; INTRAVENOUS PRN
Status: DISCONTINUED | OUTPATIENT
Start: 2024-07-25 | End: 2024-07-25 | Stop reason: HOSPADM

## 2024-07-25 RX ORDER — LIDOCAINE HYDROCHLORIDE 10 MG/ML
INJECTION, SOLUTION INFILTRATION; PERINEURAL PRN
Status: DISCONTINUED | OUTPATIENT
Start: 2024-07-25 | End: 2024-07-25 | Stop reason: HOSPADM

## 2024-07-25 RX ORDER — SODIUM CHLORIDE 0.9 % (FLUSH) 0.9 %
5-40 SYRINGE (ML) INJECTION EVERY 12 HOURS SCHEDULED
Status: DISCONTINUED | OUTPATIENT
Start: 2024-07-25 | End: 2024-07-28 | Stop reason: HOSPADM

## 2024-07-25 RX ORDER — SODIUM CHLORIDE 9 MG/ML
INJECTION, SOLUTION INTRAVENOUS PRN
Status: DISCONTINUED | OUTPATIENT
Start: 2024-07-25 | End: 2024-07-28 | Stop reason: HOSPADM

## 2024-07-25 RX ORDER — ACETAMINOPHEN 325 MG/1
650 TABLET ORAL EVERY 4 HOURS PRN
Status: DISCONTINUED | OUTPATIENT
Start: 2024-07-25 | End: 2024-07-28 | Stop reason: HOSPADM

## 2024-07-25 RX ADMIN — HYDRALAZINE HYDROCHLORIDE 10 MG: 10 TABLET ORAL at 21:29

## 2024-07-25 RX ADMIN — CARVEDILOL 6.25 MG: 3.12 TABLET, FILM COATED ORAL at 19:06

## 2024-07-25 RX ADMIN — HYDRALAZINE HYDROCHLORIDE 10 MG: 10 TABLET ORAL at 06:24

## 2024-07-25 RX ADMIN — SODIUM CHLORIDE, PRESERVATIVE FREE 5 ML: 5 INJECTION INTRAVENOUS at 21:30

## 2024-07-25 RX ADMIN — ISOSORBIDE DINITRATE 10 MG: 10 TABLET ORAL at 19:06

## 2024-07-25 RX ADMIN — AMIODARONE HYDROCHLORIDE 200 MG: 200 TABLET ORAL at 19:06

## 2024-07-25 ASSESSMENT — PAIN DESCRIPTION - LOCATION: LOCATION: CHEST

## 2024-07-25 ASSESSMENT — PAIN SCALES - GENERAL: PAINLEVEL_OUTOF10: 7

## 2024-07-25 NOTE — DISCHARGE INSTRUCTIONS
Cardiac rehab is a very important way to help you  to feel better and stronger more quickly  and reduce the risks of heart problems in the future.     Cardiac rehabilitation services are provided at:  [facility]  [address], [address]  [place], [place] [address] [phone]    There are also many other locations that provide this service.   You can be referred to any location nearest to  where you live or work to best accommodate your needs    Someone will contact you to schedule an initial assessment to begin cardiac rehabilitation.          Coronary Angiogram: What to Expect at Home  Your Recovery  A coronary angiogram is a test to examine the large blood vessels of your heart (coronary arteries).  The doctor inserted a thin, flexible tube (catheter into a blood vessel in your groin or wrist.  Your groin or wrist may have a bruise and feel sore for a few days after the procedure.  You can do light activities around the house.  But do not do anything strenuous until your doctor says it is ok.  This may be for several days.  This care sheet gives you a general idea about how long it will take for you to recover.  But each person recovers at a different pace.  Follow the steps below to feel better as quickly as possible.    How can you care for yourself at home?    Activity  If the doctor gave you a sedative:  For 24 hours, don't do anything that requires attention to detail, such as going to work, making important decisions, or signing any legal documents.  It takes time for the medicine's effects to completely wear off.  For your safety, do not drive or operate any machinery that could be dangerous.  Wait until the medicine wears off and you can think clearly and react easily.  Do not do any strenuous exercise and do not lift, pull, or push anything heavier than 5 pounds (approximately the weight of 1 gallon of milk) until your doctor says it is ok.  This may be for several

## 2024-07-26 LAB
ANION GAP SERPL CALC-SCNC: 7 MMOL/L (ref 5–15)
BUN SERPL-MCNC: 41 MG/DL (ref 6–20)
BUN/CREAT SERPL: 8 (ref 12–20)
CA-I BLD-MCNC: 9.6 MG/DL (ref 8.5–10.1)
CHLORIDE SERPL-SCNC: 106 MMOL/L (ref 97–108)
CO2 SERPL-SCNC: 26 MMOL/L (ref 21–32)
CREAT SERPL-MCNC: 5.27 MG/DL (ref 0.55–1.02)
EKG ATRIAL RATE: 83 BPM
EKG ATRIAL RATE: 84 BPM
EKG DIAGNOSIS: NORMAL
EKG DIAGNOSIS: NORMAL
EKG P AXIS: 45 DEGREES
EKG P AXIS: 46 DEGREES
EKG P-R INTERVAL: 174 MS
EKG P-R INTERVAL: 176 MS
EKG Q-T INTERVAL: 404 MS
EKG Q-T INTERVAL: 414 MS
EKG QRS DURATION: 80 MS
EKG QRS DURATION: 80 MS
EKG QTC CALCULATION (BAZETT): 477 MS
EKG QTC CALCULATION (BAZETT): 486 MS
EKG R AXIS: -41 DEGREES
EKG R AXIS: -49 DEGREES
EKG T AXIS: 70 DEGREES
EKG T AXIS: 78 DEGREES
EKG VENTRICULAR RATE: 83 BPM
EKG VENTRICULAR RATE: 84 BPM
GLUCOSE SERPL-MCNC: 104 MG/DL (ref 65–100)
MAGNESIUM SERPL-MCNC: 2.5 MG/DL (ref 1.6–2.4)
POTASSIUM SERPL-SCNC: 4.2 MMOL/L (ref 3.5–5.1)
SODIUM SERPL-SCNC: 139 MMOL/L (ref 136–145)
TROPONIN I SERPL HS-MCNC: 77 NG/L (ref 0–51)
UFH PPP CHRO-ACNC: 0.89 IU/ML
UFH PPP CHRO-ACNC: 1.03 IU/ML
UFH PPP CHRO-ACNC: <0.1 IU/ML

## 2024-07-26 PROCEDURE — 6360000002 HC RX W HCPCS: Performed by: PHYSICIAN ASSISTANT

## 2024-07-26 PROCEDURE — 6370000000 HC RX 637 (ALT 250 FOR IP): Performed by: HOSPITALIST

## 2024-07-26 PROCEDURE — 93005 ELECTROCARDIOGRAM TRACING: CPT | Performed by: INTERNAL MEDICINE

## 2024-07-26 PROCEDURE — 6360000002 HC RX W HCPCS: Performed by: HOSPITALIST

## 2024-07-26 PROCEDURE — 6370000000 HC RX 637 (ALT 250 FOR IP): Performed by: INTERNAL MEDICINE

## 2024-07-26 PROCEDURE — 2709999900 HC NON-CHARGEABLE SUPPLY

## 2024-07-26 PROCEDURE — 2580000003 HC RX 258: Performed by: INTERNAL MEDICINE

## 2024-07-26 PROCEDURE — 80048 BASIC METABOLIC PNL TOTAL CA: CPT

## 2024-07-26 PROCEDURE — 90935 HEMODIALYSIS ONE EVALUATION: CPT

## 2024-07-26 PROCEDURE — 83735 ASSAY OF MAGNESIUM: CPT

## 2024-07-26 PROCEDURE — 6370000000 HC RX 637 (ALT 250 FOR IP)

## 2024-07-26 PROCEDURE — 93005 ELECTROCARDIOGRAM TRACING: CPT | Performed by: HOSPITALIST

## 2024-07-26 PROCEDURE — 2060000000 HC ICU INTERMEDIATE R&B

## 2024-07-26 PROCEDURE — 85520 HEPARIN ASSAY: CPT

## 2024-07-26 PROCEDURE — 2500000003 HC RX 250 WO HCPCS: Performed by: NURSE PRACTITIONER

## 2024-07-26 PROCEDURE — 84484 ASSAY OF TROPONIN QUANT: CPT

## 2024-07-26 PROCEDURE — 83521 IG LIGHT CHAINS FREE EACH: CPT

## 2024-07-26 PROCEDURE — 36415 COLL VENOUS BLD VENIPUNCTURE: CPT

## 2024-07-26 PROCEDURE — 6360000002 HC RX W HCPCS: Performed by: INTERNAL MEDICINE

## 2024-07-26 RX ORDER — FUROSEMIDE 40 MG/1
80 TABLET ORAL 2 TIMES DAILY
Status: DISCONTINUED | OUTPATIENT
Start: 2024-07-26 | End: 2024-07-28 | Stop reason: HOSPADM

## 2024-07-26 RX ORDER — MORPHINE SULFATE 2 MG/ML
2 INJECTION, SOLUTION INTRAMUSCULAR; INTRAVENOUS ONCE
Status: DISCONTINUED | OUTPATIENT
Start: 2024-07-26 | End: 2024-07-28 | Stop reason: HOSPADM

## 2024-07-26 RX ORDER — HYDRALAZINE HYDROCHLORIDE 25 MG/1
25 TABLET, FILM COATED ORAL EVERY 8 HOURS SCHEDULED
Status: DISCONTINUED | OUTPATIENT
Start: 2024-07-26 | End: 2024-07-28 | Stop reason: HOSPADM

## 2024-07-26 RX ORDER — ISOSORBIDE MONONITRATE 60 MG/1
60 TABLET, EXTENDED RELEASE ORAL DAILY
Status: DISCONTINUED | OUTPATIENT
Start: 2024-07-26 | End: 2024-07-28 | Stop reason: HOSPADM

## 2024-07-26 RX ORDER — ASPIRIN 81 MG/1
81 TABLET ORAL DAILY
Status: DISCONTINUED | OUTPATIENT
Start: 2024-07-26 | End: 2024-07-28 | Stop reason: HOSPADM

## 2024-07-26 RX ORDER — MORPHINE SULFATE 2 MG/ML
2 INJECTION, SOLUTION INTRAMUSCULAR; INTRAVENOUS ONCE
Status: COMPLETED | OUTPATIENT
Start: 2024-07-26 | End: 2024-07-26

## 2024-07-26 RX ADMIN — SODIUM CHLORIDE, PRESERVATIVE FREE 5 ML: 5 INJECTION INTRAVENOUS at 21:35

## 2024-07-26 RX ADMIN — NITROGLYCERIN 0.5 INCH: 20 OINTMENT TOPICAL at 05:37

## 2024-07-26 RX ADMIN — IRON SUCROSE 100 MG: 20 INJECTION, SOLUTION INTRAVENOUS at 11:16

## 2024-07-26 RX ADMIN — AMIODARONE HYDROCHLORIDE 150 MG: 1.5 INJECTION, SOLUTION INTRAVENOUS at 14:04

## 2024-07-26 RX ADMIN — AMIODARONE HYDROCHLORIDE 1 MG/MIN: 1.8 INJECTION, SOLUTION INTRAVENOUS at 14:22

## 2024-07-26 RX ADMIN — CARVEDILOL 6.25 MG: 3.12 TABLET, FILM COATED ORAL at 17:34

## 2024-07-26 RX ADMIN — HYDRALAZINE HYDROCHLORIDE 10 MG: 10 TABLET ORAL at 05:37

## 2024-07-26 RX ADMIN — ASPIRIN 81 MG: 81 TABLET, COATED ORAL at 14:20

## 2024-07-26 RX ADMIN — HEPARIN SODIUM 2000 UNITS: 1000 INJECTION INTRAVENOUS; SUBCUTANEOUS at 12:41

## 2024-07-26 RX ADMIN — HEPARIN SODIUM 12 UNITS/KG/HR: 10000 INJECTION, SOLUTION INTRAVENOUS at 07:20

## 2024-07-26 RX ADMIN — ISOSORBIDE MONONITRATE 60 MG: 60 TABLET, EXTENDED RELEASE ORAL at 12:12

## 2024-07-26 RX ADMIN — AMIODARONE HYDROCHLORIDE 0.5 MG/MIN: 1.8 INJECTION, SOLUTION INTRAVENOUS at 21:30

## 2024-07-26 RX ADMIN — HEPARIN SODIUM 4000 UNITS: 1000 INJECTION INTRAVENOUS; SUBCUTANEOUS at 06:00

## 2024-07-26 RX ADMIN — NITROGLYCERIN 0.5 INCH: 20 OINTMENT TOPICAL at 00:56

## 2024-07-26 RX ADMIN — MORPHINE SULFATE 2 MG: 2 INJECTION, SOLUTION INTRAMUSCULAR; INTRAVENOUS at 04:07

## 2024-07-26 RX ADMIN — FUROSEMIDE 80 MG: 40 TABLET ORAL at 17:34

## 2024-07-26 RX ADMIN — HYDRALAZINE HYDROCHLORIDE 25 MG: 25 TABLET ORAL at 14:27

## 2024-07-26 RX ADMIN — HEPARIN SODIUM 14 UNITS/KG/HR: 10000 INJECTION, SOLUTION INTRAVENOUS at 12:41

## 2024-07-26 ASSESSMENT — PAIN DESCRIPTION - ORIENTATION
ORIENTATION: LEFT

## 2024-07-26 ASSESSMENT — PAIN DESCRIPTION - DESCRIPTORS
DESCRIPTORS: SHARP

## 2024-07-26 ASSESSMENT — PAIN SCALES - GENERAL
PAINLEVEL_OUTOF10: 0
PAINLEVEL_OUTOF10: 10
PAINLEVEL_OUTOF10: 3
PAINLEVEL_OUTOF10: 10
PAINLEVEL_OUTOF10: 0

## 2024-07-26 ASSESSMENT — PAIN DESCRIPTION - LOCATION
LOCATION: CHEST

## 2024-07-26 NOTE — DIALYSIS
Pt kalyani 3 hour hemodialysis well.  1.1 liters net fluid removed.  Venofer 100mg given IV.  Report called to [facility] servin.  Naya in telemetry notified pt is enroute to her room box #[id number]

## 2024-07-26 NOTE — DIALYSIS
Phone call from Joann in telemetry, [facility] pt is in Af[facility], .  Phone call to Dr Ho, orders received.

## 2024-07-27 LAB
ERYTHROCYTE [DISTWIDTH] IN BLOOD BY AUTOMATED COUNT: 18.2 % (ref 11.5–14.5)
HCT VFR BLD AUTO: 41.1 % (ref 35–47)
HGB BLD-MCNC: 12.8 G/DL (ref 11.5–16)
MCH RBC QN AUTO: 22.5 PG (ref 26–34)
MCHC RBC AUTO-ENTMCNC: 31.1 G/DL (ref 30–36.5)
MCV RBC AUTO: 72.4 FL (ref 80–99)
NRBC # BLD: 0 K/UL (ref 0–0.01)
NRBC BLD-RTO: 0 PER 100 WBC
PLATELET # BLD AUTO: 168 K/UL (ref 150–400)
RBC # BLD AUTO: 5.68 M/UL (ref 3.8–5.2)
UFH PPP CHRO-ACNC: 0.39 IU/ML
UFH PPP CHRO-ACNC: 0.46 IU/ML
WBC # BLD AUTO: 4.6 K/UL (ref 3.6–11)

## 2024-07-27 PROCEDURE — 2500000003 HC RX 250 WO HCPCS: Performed by: NURSE PRACTITIONER

## 2024-07-27 PROCEDURE — 6370000000 HC RX 637 (ALT 250 FOR IP): Performed by: NURSE PRACTITIONER

## 2024-07-27 PROCEDURE — 2580000003 HC RX 258: Performed by: INTERNAL MEDICINE

## 2024-07-27 PROCEDURE — 6370000000 HC RX 637 (ALT 250 FOR IP): Performed by: HOSPITALIST

## 2024-07-27 PROCEDURE — 85027 COMPLETE CBC AUTOMATED: CPT

## 2024-07-27 PROCEDURE — 6370000000 HC RX 637 (ALT 250 FOR IP): Performed by: INTERNAL MEDICINE

## 2024-07-27 PROCEDURE — 6370000000 HC RX 637 (ALT 250 FOR IP)

## 2024-07-27 PROCEDURE — 2580000003 HC RX 258: Performed by: PHYSICIAN ASSISTANT

## 2024-07-27 PROCEDURE — 36415 COLL VENOUS BLD VENIPUNCTURE: CPT

## 2024-07-27 PROCEDURE — 2060000000 HC ICU INTERMEDIATE R&B

## 2024-07-27 PROCEDURE — 6360000002 HC RX W HCPCS: Performed by: PHYSICIAN ASSISTANT

## 2024-07-27 PROCEDURE — 85520 HEPARIN ASSAY: CPT

## 2024-07-27 RX ADMIN — Medication 1 MG: at 09:17

## 2024-07-27 RX ADMIN — ISOSORBIDE MONONITRATE 60 MG: 60 TABLET, EXTENDED RELEASE ORAL at 09:17

## 2024-07-27 RX ADMIN — SODIUM CHLORIDE, PRESERVATIVE FREE 10 ML: 5 INJECTION INTRAVENOUS at 20:50

## 2024-07-27 RX ADMIN — FUROSEMIDE 80 MG: 40 TABLET ORAL at 17:19

## 2024-07-27 RX ADMIN — SODIUM CHLORIDE, PRESERVATIVE FREE 10 ML: 5 INJECTION INTRAVENOUS at 09:00

## 2024-07-27 RX ADMIN — PREGABALIN 25 MG: 25 CAPSULE ORAL at 09:17

## 2024-07-27 RX ADMIN — CARVEDILOL 6.25 MG: 3.12 TABLET, FILM COATED ORAL at 17:19

## 2024-07-27 RX ADMIN — Medication 1000 UNITS: at 09:17

## 2024-07-27 RX ADMIN — HEPARIN SODIUM 12 UNITS/KG/HR: 10000 INJECTION, SOLUTION INTRAVENOUS at 16:08

## 2024-07-27 RX ADMIN — AMIODARONE HYDROCHLORIDE 200 MG: 200 TABLET ORAL at 09:17

## 2024-07-27 RX ADMIN — CARVEDILOL 6.25 MG: 3.12 TABLET, FILM COATED ORAL at 09:17

## 2024-07-27 RX ADMIN — HYDRALAZINE HYDROCHLORIDE 25 MG: 25 TABLET ORAL at 05:39

## 2024-07-27 RX ADMIN — DOCUSATE SODIUM 100 MG: 100 CAPSULE, LIQUID FILLED ORAL at 09:17

## 2024-07-27 RX ADMIN — ASPIRIN 81 MG: 81 TABLET, COATED ORAL at 09:17

## 2024-07-27 RX ADMIN — TAMSULOSIN HYDROCHLORIDE 0.4 MG: 0.4 CAPSULE ORAL at 09:17

## 2024-07-27 RX ADMIN — AMIODARONE HYDROCHLORIDE 200 MG: 200 TABLET ORAL at 20:50

## 2024-07-27 RX ADMIN — AMIODARONE HYDROCHLORIDE 0.5 MG/MIN: 1.8 INJECTION, SOLUTION INTRAVENOUS at 09:16

## 2024-07-27 RX ADMIN — FUROSEMIDE 80 MG: 40 TABLET ORAL at 09:17

## 2024-07-27 RX ADMIN — HYDRALAZINE HYDROCHLORIDE 25 MG: 25 TABLET ORAL at 14:23

## 2024-07-27 ASSESSMENT — PAIN SCALES - GENERAL: PAINLEVEL_OUTOF10: 0

## 2024-07-28 VITALS
RESPIRATION RATE: 18 BRPM | WEIGHT: 156.53 LBS | HEIGHT: 63 IN | TEMPERATURE: 98.6 F | HEART RATE: 73 BPM | SYSTOLIC BLOOD PRESSURE: 113 MMHG | DIASTOLIC BLOOD PRESSURE: 55 MMHG | OXYGEN SATURATION: 96 % | BODY MASS INDEX: 27.73 KG/M2

## 2024-07-28 LAB
ALBUMIN SERPL-MCNC: 3.2 G/DL (ref 3.5–5)
ANION GAP SERPL CALC-SCNC: 9 MMOL/L (ref 5–15)
BUN SERPL-MCNC: 31 MG/DL (ref 6–20)
BUN/CREAT SERPL: 6 (ref 12–20)
CA-I BLD-MCNC: 9.9 MG/DL (ref 8.5–10.1)
CHLORIDE SERPL-SCNC: 103 MMOL/L (ref 97–108)
CO2 SERPL-SCNC: 24 MMOL/L (ref 21–32)
CREAT SERPL-MCNC: 5.1 MG/DL (ref 0.55–1.02)
GLUCOSE SERPL-MCNC: 95 MG/DL (ref 65–100)
PHOSPHATE SERPL-MCNC: 4 MG/DL (ref 2.6–4.7)
POTASSIUM SERPL-SCNC: 3.7 MMOL/L (ref 3.5–5.1)
SODIUM SERPL-SCNC: 136 MMOL/L (ref 136–145)

## 2024-07-28 PROCEDURE — 2580000003 HC RX 258: Performed by: INTERNAL MEDICINE

## 2024-07-28 PROCEDURE — 6370000000 HC RX 637 (ALT 250 FOR IP): Performed by: HOSPITALIST

## 2024-07-28 PROCEDURE — 80069 RENAL FUNCTION PANEL: CPT

## 2024-07-28 PROCEDURE — 6370000000 HC RX 637 (ALT 250 FOR IP)

## 2024-07-28 PROCEDURE — 6370000000 HC RX 637 (ALT 250 FOR IP): Performed by: NURSE PRACTITIONER

## 2024-07-28 PROCEDURE — 6370000000 HC RX 637 (ALT 250 FOR IP): Performed by: INTERNAL MEDICINE

## 2024-07-28 PROCEDURE — 36415 COLL VENOUS BLD VENIPUNCTURE: CPT

## 2024-07-28 PROCEDURE — 2580000003 HC RX 258: Performed by: PHYSICIAN ASSISTANT

## 2024-07-28 RX ORDER — ISOSORBIDE MONONITRATE 60 MG/1
60 TABLET, EXTENDED RELEASE ORAL DAILY
Qty: 30 TABLET | Refills: 3 | Status: SHIPPED | OUTPATIENT
Start: 2024-07-29

## 2024-07-28 RX ORDER — HYDRALAZINE HYDROCHLORIDE 25 MG/1
25 TABLET, FILM COATED ORAL EVERY 8 HOURS SCHEDULED
Qty: 90 TABLET | Refills: 3 | Status: SHIPPED | OUTPATIENT
Start: 2024-07-28

## 2024-07-28 RX ORDER — AMIODARONE HYDROCHLORIDE 200 MG/1
200 TABLET ORAL 2 TIMES DAILY
Qty: 60 TABLET | Refills: 1 | Status: SHIPPED | OUTPATIENT
Start: 2024-07-28 | End: 2024-09-26

## 2024-07-28 RX ORDER — ASPIRIN 81 MG/1
81 TABLET ORAL DAILY
Qty: 30 TABLET | Refills: 3 | Status: SHIPPED | OUTPATIENT
Start: 2024-07-29

## 2024-07-28 RX ORDER — FUROSEMIDE 80 MG
80 TABLET ORAL 2 TIMES DAILY
Qty: 60 TABLET | Refills: 3 | Status: SHIPPED | OUTPATIENT
Start: 2024-07-28

## 2024-07-28 RX ORDER — PREGABALIN 25 MG/1
25 CAPSULE ORAL DAILY
Qty: 30 CAPSULE | Refills: 0 | Status: SHIPPED | OUTPATIENT
Start: 2024-07-29 | End: 2024-08-28

## 2024-07-28 RX ORDER — CARVEDILOL 6.25 MG/1
6.25 TABLET ORAL 2 TIMES DAILY WITH MEALS
Qty: 60 TABLET | Refills: 3 | Status: SHIPPED | OUTPATIENT
Start: 2024-07-28

## 2024-07-28 RX ADMIN — ASPIRIN 81 MG: 81 TABLET, COATED ORAL at 09:00

## 2024-07-28 RX ADMIN — Medication 1 MG: at 08:59

## 2024-07-28 RX ADMIN — SODIUM CHLORIDE, PRESERVATIVE FREE 10 ML: 5 INJECTION INTRAVENOUS at 09:00

## 2024-07-28 RX ADMIN — TAMSULOSIN HYDROCHLORIDE 0.4 MG: 0.4 CAPSULE ORAL at 09:00

## 2024-07-28 RX ADMIN — DOCUSATE SODIUM 100 MG: 100 CAPSULE, LIQUID FILLED ORAL at 09:00

## 2024-07-28 RX ADMIN — Medication 1000 UNITS: at 09:00

## 2024-07-28 RX ADMIN — ISOSORBIDE MONONITRATE 60 MG: 60 TABLET, EXTENDED RELEASE ORAL at 08:59

## 2024-07-28 RX ADMIN — PREGABALIN 25 MG: 25 CAPSULE ORAL at 08:59

## 2024-07-28 RX ADMIN — HYDRALAZINE HYDROCHLORIDE 25 MG: 25 TABLET ORAL at 05:29

## 2024-07-28 RX ADMIN — SODIUM CHLORIDE, PRESERVATIVE FREE 10 ML: 5 INJECTION INTRAVENOUS at 09:01

## 2024-07-28 RX ADMIN — AMIODARONE HYDROCHLORIDE 200 MG: 200 TABLET ORAL at 09:00

## 2024-07-28 RX ADMIN — APIXABAN 5 MG: 5 TABLET, FILM COATED ORAL at 11:31

## 2024-07-28 RX ADMIN — FUROSEMIDE 80 MG: 40 TABLET ORAL at 08:59

## 2024-07-28 RX ADMIN — CARVEDILOL 6.25 MG: 3.12 TABLET, FILM COATED ORAL at 08:59

## 2024-07-28 NOTE — PLAN OF CARE
Problem: Discharge Planning  Goal: Discharge to home or other facility with appropriate resources  7/24/2024 2213 by Breana Pa RN  Outcome: Progressing  7/24/2024 1544 by Lindsey Mathews RN  Outcome: Progressing  Flowsheets (Taken 7/24/2024 0710)  Discharge to home or other facility with appropriate resources:   Identify barriers to discharge with patient and caregiver   Arrange for needed discharge resources and transportation as appropriate   Identify discharge learning needs (meds, wound care, etc)   Arrange for interpreters to assist at discharge as needed   Refer to discharge planning if patient needs post-hospital services based on physician order or complex needs related to functional status, cognitive ability or social support system     Problem: Skin/Tissue Integrity  Goal: Absence of new skin breakdown  Description: 1.  Monitor for areas of redness and/or skin breakdown  2.  Assess vascular access sites hourly  3.  Every 4-6 hours minimum:  Change oxygen saturation probe site  4.  Every 4-6 hours:  If on nasal continuous positive airway pressure, respiratory therapy assess nares and determine need for appliance change or resting period.  7/24/2024 2213 by Breana Pa RN  Outcome: Progressing  7/24/2024 1544 by Lindsey Mathews RN  Outcome: Progressing     Problem: Safety - Adult  Goal: Free from fall injury  7/24/2024 2213 by Breana Pa RN  Outcome: Progressing  7/24/2024 1544 by Lindsey Mathews RN  Outcome: Progressing     Problem: Neurosensory - Adult  Goal: Achieves stable or improved neurological status  7/24/2024 2213 by Breana Pa RN  Outcome: Progressing  Flowsheets (Taken 7/24/2024 2000)  Achieves stable or improved neurological status: Assess for and report changes in neurological status  7/24/2024 1544 by Lindsey Mathews RN  Outcome: Progressing  Flowsheets (Taken 7/24/2024 0710)  Achieves stable or improved neurological status:   Assess for and report changes in neurological 
  Problem: Discharge Planning  Goal: Discharge to home or other facility with appropriate resources  7/27/2024 0811 by Lisa Arthur, RN  Outcome: Progressing  Flowsheets (Taken 7/26/2024 2130 by Hudson Mancera, RN)  Discharge to home or other facility with appropriate resources: Identify barriers to discharge with patient and caregiver  7/26/2024 1949 by Hudson Mancera, RN  Outcome: Progressing  Flowsheets (Taken 7/26/2024 0815 by Nasima Cleary, RN)  Discharge to home or other facility with appropriate resources: Identify barriers to discharge with patient and caregiver     
  Problem: Discharge Planning  Goal: Discharge to home or other facility with appropriate resources  7/28/2024 1047 by Shayne Kenny RN  Outcome: Progressing  7/27/2024 2155 by Treasure Crawley RN  Outcome: Progressing  7/27/2024 2140 by Treasure Crawley RN  Outcome: Progressing  Flowsheets (Taken 7/27/2024 1940)  Discharge to home or other facility with appropriate resources: Identify barriers to discharge with patient and caregiver     Problem: Skin/Tissue Integrity  Goal: Absence of new skin breakdown  Description: 1.  Monitor for areas of redness and/or skin breakdown  2.  Assess vascular access sites hourly  3.  Every 4-6 hours minimum:  Change oxygen saturation probe site  4.  Every 4-6 hours:  If on nasal continuous positive airway pressure, respiratory therapy assess nares and determine need for appliance change or resting period.  7/28/2024 1047 by Shayne Kenny RN  Outcome: Progressing  7/27/2024 2155 by Treasure Crawley RN  Outcome: Progressing  7/27/2024 2140 by Treasure Crawley RN  Outcome: Progressing     Problem: Safety - Adult  Goal: Free from fall injury  7/28/2024 1047 by Shayne Kenny RN  Outcome: Progressing  7/27/2024 2155 by Treasure Crawley RN  Outcome: Progressing  7/27/2024 2140 by Treasure Crawley RN  Outcome: Progressing     Problem: Neurosensory - Adult  Goal: Achieves stable or improved neurological status  7/28/2024 1047 by Shayne Kenny RN  Outcome: Progressing  7/27/2024 2155 by Treasure Crawley RN  Outcome: Progressing  7/27/2024 2140 by Treasure Crawley RN  Outcome: Progressing  Flowsheets (Taken 7/27/2024 1940)  Achieves stable or improved neurological status: Assess for and report changes in neurological status  Goal: Absence of seizures  7/27/2024 2155 by Treasure Crawley RN  Outcome: Progressing  7/27/2024 2140 by Treasure Crawley RN  Outcome: Progressing  Goal: Remains free of injury related to seizures activity  7/28/2024 1047 by 
  Problem: Discharge Planning  Goal: Discharge to home or other facility with appropriate resources  Outcome: Progressing     Problem: Skin/Tissue Integrity  Goal: Absence of new skin breakdown  Description: 1.  Monitor for areas of redness and/or skin breakdown  2.  Assess vascular access sites hourly  3.  Every 4-6 hours minimum:  Change oxygen saturation probe site  4.  Every 4-6 hours:  If on nasal continuous positive airway pressure, respiratory therapy assess nares and determine need for appliance change or resting period.  Outcome: Progressing     Problem: Safety - Adult  Goal: Free from fall injury  Outcome: Progressing     
  Problem: Discharge Planning  Goal: Discharge to home or other facility with appropriate resources  Outcome: Progressing  Flowsheets (Taken 7/23/2024 2003)  Discharge to home or other facility with appropriate resources:   Identify barriers to discharge with patient and caregiver   Arrange for needed discharge resources and transportation as appropriate     Problem: Skin/Tissue Integrity  Goal: Absence of new skin breakdown  Description: 1.  Monitor for areas of redness and/or skin breakdown  2.  Assess vascular access sites hourly  3.  Every 4-6 hours minimum:  Change oxygen saturation probe site  4.  Every 4-6 hours:  If on nasal continuous positive airway pressure, respiratory therapy assess nares and determine need for appliance change or resting period.  Outcome: Progressing     Problem: Safety - Adult  Goal: Free from fall injury  Outcome: Progressing     
  Problem: Discharge Planning  Goal: Discharge to home or other facility with appropriate resources  Outcome: Progressing  Flowsheets (Taken 7/24/2024 0710)  Discharge to home or other facility with appropriate resources:   Identify barriers to discharge with patient and caregiver   Arrange for needed discharge resources and transportation as appropriate   Identify discharge learning needs (meds, wound care, etc)   Arrange for interpreters to assist at discharge as needed   Refer to discharge planning if patient needs post-hospital services based on physician order or complex needs related to functional status, cognitive ability or social support system     Problem: Skin/Tissue Integrity  Goal: Absence of new skin breakdown  Description: 1.  Monitor for areas of redness and/or skin breakdown  2.  Assess vascular access sites hourly  3.  Every 4-6 hours minimum:  Change oxygen saturation probe site  4.  Every 4-6 hours:  If on nasal continuous positive airway pressure, respiratory therapy assess nares and determine need for appliance change or resting period.  Outcome: Progressing     Problem: Safety - Adult  Goal: Free from fall injury  Outcome: Progressing     Problem: Neurosensory - Adult  Goal: Achieves stable or improved neurological status  Outcome: Progressing  Flowsheets (Taken 7/24/2024 0710)  Achieves stable or improved neurological status:   Assess for and report changes in neurological status   Initiate measures to prevent increased intracranial pressure   Maintain blood pressure and fluid volume within ordered parameters to optimize cerebral perfusion and minimize risk of hemorrhage   Monitor temperature, glucose, and sodium. Initiate appropriate interventions as ordered     Problem: Respiratory - Adult  Goal: Achieves optimal ventilation and oxygenation  Outcome: Progressing  Flowsheets (Taken 7/24/2024 0710)  Achieves optimal ventilation and oxygenation:   Assess for changes in respiratory status   
  Problem: Discharge Planning  Goal: Discharge to home or other facility with appropriate resources  Outcome: Progressing  Flowsheets (Taken 7/26/2024 0815 by Nasima Cleary RN)  Discharge to home or other facility with appropriate resources: Identify barriers to discharge with patient and caregiver     Problem: Skin/Tissue Integrity  Goal: Absence of new skin breakdown  Description: 1.  Monitor for areas of redness and/or skin breakdown  2.  Assess vascular access sites hourly  3.  Every 4-6 hours minimum:  Change oxygen saturation probe site  4.  Every 4-6 hours:  If on nasal continuous positive airway pressure, respiratory therapy assess nares and determine need for appliance change or resting period.  Outcome: Progressing     Problem: Safety - Adult  Goal: Free from fall injury  Outcome: Progressing     Problem: Neurosensory - Adult  Goal: Achieves stable or improved neurological status  Outcome: Progressing  Flowsheets (Taken 7/26/2024 0815 by Nasima Cleary RN)  Achieves stable or improved neurological status: Assess for and report changes in neurological status  Goal: Absence of seizures  Outcome: Progressing  Flowsheets (Taken 7/26/2024 0815 by Nasima Cleary RN)  Absence of seizures: Monitor for seizure activity.  If seizure occurs, document type and location of movements and any associated apnea  Goal: Remains free of injury related to seizures activity  Outcome: Progressing  Flowsheets (Taken 7/26/2024 0815 by Nasima Cleary RN)  Remains free of injury related to seizure activity: Maintain airway, patient safety  and administer oxygen as ordered  Goal: Achieves maximal functionality and self care  Outcome: Progressing  Flowsheets (Taken 7/26/2024 0815 by Nasima Cleary RN)  Achieves maximal functionality and self care:   Monitor swallowing and airway patency with patient fatigue and changes in neurological status   Encourage and assist patient to increase activity and self care with guidance 
  Problem: Skin/Tissue Integrity  Goal: Absence of new skin breakdown  7/27/2024 0812 by Lisa Arthur RN  Outcome: Progressing  7/26/2024 1949 by Hudson Mancera, RN  Outcome: Progressing     Problem: Safety - Adult  Goal: Free from fall injury  7/26/2024 1949 by Hudson Mancera, RN  Outcome: Progressing     
  Problem: Skin/Tissue Integrity  Goal: Absence of new skin breakdown  7/27/2024 0814 by Lisa Arthur, RN  Outcome: Progressing  7/27/2024 0812 by Lisa Arthur RN  Outcome: Progressing  7/26/2024 1949 by Hudson Mancera, RN  Outcome: Progressing     
2155 by Treasure Crawley RN  Outcome: Progressing  7/27/2024 2140 by Treasure Crawley RN  Outcome: Progressing     Problem: Cardiovascular - Adult  Goal: Maintains optimal cardiac output and hemodynamic stability  7/27/2024 2155 by Treasure Crawley RN  Outcome: Progressing  7/27/2024 2140 by Treasure Crawley RN  Outcome: Progressing  Goal: Absence of cardiac dysrhythmias or at baseline  Outcome: Progressing     Problem: Skin/Tissue Integrity - Adult  Goal: Skin integrity remains intact  7/27/2024 2155 by Treasure Crawley RN  Outcome: Progressing  7/27/2024 2140 by Treasure Crawley RN  Outcome: Progressing  Flowsheets (Taken 7/27/2024 0800 by Lisa Arthur RN)  Skin Integrity Remains Intact: Monitor for areas of redness and/or skin breakdown  Goal: Incisions, wounds, or drain sites healing without S/S of infection  7/27/2024 2155 by Treasure Crawley RN  Outcome: Progressing  7/27/2024 2140 by Treasure Crawley RN  Outcome: Progressing  Goal: Oral mucous membranes remain intact  7/27/2024 2155 by Treasure Crawley RN  Outcome: Progressing  7/27/2024 2140 by Treasure Crawley RN  Outcome: Progressing     Problem: Musculoskeletal - Adult  Goal: Return mobility to safest level of function  7/27/2024 2155 by Treasure Crawley RN  Outcome: Progressing  7/27/2024 2140 by Treasure Crawley RN  Outcome: Progressing  Flowsheets (Taken 7/27/2024 0800 by Lisa Arthur RN)  Return Mobility to Safest Level of Function: Assess patient stability and activity tolerance for standing, transferring and ambulating with or without assistive devices  Goal: Maintain proper alignment of affected body part  7/27/2024 2155 by Treasure Crawley RN  Outcome: Progressing  7/27/2024 2140 by Treasure Crawley RN  Outcome: Progressing  Goal: Return ADL status to a safe level of function  7/27/2024 2155 by [place], Treasure, RN  Outcome: Progressing  7/27/2024 2140 by Treasure Crawley, RN  Outcome: 
function  Outcome: Progressing  Goal: Maintains adequate nutritional intake  Outcome: Progressing  Goal: Establish and maintain optimal ostomy function  Outcome: Progressing     Problem: Genitourinary - Adult  Goal: Absence of urinary retention  Outcome: Progressing  Goal: Urinary catheter remains patent  Outcome: Progressing     Problem: Infection - Adult  Goal: Absence of infection at discharge  Outcome: Progressing  Goal: Absence of infection during hospitalization  Outcome: Progressing  Goal: Absence of fever/infection during anticipated neutropenic period  Outcome: Progressing     Problem: Metabolic/Fluid and Electrolytes - Adult  Goal: Electrolytes maintained within normal limits  Outcome: Progressing  Goal: Hemodynamic stability and optimal renal function maintained  Outcome: Progressing  Goal: Glucose maintained within prescribed range  Outcome: Progressing     Problem: Hematologic - Adult  Goal: Maintains hematologic stability  Outcome: Progressing     Problem: Pain  Goal: Verbalizes/displays adequate comfort level or baseline comfort level  Outcome: Progressing     
Female

## 2024-07-28 NOTE — CASE COMMUNICATION
Transition of Care Plan:    RUR: 20  Prior Level of Functioning: Independent  Disposition: HOME  If SNF or IPR: Date FOC offered:   Date FOC received:NA   Accepting facility: NA  Date authorization started with reference number: NA  Date authorization received and expires: NA  Follow up appointments: Dialysis on Tuesday  DME needed: NO  Transportation at discharge: family  IM/IMM Medicare/ letter given: IMM given  Is patient a  and connected with [facility]? NA   If yes, was [place] transfer form completed and [facility] notified?   Caregiver Contact: NA  Discharge Caregiver contacted prior to discharge? NA  Care Conference needed? NA  Barriers to discharge:  NO

## 2024-07-28 NOTE — PROGRESS NOTES
CARDIOLOGY PROGRESS NOTE      Patient Name: Peter Alan  Age: 64 y.o.  Gender:female  :1960  MRN: [id number]    Patient seen and examined. This is a patient with a history of CKD, MS, GERD who presented with shortness of breath and swelling now being followed for new onset cardiomyopathy and low EF. Laying flat in bed. Episode of sharp left sided chest pain overnight, relieved with Morphine. No other complaints reported.    Telemetry reviewed.    Pertinent review of systems items noted above, all other systems are negative. Current medications reviewed.    Physical Examination    Allergies   Allergen Reactions    Latex Itching     Per patient: \"just regular, no anaphylaxis\"  Other reaction(s): rash/itching, Unknown  Per patient: \"just regular, no anaphylaxis\"  Per patient: \"just regular, no anaphylaxis\"  Per patient: \"just regular, no anaphylaxis\"  Per patient: \"just regular, no anaphylaxis\"  Per patient: \"just regular, no anaphylaxis\"  Per patient: \"just regular, no anaphylaxis\"  Per patient: \"just regular, no anaphylaxis\"      Natalizumab Hives    Interferon Beta-1a      Other reaction(s): Unknown  Reaction Type: Allergy    Interferon Beta-1b Rash and Hives     Other reaction(s): Unknown  Reaction Type: Allergy     Vitals:    24 1115   BP: (!) 140/81   Pulse: (!) 119   Resp:    Temp: 98.2 °F (36.8 °C)   SpO2:      Vital signs are stable  No apparent distress.  Heart has an irregular rhythm and tachycardic rate.  Lungs are diminished  Abdomen is soft, nontender, normal bowel sounds.  Extremities have + edema  Skin is dry and warm.  Normal affect    Labs reviewed:  Recent Results (from the past 12 hour(s))   Basic Metabolic Panel    Collection Time: 24  4:48 AM   Result Value Ref Range    Sodium 139 136 - 145 mmol/L    Potassium 4.2 3.5 - 5.1 mmol/L    Chloride 106 97 - 108 mmol/L    CO2 26 21 - 32 mmol/L    Anion Gap 7 5 - 15 mmol/L    Glucose 104 (H) 65 - 100 mg/dL    BUN 41 (H) 6 - 
    CARDIOLOGY PROGRESS NOTE      Patient Name: Peter Alan  Age: 64 y.o.  Gender:female  :1960  MRN: [id number]    Patient seen and examined. This is a patient with a history of CKD, MS, GERD who presented with shortness of breath and swelling now being followed for new onset cardiomyopathy and low EF. Seen during HD this morning. Laying relatively flat in bed. Had some chest pain overnight, relieved with nitro paste. No other complaints reported.    Telemetry reviewed, there were no events noted in the past 24 hours.    Pertinent review of systems items noted above, all other systems are negative. Current medications reviewed.    Physical Examination    Allergies   Allergen Reactions    Latex Itching     Per patient: \"just regular, no anaphylaxis\"  Other reaction(s): rash/itching, Unknown  Per patient: \"just regular, no anaphylaxis\"  Per patient: \"just regular, no anaphylaxis\"  Per patient: \"just regular, no anaphylaxis\"  Per patient: \"just regular, no anaphylaxis\"  Per patient: \"just regular, no anaphylaxis\"  Per patient: \"just regular, no anaphylaxis\"  Per patient: \"just regular, no anaphylaxis\"      Natalizumab Hives    Interferon Beta-1a      Other reaction(s): Unknown  Reaction Type: Allergy    Interferon Beta-1b Rash and Hives     Other reaction(s): Unknown  Reaction Type: Allergy     Vitals:    24 1157   BP: 130/74   Pulse: 72   Resp: 18   Temp: 97.9 °F (36.6 °C)   SpO2: 95%     Vital signs are stable  No apparent distress.  Heart has an irregular rhythm and tachycardic rate.  Lungs are diminished  Abdomen is soft, nontender, normal bowel sounds.  Extremities have + edema  Skin is dry and warm.  Normal affect    Labs reviewed:  Recent Results (from the past 12 hour(s))   Basic Metabolic Panel    Collection Time: 24  4:40 AM   Result Value Ref Range    Sodium 138 136 - 145 mmol/L    Potassium 4.3 3.5 - 5.1 mmol/L    Chloride 100 97 - 108 mmol/L    CO2 27 21 - 32 mmol/L    Anion Gap 
    CARDIOLOGY PROGRESS NOTE      Patient Name: Peter Alan  Age: 64 y.o.  Gender:female  :1960  MRN: [id number]    Patient seen and examined. This is a patient with a history of CKD, MS, GERD who presented with shortness of breath and swelling now being followed for new onset cardiomyopathy and low EF. She had first HD session today, feeling better.  Laying relatively flat in bed.  Has some chest cramping at times.   No other complaints reported.    Telemetry reviewed, there were no events noted in the past 24 hours.    Pertinent review of systems items noted above, all other systems are negative. Current medications reviewed.    Physical Examination    Allergies   Allergen Reactions    Latex Itching     Per patient: \"just regular, no anaphylaxis\"  Other reaction(s): rash/itching, Unknown  Per patient: \"just regular, no anaphylaxis\"  Per patient: \"just regular, no anaphylaxis\"  Per patient: \"just regular, no anaphylaxis\"  Per patient: \"just regular, no anaphylaxis\"  Per patient: \"just regular, no anaphylaxis\"  Per patient: \"just regular, no anaphylaxis\"  Per patient: \"just regular, no anaphylaxis\"      Natalizumab Hives    Interferon Beta-1a      Other reaction(s): Unknown  Reaction Type: Allergy    Interferon Beta-1b Rash and Hives     Other reaction(s): Unknown  Reaction Type: Allergy     Vitals:    24 1417   BP: (!) 171/89   Pulse: (!) 105   Resp: 18   Temp: 98.5 °F (36.9 °C)   SpO2: 96%     Vital signs are stable  No apparent distress.  Heart has an irregular rhythm and tachycardic rate.  Lungs are diminished  Abdomen is soft, nontender, normal bowel sounds.  Extremities have + edema  Skin is dry and warm.  Normal affect    Labs reviewed:  No results found for this or any previous visit (from the past 12 hour(s)).     Case discussed with Dr. Mcdermott and our impression and recommendations are as follows:  New onset cardiomyopathy, EF 15-20%  Will need cardiac catheterization to assess for 
    CARDIOLOGY PROGRESS NOTE      Patient Name: Peter Alan  Age: 64 y.o.  Gender:female  :1960  MRN: [id number]    Patient seen and examined. This is a patient with a history of CKD, MS, GERD who presented with shortness of breath and swelling now being followed for new onset cardiomyopathy and low EF. Laying flat in bed. Episode of sharp left sided chest pain overnight, relieved with Morphine. No other complaints reported.    Telemetry reviewed. Today resting in NAD no more chest pain, breathing is stable laying flat.     Pertinent review of systems items noted above, all other systems are negative. Current medications reviewed.    Physical Examination    Allergies   Allergen Reactions    Latex Itching     Per patient: \"just regular, no anaphylaxis\"  Other reaction(s): rash/itching, Unknown  Per patient: \"just regular, no anaphylaxis\"  Per patient: \"just regular, no anaphylaxis\"  Per patient: \"just regular, no anaphylaxis\"  Per patient: \"just regular, no anaphylaxis\"  Per patient: \"just regular, no anaphylaxis\"  Per patient: \"just regular, no anaphylaxis\"  Per patient: \"just regular, no anaphylaxis\"      Natalizumab Hives    Interferon Beta-1a      Other reaction(s): Unknown  Reaction Type: Allergy    Interferon Beta-1b Rash and Hives     Other reaction(s): Unknown  Reaction Type: Allergy     Vitals:    24 0522   BP: 135/78   Pulse: 73   Resp: 20   Temp: 98.8 °F (37.1 °C)   SpO2: 99%     Vital signs are stable  No apparent distress.  Heart has an irregular rhythm and tachycardic rate.  Lungs are diminished  Abdomen is soft, nontender, normal bowel sounds.  Extremities have + edema  Skin is dry and warm.  Normal affect    Labs reviewed:  Recent Results (from the past 12 hour(s))   CBC    Collection Time: 24  5:00 AM   Result Value Ref Range    WBC 4.6 3.6 - 11.0 K/uL    RBC 5.68 (H) 3.80 - 5.20 M/uL    Hemoglobin 12.8 11.5 - 16.0 g/dL    Hematocrit 41.1 35.0 - 47.0 %    MCV 72.4 (L) 80.0 
    CARDIOLOGY PROGRESS NOTE      Patient Name: Peter Alan  Age: 64 y.o.  Gender:female  :1960  MRN: [id number]    Patient seen and examined. This is a patient with a history of CKD, MS, GERD who presented with shortness of breath and swelling now being followed for new onset cardiomyopathy and low EF. Laying flat in bed. Episode of sharp left sided chest pain overnight, relieved with Morphine. No other complaints reported.    Telemetry reviewed. Today resting in NAD no more chest pain, breathing is stable laying flat.   ON amio yesterday- to po today  Stable for dc home from cv perspective, seems to be compensated, back to cv baseline.     Pertinent review of systems items noted above, all other systems are negative. Current medications reviewed.    Physical Examination    Allergies   Allergen Reactions    Latex Itching     Per patient: \"just regular, no anaphylaxis\"  Other reaction(s): rash/itching, Unknown  Per patient: \"just regular, no anaphylaxis\"  Per patient: \"just regular, no anaphylaxis\"  Per patient: \"just regular, no anaphylaxis\"  Per patient: \"just regular, no anaphylaxis\"  Per patient: \"just regular, no anaphylaxis\"  Per patient: \"just regular, no anaphylaxis\"  Per patient: \"just regular, no anaphylaxis\"      Natalizumab Hives    Interferon Beta-1a      Other reaction(s): Unknown  Reaction Type: Allergy    Interferon Beta-1b Rash and Hives     Other reaction(s): Unknown  Reaction Type: Allergy     Vitals:    24 0737   BP: 126/65   Pulse: 76   Resp: 20   Temp: 98.8 °F (37.1 °C)   SpO2: 99%     Vital signs are stable  No apparent distress.  Heart has an irregular rhythm and tachycardic rate.  Lungs are diminished  Abdomen is soft, nontender, normal bowel sounds.  Extremities have + edema  Skin is dry and warm.  Normal affect    Labs reviewed:  Recent Results (from the past 12 hour(s))   Renal Function Panel    Collection Time: 24  7:52 AM   Result Value Ref Range    Sodium 136 
    Hospitalist Progress Note               Daily Progress Note: 7/22/2024      Hospital Day: 3     Chief complaint:   Chief Complaint   Patient presents with    Shortness of Breath    Palpitations        Subjective:   Hospital course to date:  Peter Alan is a 64 y.o. female with multiple medical problems including advanced kidney disease stage V currently with AV fistula in place,, Multiple sclerosis, GERD, who presents here to [facility] as a transfer from freestanding emergency department.  Patient notes over the past few weeks she has had increasing shortness of breath and palpitations along with worsening of edema of her lower extremities.  She is currently followed by nephrology Dr. Ho, and according to information provided she has been in contact with his nurse over the past week and they are aware of her declining and were attempting to treat her as an outpatient.  Considering the ongoing worsening of her symptoms she presented to the emergency department for further evaluation.  Upon evaluation in the emergency department laboratory work indicates a worsening of her BUN and creatinine along with a slight elevation in her potassium.  CBC was unremarkable.  She was subsequently sent here to [place] for further evaluation and treatment to include a nephrology consultation.  Currently she is on oxygen and was not at home, there is no documentation of hypoxia in the record but she was noted to be short of breath.  Currently she is resting comfortably in her bed seems a bit short of breath however able to speak in full sentences and states she is feeling better.  She denies any chest pain, nausea, vomiting, diarrhea.  Denies any cough.     --------  7/22-Patient is seen today for follow-up.  Events of yesterday noted.  Last evening rapid response was called due to the onset of atrial fibrillation with RVR and heart rates in the 160s along with concurrent shortness of breath.  Care plan was 
.Progress Note (Hospitalist, Internal Medicine)  IDENTIFYING INFORMATION   PATIENT:  Peter Alan  MRN:  [id number]  ADMIT DATE: 7/20/2024  TIME OF EVALUATION: 7/24/2024 5:40 PM      HISTORY OF PRESENT ILLNESS   Peter Alan is a 64 y.o. female who presents with       SUBJECTIVE     Shortness of breath improved.  Patient initiated on hemodialysis which she is tolerating.  Next dialysis session tomorrow    MEDICATIONS   Medications Prior to Admission  Medications Prior to Admission: Fingolimod HCl (GILENYA) 0.5 MG CAPS, Take by mouth  magnesium 30 MG tablet, Take 1 tablet by mouth 2 times daily  docusate (COLACE, DULCOLAX) 100 MG CAPS, Take 100 mg by mouth daily  vitamin D 25 MCG (1000 UT) CAPS, Take 500 Units by mouth daily  pregabalin (LYRICA) 100 MG capsule, Take 50 mg by mouth 2 times daily.  sodium bicarbonate 650 MG tablet, Take by mouth 3 times daily  tamsulosin (FLOMAX) 0.4 MG capsule, Take 1 capsule by mouth daily  tiZANidine (ZANAFLEX) 2 MG tablet, Take by mouth 3 times daily    Current Medications  Current Facility-Administered Medications   Medication Dose Route Frequency Provider Last Rate Last Admin    hydrALAZINE (APRESOLINE) tablet 10 mg  10 mg Oral 3 times per day Onel Carvajal APRN - NP   10 mg at 07/24/24 1404    isosorbide dinitrate (ISORDIL) tablet 10 mg  10 mg Oral TID Onel Carvajal APRN - NP   10 mg at 07/24/24 1722    carvedilol (COREG) tablet 6.25 mg  6.25 mg Oral BID  Onel Carvajal APRN - NP   6.25 mg at 07/24/24 1722    amiodarone (CORDARONE) tablet 200 mg  200 mg Oral BID Shauna Bowen APRN - NP   200 mg at 07/23/24 2017    Virt-Caps 1 mg  1 capsule Oral Daily Aly Ho MD   1 mg at 07/23/24 1050    docusate sodium (COLACE) capsule 100 mg  100 mg Oral Daily Lesli Hoffman MD   100 mg at 07/23/24 1050    pregabalin (LYRICA) capsule 25 mg  25 mg Oral Daily Lesli Hoffman MD   25 mg at 07/23/24 1050    sodium bicarbonate tablet 650 mg  650 mg Oral 
4 Eyes Skin Assessment     NAME:  Peter Alan  YOB: 1960  MEDICAL RECORD NUMBER:  [id number]    The patient is being assessed for  Admission    I agree that at least one RN has performed a thorough Head to Toe Skin Assessment on the patient. ALL assessment sites listed below have been assessed.      Areas assessed by both nurses:    Head, Face, Ears, Shoulders, Back, Chest, Arms, Elbows, Hands, Sacrum. Buttock, Coccyx, Ischium, Legs. Feet and Heels, Under Medical Devices , and Other Part        Does the Patient have a Wound? No noted wound(s)       Greg Prevention initiated by RN: Yes  Wound Care Orders initiated by RN: No    Pressure Injury (Stage 3,4, Unstageable, DTI, NWPT, and Complex wounds) if present, place Wound referral order by RN under : No    New Ostomies, if present place, Ostomy referral order under : No     Nurse 1 eSignature: Electronically signed by Breana Pa RN on 7/21/24 at 4:58 AM EDT    **SHARE this note so that the co-signing nurse can place an eSignature**    Nurse 2 eSignature: Electronically signed by Samaria Dong RN on 7/21/24 at 5:01 AM EDT   
@ aound 22:20 patient told RN that dialysis nurse told patient that the pressure bandage on her AVF/AVG on left upper arm had to be removed or it will form hematoma. RN removed dressing carefully and just about when gauze were starting to get off, site started to bleed profusely. RN applied pressure immediately applied new pressure dressing and informed charge nurse.    Charge nurse came to talk with patient and verified that patient was told by dialysis nurse that pressure dressing should come off after 5-6 hours after dialysis.    RN changed patient's gown at around 2300 and bleeding on the site appeared to be undercontrol. Assess for bruits and thrill and bruit and thrill present distal and proximal to dressing site.    Patient stable and not in apparent distress.    Patient monitored accordingly.      00:15, assessed patient. Patient sleeping soundly, stable and not in apparent distress. No new bleeding noted.    Patient monitored.      01:57, assessed Patient. Patient stable and not in respiratory distress. No new or further bleeding noted.    Patient monitored.      05:42, assessed patient. Patient stable and not in distress. Pressure dressing not soaked, no apparent bleeding noted. Informed patient to keep pressure dressing on until can be further assessed by MD. Patient agreed.    Patient monitored.    Endorsed.  
@ around 00:20 patient complained of Chest Pain. Tasia; signs taken and recorded. Hospitalist informed via perfect serve. Orders received. EKG Done and placed in patient's chart. Nitro applied per orders.    Patient resting. Stable and not in any apparent distress.    Patient monitored accordingly.    Endorsed.    
An outpatient cardiac referral has been made to [facility]. Patient's information was forwarded to this facility. Patient will be contacted by this facility to schedule an initial appointment. This referral information has been included in the patient's discharge instructions. Contact information for cardiac rehab facility was provided as well.  
CM reviewed medical record and noted patient is in cardiac cath lab this am. Patient will have hemodialysis later today.     [facility] has accepted the patient for TTS shift 2 11:45am starting 7/30/2024 at 10:45am.  Welcome letter to be faxed to [facility] at [phone].     Patient initiated for cardiac rehab when discharged.     DCP is home with her , patient already has all needed DME. Family will transport to and from dialysis.   
[facility]     Renal Daily Progress Note:     Subjective: Completed first dialysis today.  Did well.  Seen during lunchtime, she was eating her lunch.  No complaints.  Will plan subsequent dialysis in AM.  Has been accepted at [facility]     seen on dialysis, tolerating procedure, will increase UF to 2 liters today. Cardiac cath scheduled for tomorrow. Not SOB, no chest or abd pain. BP stable     cardiac cath today, no significant coronary artery disease.  She did develop new onset atrial fibrillation and this will be evaluated.  Patient seen post catheter around noon.  Resting comfortably in bed.  No complaints.      Review of Systems  Pertinent items are noted in HPI.    Objective:     /84   Pulse (!) 116   Temp 98.5 °F (36.9 °C) (Oral)   Resp 17   Ht 1.6 m (5' 3\")   Wt 71 kg (156 lb 8.4 oz)   SpO2 99%   BMI 27.73 kg/m²   Temp (24hrs), Av.4 °F (36.9 °C), Min:98.2 °F (36.8 °C), Max:98.8 °F (37.1 °C)        Intake/Output Summary (Last 24 hours) at 2024 1858  Last data filed at 2024 1117  Gross per 24 hour   Intake --   Output 1110 ml   Net -1110 ml     Current Facility-Administered Medications   Medication Dose Route Frequency    sodium chloride flush 0.9 % injection 5-40 mL  5-40 mL IntraVENous 2 times per day    sodium chloride flush 0.9 % injection 5-40 mL  5-40 mL IntraVENous PRN    0.9 % sodium chloride infusion   IntraVENous PRN    acetaminophen (TYLENOL) tablet 650 mg  650 mg Oral Q4H PRN    hydrALAZINE (APRESOLINE) tablet 10 mg  10 mg Oral 3 times per day    isosorbide dinitrate (ISORDIL) tablet 10 mg  10 mg Oral TID    carvedilol (COREG) tablet 6.25 mg  6.25 mg Oral BID WC    amiodarone (CORDARONE) tablet 200 mg  200 mg Oral BID    Virt-Caps 1 mg  1 capsule Oral Daily    docusate sodium (COLACE) capsule 100 mg  100 mg Oral Daily    pregabalin (LYRICA) capsule 25 mg  25 mg Oral Daily    tamsulosin (FLOMAX) capsule 0.4 mg  0.4 mg Oral Daily    tiZANidine 
Echo completed. Report to follow.    
Friday pt is scheduled to have iron infusion, pt would like to know if she can get that inpatient.  
I have received the nephrology consultation.  The patient follows with Dr. Ho.  I will switch consult to Dr. Trejo who is covering for Dr. Ho on this weekend.  I have informed Dr. Trejo.  
I was called for an admission for this patient by Dr. Iyer from [facility]. Orders are written but patient was not seen by Tele-Medicine due to national software outage affecting computers.    Patient with known stage V chronic kidney disease with AV graft and consideration for HD by Nephrology in next 6-12 months.    Patient also has Multiple Sclerosis, HTN, Anemia, metabolic acidosis and hyperkalemia. H/o Afib in past.    She presented to our ED with symptoms of shortness of breath and worsening edema.    .94 and HR 94    Labs show K 5.3, CO2 25, Cr 6.15, BUN 58. Troponin .    CXR personally reviewed and shows pulmonary edema and bilateral pleural effusions.    EKG is not available for review, but per Ed Doc shows sinus rhythm HR 94, no ischemic changes.    Nephrology was contacted by Dr. Iyer and recommended Calcium Gluconate, Bumex IV and admission.    A/P:  Acute renal failure on chronic kidney disease stage 5 vs progression of CKD into end stage renal disease. Baseline 4.8-5.7  Patient will be admitted.  IV lasix 80mg BID  C/s nephrology  May likely require HD this admission  Hyperkalemia: has been high in the past.  Received Ca Gluconate in ED.  Continue Na Bicarb.  Recheck BMP in 4 hours.  Pulmonary edema:  Due to worsening renal failure.  No complaint of chest pain.  HS troponin elevated likely due to renal failure, but will trend.  Has not had Echo in EMR, will check one.  Anemia of chronic kidney disease:  HG stable at 13.3  
Patient currently in dialysis.     DCP is home with her  and will start outpatient dialysis on Tuesday July 30, 2024 at [facility] shift 2. Patient will need to report to [facility] at 10:45 am.   CM received welcome letter from Catina at [facility] dialysis and will give to patient.       
Patient currently in hemodialysis for first treatment. CM sent referral for [facility] for outpatient hemodialysis chair when discharged. [facility] currently has a MWF shift 1, start time between 5-6 am or T-Th-Sat shift 2 start time 930-1100 am. CM will check with patient when she returns to the unit for which time she prefers.     All ready info sent to [facility] as requested. Awaiting results of hepatitis labs.   
Patient returned from Cath Lab s/p Cardiac Catherization. Site is Left Femoral Arterial covered with an occlusive dressing. No drainage noted. No edema and No sign of Hematoma at Left Femoral site. VSS except for HR is Sinus Rhythm / Sinus Tachycardia at 113 on the monitor. Patient denies any pain.   
Patient transported down to Dialysis this AM via bed. Patient denied any chest pain at 07:55 AM   
Patient's BP-100/56, HR-64 and she is on sinus rhythm. Patient on Amiodarone drip. Notified Dr. Valentino and ordered to discontinue the amiodarone drip and instead give the amiodarone PO.  
Pt c/o chest pain that she states has been ongoing since admission. EKG performed and placed in chart. Pt already on heparin gtt, nitro paste ordered.    0700  Error in PTT order instead of Anti-Xa, STAT anti-xa placed.  
Received orders earlier to start Amiodarone drip after Bolus given. (See MAR for details).  Patient's HR is currently 80 - 85, trending down from 110 - 120 earlier. Patient denies chest pain at this time.   
[facility]     Renal Daily Progress Note:     Subjective: Completed first dialysis today.  Did well.  Seen during lunchtime, she was eating her lunch.  No complaints.  Will plan subsequent dialysis in AM.  Has been accepted at [facility]     seen on dialysis, tolerating procedure, will increase UF to 2 liters today. Cardiac cath scheduled for tomorrow. Not SOB, no chest or abd pain. BP stable        Review of Systems  Pertinent items are noted in HPI.    Objective:     /64   Pulse 69   Temp 98.4 °F (36.9 °C)   Resp 18   Ht 1.6 m (5' 3\")   Wt 72.1 kg (158 lb 15.2 oz)   SpO2 92%   BMI 28.16 kg/m²   Temp (24hrs), Av.1 °F (36.7 °C), Min:97.6 °F (36.4 °C), Max:98.5 °F (36.9 °C)        Intake/Output Summary (Last 24 hours) at 2024 1010  Last data filed at 2024 0439  Gross per 24 hour   Intake 300 ml   Output 1900 ml   Net -1600 ml     Current Facility-Administered Medications   Medication Dose Route Frequency    amiodarone (CORDARONE) tablet 200 mg  200 mg Oral BID    Virt-Caps 1 mg  1 capsule Oral Daily    docusate sodium (COLACE) capsule 100 mg  100 mg Oral Daily    pregabalin (LYRICA) capsule 25 mg  25 mg Oral Daily    sodium bicarbonate tablet 650 mg  650 mg Oral TID    tamsulosin (FLOMAX) capsule 0.4 mg  0.4 mg Oral Daily    tiZANidine (ZANAFLEX) tablet 2 mg  2 mg Oral BID PRN    Vitamin D (CHOLECALCIFEROL) tablet 1,000 Units  1,000 Units Oral Daily    furosemide (LASIX) injection 80 mg  80 mg IntraVENous BID    sodium chloride flush 0.9 % injection 5-40 mL  5-40 mL IntraVENous 2 times per day    sodium chloride flush 0.9 % injection 5-40 mL  5-40 mL IntraVENous PRN    0.9 % sodium chloride infusion   IntraVENous PRN    ondansetron (ZOFRAN-ODT) disintegrating tablet 4 mg  4 mg Oral Q8H PRN    Or    ondansetron (ZOFRAN) injection 4 mg  4 mg IntraVENous Q6H PRN    polyethylene glycol (GLYCOLAX) packet 17 g  17 g Oral Daily PRN    acetaminophen (TYLENOL) tablet 650 mg  650 
Spiritual Health Assessment/Progress Note  [facility]    Attempted Encounter,  ,  ,      Name: Peter Alan MRN: [id number]    Age: 64 y.o.     Sex: female   Language: English   Roman Catholic: Moravian   Acute renal failure superimposed on stage 5 chronic kidney disease, not on chronic dialysis (HCC)     Date: 7/26/2024            Total Time Calculated: 3 min              Spiritual Assessment began in [facility]        Referral/Consult From: Rounding   Encounter Overview/Reason: Attempted Encounter  Service Provided For: Patient    Marilu, Belief, Meaning:   Patient unable to communicate at this time  Family/Friends No family/friends present      Importance and Influence:  Patient unable to communicate at this time  Family/Friends no family/friends present    Community:  Patient Other: unable to communicate at this time  Family/Friends Other: none    Assessment and Plan of Care:     Patient Interventions include: Other: unable to communicate at this time  Family/Friends Interventions include: Other: none    Patient Plan of Care: Other: unable to communuicate at this time  Family/Friends Plan of Care: Other: none    Electronically signed by Idris Ugaldelain Intern on 7/26/2024 at 4:13 PM   
[facility]     Renal Daily Progress Note:     Subjective: Completed first dialysis today.  Did well.  Seen during lunchtime, she was eating her lunch.  No complaints.  Will plan subsequent dialysis in AM.  Has been accepted at [facility]     seen on dialysis, tolerating procedure, will increase UF to 2 liters today. Cardiac cath scheduled for tomorrow. Not SOB, no chest or abd pain. BP stable     cardiac cath today, no significant coronary artery disease.  She did develop new onset atrial fibrillation and this will be evaluated.  Patient seen post catheter around noon.  Resting comfortably in bed.  No complaints.    2024    Ms. Alan had hemodialysis earlier today.  It was complicated by A-fib with RVR.  The blood flow rate and the fluid removal targets were decreased appropriately.    Ms. Alan reported chest pain.  This is being addressed by the team.  She is awaiting a LORRIE.    Plans were in place for discharge after dialysis today.  However Ms. Alan is not comfortable with this plan.  She has elected to remain in-house.      2024      Chest discomfort has resolved.  Remains on heparin and amiodarone.    Review of Systems  Pertinent items are noted in HPI.    Objective:     /72   Pulse 80   Temp 98.7 °F (37.1 °C) (Oral)   Resp 18   Ht 1.6 m (5' 3\")   Wt 71 kg (156 lb 8.4 oz)   SpO2 99%   BMI 27.73 kg/m²   Temp (24hrs), Av.6 °F (37 °C), Min:97.6 °F (36.4 °C), Max:99 °F (37.2 °C)        Intake/Output Summary (Last 24 hours) at 2024 1727  Last data filed at 2024 1721  Gross per 24 hour   Intake 495 ml   Output 1150 ml   Net -655 ml       Current Facility-Administered Medications   Medication Dose Route Frequency    aspirin EC tablet 81 mg  81 mg Oral Daily    [Held by provider] nitroglycerin (NITRO-BID) 2 % ointment 0.5 inch  0.5 inch Topical 3 times per day    isosorbide mononitrate (IMDUR) extended release tablet 60 mg  60 mg Oral Daily    
         Diarrhea   []         []          Sputum                                                     []         []          Constipation  []         []          SOB/STREETER                                                []         []          Nausea/Vomit  []         []          Abd Pain                                                    []         []          Tolerating PT  []         []          Dysuria                                                      []         []          Tolerating Diet     []        Unable to obtain  ROS due to  []        mental status change  []        sedated   []        intubated    Medications reviewed:  Current Facility-Administered Medications   Medication Dose Route Frequency    amiodarone (CORDARONE) tablet 200 mg  200 mg Oral BID    docusate sodium (COLACE) capsule 100 mg  100 mg Oral Daily    pregabalin (LYRICA) capsule 25 mg  25 mg Oral Daily    sodium bicarbonate tablet 650 mg  650 mg Oral TID    tamsulosin (FLOMAX) capsule 0.4 mg  0.4 mg Oral Daily    tiZANidine (ZANAFLEX) tablet 2 mg  2 mg Oral BID PRN    Vitamin D (CHOLECALCIFEROL) tablet 1,000 Units  1,000 Units Oral Daily    furosemide (LASIX) injection 80 mg  80 mg IntraVENous BID    sodium chloride flush 0.9 % injection 5-40 mL  5-40 mL IntraVENous 2 times per day    sodium chloride flush 0.9 % injection 5-40 mL  5-40 mL IntraVENous PRN    0.9 % sodium chloride infusion   IntraVENous PRN    heparin (porcine) injection 5,000 Units  5,000 Units SubCUTAneous 3 times per day    ondansetron (ZOFRAN-ODT) disintegrating tablet 4 mg  4 mg Oral Q8H PRN    Or    ondansetron (ZOFRAN) injection 4 mg  4 mg IntraVENous Q6H PRN    polyethylene glycol (GLYCOLAX) packet 17 g  17 g Oral Daily PRN    acetaminophen (TYLENOL) tablet 650 mg  650 mg Oral Q6H PRN    Or    acetaminophen (TYLENOL) suppository 650 mg  650 mg Rectal Q6H PRN    metoprolol (LOPRESSOR) injection 5 mg  5 mg IntraVENous Q6H PRN    sodium chloride flush 0.9 % injection 5-40 
(NEXTERONE) 150 mg in dextrose 5% 100 ml  150 mg IntraVENous Once    Followed by    amiodarone (NEXTERONE) 360 mg in dextrose 5% 200 ml  1 mg/min IntraVENous Continuous    Followed by    amiodarone (NEXTERONE) 360 mg in dextrose 5% 200 ml  0.5 mg/min IntraVENous Continuous    sodium chloride flush 0.9 % injection 5-40 mL  5-40 mL IntraVENous 2 times per day    sodium chloride flush 0.9 % injection 5-40 mL  5-40 mL IntraVENous PRN    0.9 % sodium chloride infusion   IntraVENous PRN    acetaminophen (TYLENOL) tablet 650 mg  650 mg Oral Q4H PRN    carvedilol (COREG) tablet 6.25 mg  6.25 mg Oral BID WC    amiodarone (CORDARONE) tablet 200 mg  200 mg Oral BID    Virt-Caps 1 mg  1 capsule Oral Daily    docusate sodium (COLACE) capsule 100 mg  100 mg Oral Daily    pregabalin (LYRICA) capsule 25 mg  25 mg Oral Daily    tamsulosin (FLOMAX) capsule 0.4 mg  0.4 mg Oral Daily    tiZANidine (ZANAFLEX) tablet 2 mg  2 mg Oral BID PRN    Vitamin D (CHOLECALCIFEROL) tablet 1,000 Units  1,000 Units Oral Daily    sodium chloride flush 0.9 % injection 5-40 mL  5-40 mL IntraVENous 2 times per day    sodium chloride flush 0.9 % injection 5-40 mL  5-40 mL IntraVENous PRN    0.9 % sodium chloride infusion   IntraVENous PRN    ondansetron (ZOFRAN-ODT) disintegrating tablet 4 mg  4 mg Oral Q8H PRN    Or    ondansetron (ZOFRAN) injection 4 mg  4 mg IntraVENous Q6H PRN    polyethylene glycol (GLYCOLAX) packet 17 g  17 g Oral Daily PRN    acetaminophen (TYLENOL) tablet 650 mg  650 mg Oral Q6H PRN    Or    acetaminophen (TYLENOL) suppository 650 mg  650 mg Rectal Q6H PRN    metoprolol (LOPRESSOR) injection 5 mg  5 mg IntraVENous Q6H PRN    sodium chloride flush 0.9 % injection 5-40 mL  5-40 mL IntraVENous 2 times per day    sodium chloride flush 0.9 % injection 5-40 mL  5-40 mL IntraVENous PRN    0.9 % sodium chloride infusion   IntraVENous PRN    heparin (porcine) injection 4,000 Units  4,000 Units IntraVENous PRN    heparin (porcine) injection 
cardiomyopathy, EF 15-20%  Cath today with normal coronaries   Diuresis/HD per renal  Strict I&O, daily weights  GDMT with BB, Hydral/Imdur   Severe mitral regurgitation, likely worsened given volume overload.  HD for volume removal. Plan to repeat echo as OP.   Atrial fibrillation, transitioned to oral amiodarone, now back in SR. Wait to start OAC until after cardiac catheterization. Resume heparin gtt at 8pm. Continue BB. Plan for LORRIE/DCCV tomorrow at 11am with Dr. Allen. Please keep NPO after midnight.   CKD, now on HD, per nephrology  Hypertension, blood pressure is stable, continue to trend    Please do not hesitate to call if additional questions arise.    ARIANE BALDERAS, APRN - NP  7/25/2024  
labs/cultures/radiology/procedures:  renal panel in a.m.      Therapeutic:    Second dialysis scheduled for tomorrow.  Apparently scheduled for cardiac catheterization on Thursday  She is set for discharge from a kidney standpoint once her cardiac workup is completed.  She has been accepted to outpatient dialysis: Hermilo Gupta MD    [phone]  
  Component Value Date/Time    HGB 12.1 07/25/2024 01:11 AM    HGB 12.7 07/23/2024 02:17 AM    HGB 11.7 07/22/2024 02:31 AM     Last 3 WBC/ANC  Lab Results   Component Value Date/Time    WBC 4.5 07/25/2024 01:11 AM    WBC 4.7 07/23/2024 02:17 AM    WBC 3.3 07/22/2024 02:31 AM     No components found for: \"GRNLOCTYABS\"  Last 3 Platelets  No results found for: \"PLATELET\"  Chemistry  [unfilled]  [unfilled]  No results found for: \"LDH\"  Coagulation Studies  Lab Results   Component Value Date/Time    INR 1.5 07/22/2024 02:31 AM     Liver Function Studies  Lab Results   Component Value Date/Time    ALT 22 07/20/2024 11:30 PM    AST 13 07/20/2024 11:30 PM    ALKPHOS 109 07/20/2024 11:30 PM    ALBUMIN 3.9 07/20/2024 11:30 PM       Recent Imaging        Relevant labs and imaging reviewed    ASSESSMENT AND PLAN     Advanced kidney disease now progressed to ESRD  Patient initiated on hemodialysis which she is tolerating  Patient being set up for outpatient dialysis  Follow-up with nephrology recommendations on disposition  Dialysis tomorrow after which she can be discharged.    New onset atrial fibrillation with RVR  Currently rate controlled.  Transition to amiodarone and currently in sinus rhythm  Maintain heparin gtt. to be initiated at 8 PM.  Plan for LORRIE/DCCV tomorrow.  N.p.o. after midnight    New onset cardiomyopathy  EF on echo 15 to 20%  Cath today with normal coronaries  Follow-up with cardiology recommendations  Patient with fluid overload which is improving with dialysis    Anemia of chronic disease  H&H stable    Hypertension  No history of hypertension  Blood pressure currently well-controlled    Hyperkalemia  Patient being dialyzed          [facility]  7/25/2024 at 5:32 PM  
temperature 98.7 °F (37.1 °C), temperature source Oral, resp. rate 18, height 1.6 m (5' 3\"), weight 71 kg (156 lb 8.4 oz), SpO2 99 %.    General: Patient is awake, alert, oriented with appropriate mood and affect  HEENT: Atraumatic, normocephalic, pupils equal equally reactive to light.  Mucous membranes moist  Heart: First and second heart sounds present  Lungs: Air entry adequate  Abdomen: Soft, nontender, nondistended.  Bowel sounds present with regular frequency  Genitals: Deferred  Skin:  Extremities:  pedal pulses palpable with good volume.  CNS: Cranial nerves II to XII intact with no focal neurological deficit.      Lines/Drains/Airways/Wounds:  [unfilled]    LABS AND IMAGING   CBC  [unfilled]    Last 3 Hemoglobin  Lab Results   Component Value Date/Time    HGB 12.8 07/27/2024 05:00 AM    HGB 12.1 07/25/2024 01:11 AM    HGB 12.7 07/23/2024 02:17 AM     Last 3 WBC/ANC  Lab Results   Component Value Date/Time    WBC 4.6 07/27/2024 05:00 AM    WBC 4.5 07/25/2024 01:11 AM    WBC 4.7 07/23/2024 02:17 AM     No components found for: \"GRNLOCTYABS\"  Last 3 Platelets  No results found for: \"PLATELET\"  Chemistry  [unfilled]  [unfilled]  No results found for: \"LDH\"  Coagulation Studies  Lab Results   Component Value Date/Time    INR 1.5 07/22/2024 02:31 AM     Liver Function Studies  Lab Results   Component Value Date/Time    ALT 22 07/20/2024 11:30 PM    AST 13 07/20/2024 11:30 PM    ALKPHOS 109 07/20/2024 11:30 PM    ALBUMIN 3.9 07/20/2024 11:30 PM       Recent Imaging        Relevant labs and imaging reviewed    ASSESSMENT AND PLAN     Advanced kidney disease now progressed to ESRD  Patient initiated on hemodialysis which she is tolerating  Patient being set up for outpatient dialysis  Follow-up with nephrology recommendations on disposition    New onset atrial fibrillation with RVR  Currently rate controlled.  Transition to amiodarone and currently in sinus rhythm.  Cardioversion canceled..  Heart rate up to 
adverse drug reaction  [x] Aggressive IV diuresis requiring serial monitoring for renal impairment and electrolyte derangements  [] Critical electrolyte abnormalities requiring IV replacement and close serial monitoring  [] SQ Insulin SS- monitoring serial FSBS for Hypoglycemic adverse drug reaction  [] Other -   [] Change in code status:    [] Decision to escalate care:    [] Major surgery/procedure with associated risk factors:    ----------------------------------------------------------------------  C. Data (any 2)  [] Discussed current management and discharge planning options with Case Management.  [x] Discussed management of the case with:    [x] Telemetry personally reviewed and interpreted as documented above    [] Imaging personally reviewed and interpreted, includes:    [x] Data Review (any 3)  [x] All available Consultant notes from yesterday/today were reviewed  [x] All current labs were reviewed and interpreted for clinical significance   [x] Appropriate follow-up labs were ordered  [] Collateral history obtained from:               Assessment:  Advanced kidney disease stage V with likely progression to ESRD-currently stable patient had her first dialysis session today without any issues  New onset atrial fibrillation with RVR-currently in A-fib  New onset cardiomyopathy-echocardiogram shows EF 15 to 20%  hyperkalemia  Fluid overload-with pulmonary edema, secondary to advanced kidney disease, patient appears clinically improved post dialysis  Anemia of chronic kidney disease-currently hemoglobin is quite good, noted to be 13.3 yesterday  Elevated blood pressure-there is no noted history of blood hypertension the patient's past, this may be renovascular in nature.        Plan:  Continue medications as per nephrology  Continue dialysis  Further recommendations as per nephrology  Avoid nephrotoxic agents  Await cardiology further recommendations regarding treatment of blood pressure  Patient will need 
canceled.   Follow nephrology recommendations   Patient however initiated on amiodarone for rhythm stabilization.  Continue heparin for stroke prophylaxis.  She has been going in and out of NSR.  ICD decision soon  Will need outpatient LORRIE for MitraClip planning if MR does not improve with GDMT    New onset cardiomyopathy  EF on echo 15 to 20%  Cath today with normal coronaries  Follow-up with cardiology recommendations  Continue GDMT with beta-blockers and Imdur/hydralazine  Patient with fluid overload which is improving with dialysis    Anemia of chronic disease  H&H stable    Hypertension  No history of hypertension  Blood pressure currently well-controlled    Hyperkalemia  Patient being dialyzed          [facility], Internal [facility]  7/26/2024 at 5:56 PM

## 2024-07-29 LAB
KAPPA LC FREE SER-MCNC: 49.5 MG/L (ref 3.3–19.4)
KAPPA LC FREE/LAMBDA FREE SER: 0.9 (ref 0.26–1.65)
LAMBDA LC FREE SERPL-MCNC: 55.3 MG/L (ref 5.7–26.3)

## 2024-07-30 LAB
COLLECT DURATION TIME UR: ABNORMAL HR
KAPPA LC FREE UR-MCNC: 84.38 MG/L (ref 1.17–86.46)
KAPPA LC FREE/LAMBDA FREE UR: 1.5 (ref 1.83–14.26)
LAMBDA LC FREE UR-MCNC: 56.23 MG/L (ref 0.27–15.21)
SPECIMEN VOL ?TM UR: ABNORMAL ML

## 2024-09-23 NOTE — PROGRESS NOTES
Bedside shift change report given to Joi ZAMBRANO (oncoming nurse) by Anders Fuentes (offgoing nurse). Report included the following information SBAR, OR Summary, Intake/Output and MAR. PT Re-evaluation     Today's date: 2024  Patient name: Ashwin Wright  : 1971  MRN: [id number]  Referring provider: Toro Healy DO  Dx:   Encounter Diagnosis     ICD-10-CM    1. S/P arthroscopy of left shoulder  Z98.890 Ambulatory Referral to Physical Therapy      2. Traumatic incomplete tear of left rotator cuff, initial encounter  S46.012A Ambulatory Referral to Physical Therapy                     Assessment  Impairments: abnormal or restricted ROM, activity intolerance, impaired physical strength and pain with function    Assessment details: Pt is a 54 y/o male who presents to physical therapy with primary nociceptive pain associated with L rotator cuff related shoulder pain in the environment of reduced thoracic mobility complicated by significant history of multiple shoulder injuries and surgeries, poor nutrition with a number of GI related issues, and liver disease. Pt was doing well prior to this weekend where he reports reinjuring his shoulder by lifting a few pyrex dishes into a cupboard overhead. Re-evaluation of measurements demonstrate pt is objectively similar to IE. Muscular guarding with PROM, strength testing limited by pain. Returning to Dr. Healy for follow-up in one week. Pt on hold until he sees ortho.     Understanding of Dx/Px/POC: good    Goals  STG (4 weeks): - NOT MET  Pt will be independent with HEP.  Pt will demonstrate increase in flexion ROM by 5-10d.  Pt will demonstrate increase in MMT grade by 1/3 for ER.    LTG (8 weeks): - NOT MET  FOTO will be expected outcome.   Pt will demonstrate painfree flexion ROM comparable to contralateral limb.  Pt will demonstrate MMT grade comparable to contralateral limb in all deficient muscle groups.      Plan: d/c      Subjective Evaluation    History of Present Illness  Mechanism of injury: Chief Complaint: Pt reports that on 24 while pulling his truck door down it got stuck. He forcefully pulled it down and it did not  move. This put a lot of pressure on his shoulder and he had pain immediately. Pt reports that since this has happened he has had significant pain in the shoulder when he goes to move it. MRI revealed a recurrent small, partial thickness articular sided tear of supraspinatus tendon fibers at the footprint with mild retraction. He also reports pain in the lateral arm at times. Some bruising as well.    (9/23): Pt reports he was doing well. However, on Saturday, he reports lifting 4 pyrex dishes overhead to put them away and felt a pop in his shoulder. He reports having excruciating pain in the shoulder following and it continues until today. States he can't lay on that side now. Reports that he hasn't been able to do much with his shoulder since.     Severity: severe  Irritability: moderate  Nature: nociceptive  Stage: subacute  Stability: no change    P1: see body chart  Patient Goals  Patient goals for therapy: decreased pain and return to work        Objective     Observations     Additional Observation Details  Bruising on the L lateral arm, no evidence of change in muscle bulk of the upper arm  Bruising of the superior L shld, tender to the touch, pt unsure of how this got there- he did report he had been doing belly flops in the pool and had gotten a number of bruises on his abdomen from this- these were visualized    Cervical/Thoracic Screen   Cervical range of motion within normal limits  Cervical range of motion within normal limits with the following exceptions: No recreation of pain    Active Range of Motion   Left Shoulder   Flexion: 95 degrees with pain  Abduction: 72 degrees with pain  External rotation 0°: WFL and with pain    Right Shoulder   Normal active range of motion    Passive Range of Motion   Left Shoulder   Flexion: 105 degrees with pain  Abduction: 60 degrees with pain  External rotation 0°: WFL and with pain    Additional Passive Range of Motion Details  Empty end feel with all PROM and  "muscular guarding today    Joint Play   Left Shoulder  Joints within functional limits are the posterior capsule.     Additional Joint Play Details  Hypomobile T1-3    Strength/Myotome Testing     Shld:  Flex:  Abd:  ER: 5/5 R; 3+/5 L  IR: 5/5 R; 3+/5    Pain limited for all MMT             Precautions:         Chronic pain disorder      Chronic pancreatitis (HCC)      Cirrhosis (HCC)  early cirrhosis    GERD (gastroesophageal reflux disease)      History of COVID-19 01/2022 mild s/s    Hyperlipidemia      Hypertension      Liver abscess      Liver disease      Meniscus tear  left knee work injury last assessed 08/24/2016    Pancreatitis      Pneumonia      Rotator cuff tear          POC expires Unit limit Auth Expiration date PT/OT/ST + Visit Limit?   9/16/24                              Visit/Unit Tracking  AUTH Status:  Date 7/22               Used 1               Remaining                    Manuals 8/28 9/4 9/9 9/16 9/23 8/12 8/14 8/19 8/21 8/26   Phys shld mob             Thoracic mob                                       Neuro Re-Ed             Banded TB shld ext       GTB 2x10      Banded TB row       BTB 2x10 High row BTB 3x15 High row BTB 3x15    pulley                          Banded ER RTB 2x15 RTB 3x12 RTB 3x12 RTB 3x12    YTB 2x10 YTB 2x10 YTB 2x15   TRX walk outs  flex/ ext             Prone T  Incline 2# 2x10  Incline 2# 2x10                      Ther Ex             UBE 3'/3' 3'/3' 3'/3' 3'/3'  3'/3' 3'/3' 3'/3' 3'/3' 3'/3'   Supine cane flex      x25 x25      Supine cane cp             PROM     TARUN        Table slide flex     20x Elevated x30 Elevated x30 BTB 2x15 BTB 2x15    OH press No weight 2x15 2# 3x10 3# 3x10 3# 3x10     No weight  2x10 No weight 2x15   Db row 10# 2x15 15# 2x8 15# 2x10 15# 2x10      10# 2x18,15   Lateral db raise  1# 2x10 2# 2x10 2# 3x10         Seated banded lat pulldown    Black TB 3x10         Table slide scap             Table slide abd             ER iso      10\"x5 10x5\"   " "   Shld iso      10\"x5       Bicep curl 5# 2x12 7# 3x10 9# 2x10 9# 3x10     5# db's 2x10    Tricep pushdown Black TB 2x12        12# 2x10    Skull crushers  5# 3x10 7# 2x8 7# 2x10         Shoulder flex c ball squeeze      2x10       Pulley's     4' 3' 3' 3' 3'    Pt edu          TARUN   Ther Activity                                       Gait Training                                       Modalities                                              "

## 2024-10-01 ENCOUNTER — TELEPHONE (OUTPATIENT)
Age: 64
End: 2024-10-01

## 2024-10-01 DIAGNOSIS — B36.9 FUNGAL INFECTION OF SKIN: Primary | ICD-10-CM

## 2024-10-02 RX ORDER — NYSTATIN 100000 U/G
CREAM TOPICAL
Qty: 30 G | Refills: 1 | Status: SHIPPED | OUTPATIENT
Start: 2024-10-02

## 2024-10-16 ENCOUNTER — HOSPITAL ENCOUNTER (OUTPATIENT)
Facility: HOSPITAL | Age: 64
Discharge: HOME | End: 2024-10-19
Payer: MEDICARE

## 2024-10-16 DIAGNOSIS — Z12.31 VISIT FOR SCREENING MAMMOGRAM: ICD-10-CM

## 2024-10-16 PROCEDURE — 77063 BREAST TOMOSYNTHESIS BI: CPT

## 2024-11-12 ENCOUNTER — HOSPITAL ENCOUNTER (OUTPATIENT)
Facility: HOSPITAL | Age: 64
Setting detail: RECURRING SERIES
Discharge: HOME | End: 2024-11-15
Payer: MEDICARE

## 2024-11-12 VITALS — HEIGHT: 63 IN | WEIGHT: 126.8 LBS | OXYGEN SATURATION: 99 % | BODY MASS INDEX: 22.47 KG/M2

## 2024-11-12 PROCEDURE — 93797 PHYS/QHP OP CAR RHAB WO ECG: CPT

## 2024-11-12 PROCEDURE — 93798 PHYS/QHP OP CAR RHAB W/ECG: CPT

## 2024-11-12 ASSESSMENT — PATIENT HEALTH QUESTIONNAIRE - PHQ9
10. IF YOU CHECKED OFF ANY PROBLEMS, HOW DIFFICULT HAVE THESE PROBLEMS MADE IT FOR YOU TO DO YOUR WORK, TAKE CARE OF THINGS AT HOME, OR GET ALONG WITH OTHER PEOPLE: NOT DIFFICULT AT ALL
6. FEELING BAD ABOUT YOURSELF - OR THAT YOU ARE A FAILURE OR HAVE LET YOURSELF OR YOUR FAMILY DOWN: NOT AT ALL
4. FEELING TIRED OR HAVING LITTLE ENERGY: NOT AT ALL
5. POOR APPETITE OR OVEREATING: NOT AT ALL
7. TROUBLE CONCENTRATING ON THINGS, SUCH AS READING THE NEWSPAPER OR WATCHING TELEVISION: NOT AT ALL
8. MOVING OR SPEAKING SO SLOWLY THAT OTHER PEOPLE COULD HAVE NOTICED. OR THE OPPOSITE, BEING SO FIGETY OR RESTLESS THAT YOU HAVE BEEN MOVING AROUND A LOT MORE THAN USUAL: NOT AT ALL
SUM OF ALL RESPONSES TO PHQ QUESTIONS 1-9: 0
3. TROUBLE FALLING OR STAYING ASLEEP: NOT AT ALL
9. THOUGHTS THAT YOU WOULD BE BETTER OFF DEAD, OR OF HURTING YOURSELF: NOT AT ALL
SUM OF ALL RESPONSES TO PHQ QUESTIONS 1-9: 0

## 2024-11-12 ASSESSMENT — EXERCISE STRESS TEST
PEAK_METS: 2.4
PEAK_BP: 121/66
PEAK_HR: 79
PEAK_RPE: 12

## 2024-11-12 ASSESSMENT — EJECTION FRACTION: EF_VALUE: 15

## 2024-12-18 ENCOUNTER — TELEMEDICINE (OUTPATIENT)
Age: 64
End: 2024-12-18

## 2024-12-18 DIAGNOSIS — Z96.0 RETAINED URETERAL STENT: Primary | ICD-10-CM

## 2024-12-18 DIAGNOSIS — G35 MULTIPLE SCLEROSIS (HCC): ICD-10-CM

## 2024-12-18 DIAGNOSIS — N31.9 NEUROGENIC BLADDER: ICD-10-CM

## 2024-12-18 DIAGNOSIS — N13.5 URETERAL STRICTURE: ICD-10-CM

## 2024-12-18 NOTE — ASSESSMENT & PLAN NOTE
She has bilateral ureteral stents since 3/5/2024.  She is overdue for stent changes.  She is now on dialysis.  We can see if we can try to manage without stents at this point.

## 2024-12-18 NOTE — ASSESSMENT & PLAN NOTE
Upper tract kinking and hydronephrosis managed with ureteral stents.  She is now on dialysis.  We will assess for further need for stenting.  We made give her a trial without a stent.

## 2024-12-18 NOTE — ASSESSMENT & PLAN NOTE
She has a history of neurogenic bladder with upper tract dilation.  She was on tamsulosin and timed voiding.

## 2024-12-18 NOTE — PROGRESS NOTES
Chief Complaint   Patient presents with    Follow-up     Discuss urethral stent        1. Have you been to the ER, urgent care clinic since your last visit?  Hospitalized since your last visit?No    2. Have you seen or consulted any other health care providers outside of the [facility] since your last visit?  Include any pap smears or colon screening. No

## 2024-12-18 NOTE — PROGRESS NOTES
Peter Alan, was evaluated through a synchronous (real-time) audio-video encounter. The patient (or guardian if applicable) is aware that this is a billable service, which includes applicable co-pays. This Virtual Visit was conducted with patient's (and/or legal guardian's) consent. The visit was conducted pursuant to the emergency declaration under the [place] Act and the National Emergencies Act, 1135 waiver authority and the Coronavirus Preparedness and Response Supplemental Appropriations Act. Patient identification was verified, and a caregiver was present when appropriate. The patient was located in a state where the provider was licensed to provide care.     I was only able to briefly connect.  We are unable to complete the visit.  We tried calling via phone however reached voicemail.    She is now on dialysis.  She has ureteral stents due to to proximal ureteral obstruction.  She is overdue for ureteral stent changes or removal.  Will try to reach out to schedule this.    ASSESSMENT and PLAN  1. Retained ureteral stent  Assessment & Plan:   She has bilateral ureteral stents since 3/5/2024.  She is overdue for stent changes.  She is now on dialysis.  We can see if we can try to manage without stents at this point.  2. Neurogenic bladder  Assessment & Plan:  She has a history of neurogenic bladder with upper tract dilation.  She was on tamsulosin and timed voiding.  3. Multiple sclerosis (HCC)  Assessment & Plan:   She has limited mobility, in a wheelchair.  4. Ureteral stricture  Assessment & Plan:  Upper tract kinking and hydronephrosis managed with ureteral stents.  She is now on dialysis.  We will assess for further need for stenting.  We will give her a trial without a stent.      Return for surgery and postop follow up.   Reno Garcia MD       Please note that portions of this note was potentially completed with Dragon dictation, the computer voice recognition software.  Quite often

## 2025-02-12 ENCOUNTER — PREP FOR PROCEDURE (OUTPATIENT)
Age: 65
End: 2025-02-12

## 2025-02-12 DIAGNOSIS — N13.5 STRICTURE OR KINKING OF URETER: ICD-10-CM

## 2025-02-12 RX ORDER — SODIUM CHLORIDE 9 MG/ML
INJECTION, SOLUTION INTRAVENOUS PRN
Status: CANCELLED | OUTPATIENT
Start: 2025-02-12

## 2025-02-12 RX ORDER — SODIUM CHLORIDE 0.9 % (FLUSH) 0.9 %
5-40 SYRINGE (ML) INJECTION PRN
Status: CANCELLED | OUTPATIENT
Start: 2025-02-12

## 2025-02-12 RX ORDER — SODIUM CHLORIDE 0.9 % (FLUSH) 0.9 %
5-40 SYRINGE (ML) INJECTION EVERY 12 HOURS SCHEDULED
Status: CANCELLED | OUTPATIENT
Start: 2025-02-12

## 2025-02-27 NOTE — PERIOP NOTE
Patient states she has Dialysis T,Th, S.   Medical clearance needed patient states she last went to [facility] 1/28/25. She also states she is scheduled for a PD catheter insertion 3/17/25 and a LORRIE 3/21/25 for follow up of watchman insertion.   [facility] called for office note [phone]  Medical clearance form faxed [phone].

## 2025-03-03 ENCOUNTER — TELEPHONE (OUTPATIENT)
Age: 65
End: 2025-03-03

## 2025-03-03 NOTE — TELEPHONE ENCOUNTER
I spoke with patient about her upcoming procedure & other things she has scheduled. She is calling her cardiologist to see if she needs to reschedule her stent change.

## 2025-03-07 ENCOUNTER — TELEPHONE (OUTPATIENT)
Age: 65
End: 2025-03-07

## 2025-03-07 NOTE — PROGRESS NOTES
Verified with Yi, surgery scheduler, patient does not need to hold Eliquis prior to procedure. Yi to verify patient knows to continue taking.

## 2025-03-21 NOTE — PROGRESS NOTES
Unable to reach patient to complete phone assessment. Several attempts made, surgery scheduled for Monday. Yi notified of the above.

## 2025-03-24 ENCOUNTER — HOSPITAL ENCOUNTER (OUTPATIENT)
Facility: HOSPITAL | Age: 65
Discharge: HOME | End: 2025-03-24
Attending: UROLOGY | Admitting: UROLOGY
Payer: MEDICARE

## 2025-03-24 ENCOUNTER — APPOINTMENT (OUTPATIENT)
Facility: HOSPITAL | Age: 65
End: 2025-03-24
Attending: UROLOGY
Payer: MEDICARE

## 2025-03-24 ENCOUNTER — ANESTHESIA EVENT (OUTPATIENT)
Facility: HOSPITAL | Age: 65
End: 2025-03-24
Payer: MEDICARE

## 2025-03-24 ENCOUNTER — ANESTHESIA (OUTPATIENT)
Facility: HOSPITAL | Age: 65
End: 2025-03-24
Payer: MEDICARE

## 2025-03-24 VITALS
RESPIRATION RATE: 18 BRPM | BODY MASS INDEX: 22.14 KG/M2 | TEMPERATURE: 97.5 F | DIASTOLIC BLOOD PRESSURE: 57 MMHG | HEART RATE: 67 BPM | WEIGHT: 125 LBS | OXYGEN SATURATION: 100 % | SYSTOLIC BLOOD PRESSURE: 115 MMHG

## 2025-03-24 LAB
ANION GAP BLD CALC-SCNC: 13
CA-I BLD-MCNC: 1.03 MMOL/L (ref 1.12–1.32)
CHLORIDE BLD-SCNC: 104 MMOL/L (ref 98–107)
CO2 BLD-SCNC: 25 MMOL/L
CREAT UR-MCNC: 4.97 MG/DL (ref 0.6–1.3)
GLUCOSE BLD STRIP.AUTO-MCNC: 74 MG/DL (ref 65–100)
POTASSIUM BLD-SCNC: 3.6 MMOL/L (ref 3.5–5.5)
SODIUM BLD-SCNC: 141 MMOL/L (ref 136–145)

## 2025-03-24 PROCEDURE — 3600000013 HC SURGERY LEVEL 3 ADDTL 15MIN: Performed by: UROLOGY

## 2025-03-24 PROCEDURE — 7100000011 HC PHASE II RECOVERY - ADDTL 15 MIN: Performed by: UROLOGY

## 2025-03-24 PROCEDURE — 6360000002 HC RX W HCPCS: Performed by: NURSE ANESTHETIST, CERTIFIED REGISTERED

## 2025-03-24 PROCEDURE — 3700000001 HC ADD 15 MINUTES (ANESTHESIA): Performed by: UROLOGY

## 2025-03-24 PROCEDURE — 6360000002 HC RX W HCPCS: Performed by: UROLOGY

## 2025-03-24 PROCEDURE — 7100000010 HC PHASE II RECOVERY - FIRST 15 MIN: Performed by: UROLOGY

## 2025-03-24 PROCEDURE — 3700000000 HC ANESTHESIA ATTENDED CARE: Performed by: UROLOGY

## 2025-03-24 PROCEDURE — 3600000003 HC SURGERY LEVEL 3 BASE: Performed by: UROLOGY

## 2025-03-24 PROCEDURE — 76000 FLUOROSCOPY <1 HR PHYS/QHP: CPT

## 2025-03-24 PROCEDURE — 7100000001 HC PACU RECOVERY - ADDTL 15 MIN: Performed by: UROLOGY

## 2025-03-24 PROCEDURE — 2500000003 HC RX 250 WO HCPCS: Performed by: UROLOGY

## 2025-03-24 PROCEDURE — C2617 STENT, NON-COR, TEM W/O DEL: HCPCS | Performed by: UROLOGY

## 2025-03-24 PROCEDURE — 80047 BASIC METABLC PNL IONIZED CA: CPT

## 2025-03-24 PROCEDURE — 2500000003 HC RX 250 WO HCPCS: Performed by: NURSE ANESTHETIST, CERTIFIED REGISTERED

## 2025-03-24 PROCEDURE — 2709999900 HC NON-CHARGEABLE SUPPLY: Performed by: UROLOGY

## 2025-03-24 PROCEDURE — 74420 UROGRAPHY RTRGR +-KUB: CPT | Performed by: UROLOGY

## 2025-03-24 PROCEDURE — 2580000003 HC RX 258: Performed by: UROLOGY

## 2025-03-24 PROCEDURE — 52332 CYSTOSCOPY AND TREATMENT: CPT | Performed by: UROLOGY

## 2025-03-24 PROCEDURE — C1769 GUIDE WIRE: HCPCS | Performed by: UROLOGY

## 2025-03-24 PROCEDURE — 7100000000 HC PACU RECOVERY - FIRST 15 MIN: Performed by: UROLOGY

## 2025-03-24 DEVICE — URETERAL STENT
Type: IMPLANTABLE DEVICE | Site: URETER | Status: FUNCTIONAL
Brand: CONTOUR™

## 2025-03-24 RX ORDER — LORAZEPAM 2 MG/ML
0.5 INJECTION INTRAMUSCULAR
Status: DISCONTINUED | OUTPATIENT
Start: 2025-03-24 | End: 2025-03-24 | Stop reason: HOSPADM

## 2025-03-24 RX ORDER — GLUCAGON 1 MG/ML
1 KIT INJECTION PRN
Status: DISCONTINUED | OUTPATIENT
Start: 2025-03-24 | End: 2025-03-24 | Stop reason: HOSPADM

## 2025-03-24 RX ORDER — DEXTROSE MONOHYDRATE 100 MG/ML
INJECTION, SOLUTION INTRAVENOUS CONTINUOUS PRN
Status: DISCONTINUED | OUTPATIENT
Start: 2025-03-24 | End: 2025-03-24 | Stop reason: HOSPADM

## 2025-03-24 RX ORDER — SODIUM CHLORIDE 9 MG/ML
INJECTION, SOLUTION INTRAVENOUS PRN
Status: DISCONTINUED | OUTPATIENT
Start: 2025-03-24 | End: 2025-03-24 | Stop reason: HOSPADM

## 2025-03-24 RX ORDER — ASPIRIN 81 MG/1
81 TABLET ORAL
COMMUNITY
Start: 2025-02-27

## 2025-03-24 RX ORDER — FENTANYL CITRATE 50 UG/ML
INJECTION, SOLUTION INTRAMUSCULAR; INTRAVENOUS
Status: DISCONTINUED | OUTPATIENT
Start: 2025-03-24 | End: 2025-03-24 | Stop reason: SDUPTHER

## 2025-03-24 RX ORDER — OXYCODONE HYDROCHLORIDE 5 MG/1
5 TABLET ORAL PRN
Status: DISCONTINUED | OUTPATIENT
Start: 2025-03-24 | End: 2025-03-24 | Stop reason: HOSPADM

## 2025-03-24 RX ORDER — NALOXONE HYDROCHLORIDE 0.4 MG/ML
INJECTION, SOLUTION INTRAMUSCULAR; INTRAVENOUS; SUBCUTANEOUS PRN
Status: DISCONTINUED | OUTPATIENT
Start: 2025-03-24 | End: 2025-03-24 | Stop reason: HOSPADM

## 2025-03-24 RX ORDER — PROPOFOL 10 MG/ML
INJECTION, EMULSION INTRAVENOUS
Status: DISCONTINUED | OUTPATIENT
Start: 2025-03-24 | End: 2025-03-24 | Stop reason: SDUPTHER

## 2025-03-24 RX ORDER — LABETALOL HYDROCHLORIDE 5 MG/ML
10 INJECTION, SOLUTION INTRAVENOUS
Status: DISCONTINUED | OUTPATIENT
Start: 2025-03-24 | End: 2025-03-24 | Stop reason: HOSPADM

## 2025-03-24 RX ORDER — ONDANSETRON 2 MG/ML
4 INJECTION INTRAMUSCULAR; INTRAVENOUS
Status: DISCONTINUED | OUTPATIENT
Start: 2025-03-24 | End: 2025-03-24 | Stop reason: HOSPADM

## 2025-03-24 RX ORDER — OXYCODONE HYDROCHLORIDE 5 MG/1
10 TABLET ORAL PRN
Status: DISCONTINUED | OUTPATIENT
Start: 2025-03-24 | End: 2025-03-24 | Stop reason: HOSPADM

## 2025-03-24 RX ORDER — SODIUM CHLORIDE 0.9 % (FLUSH) 0.9 %
5-40 SYRINGE (ML) INJECTION EVERY 12 HOURS SCHEDULED
Status: DISCONTINUED | OUTPATIENT
Start: 2025-03-24 | End: 2025-03-24 | Stop reason: HOSPADM

## 2025-03-24 RX ORDER — DIPHENHYDRAMINE HYDROCHLORIDE 50 MG/ML
12.5 INJECTION, SOLUTION INTRAMUSCULAR; INTRAVENOUS
Status: DISCONTINUED | OUTPATIENT
Start: 2025-03-24 | End: 2025-03-24 | Stop reason: HOSPADM

## 2025-03-24 RX ORDER — METOCLOPRAMIDE HYDROCHLORIDE 5 MG/ML
10 INJECTION INTRAMUSCULAR; INTRAVENOUS
Status: DISCONTINUED | OUTPATIENT
Start: 2025-03-24 | End: 2025-03-24 | Stop reason: HOSPADM

## 2025-03-24 RX ORDER — SODIUM CHLORIDE 0.9 % (FLUSH) 0.9 %
5-40 SYRINGE (ML) INJECTION PRN
Status: DISCONTINUED | OUTPATIENT
Start: 2025-03-24 | End: 2025-03-24 | Stop reason: HOSPADM

## 2025-03-24 RX ORDER — EPHEDRINE SULFATE 50 MG/ML
INJECTION INTRAVENOUS
Status: DISCONTINUED | OUTPATIENT
Start: 2025-03-24 | End: 2025-03-24 | Stop reason: SDUPTHER

## 2025-03-24 RX ORDER — FENTANYL CITRATE 0.05 MG/ML
50 INJECTION, SOLUTION INTRAMUSCULAR; INTRAVENOUS EVERY 5 MIN PRN
Status: DISCONTINUED | OUTPATIENT
Start: 2025-03-24 | End: 2025-03-24 | Stop reason: HOSPADM

## 2025-03-24 RX ORDER — PHENYLEPHRINE HCL IN 0.9% NACL 1 MG/10 ML
SYRINGE (ML) INTRAVENOUS
Status: DISCONTINUED | OUTPATIENT
Start: 2025-03-24 | End: 2025-03-24 | Stop reason: SDUPTHER

## 2025-03-24 RX ORDER — MIDAZOLAM HYDROCHLORIDE 1 MG/ML
INJECTION, SOLUTION INTRAMUSCULAR; INTRAVENOUS
Status: DISCONTINUED | OUTPATIENT
Start: 2025-03-24 | End: 2025-03-24 | Stop reason: SDUPTHER

## 2025-03-24 RX ORDER — HYDRALAZINE HYDROCHLORIDE 20 MG/ML
10 INJECTION INTRAMUSCULAR; INTRAVENOUS
Status: DISCONTINUED | OUTPATIENT
Start: 2025-03-24 | End: 2025-03-24 | Stop reason: HOSPADM

## 2025-03-24 RX ORDER — IPRATROPIUM BROMIDE AND ALBUTEROL SULFATE 2.5; .5 MG/3ML; MG/3ML
1 SOLUTION RESPIRATORY (INHALATION)
Status: DISCONTINUED | OUTPATIENT
Start: 2025-03-24 | End: 2025-03-24 | Stop reason: HOSPADM

## 2025-03-24 RX ADMIN — Medication 100 MCG: at 11:23

## 2025-03-24 RX ADMIN — MIDAZOLAM 2 MG: 1 INJECTION INTRAMUSCULAR; INTRAVENOUS at 11:00

## 2025-03-24 RX ADMIN — Medication 100 MCG: at 11:20

## 2025-03-24 RX ADMIN — CEFAZOLIN 2000 MG: 1 INJECTION, POWDER, FOR SOLUTION INTRAMUSCULAR; INTRAVENOUS at 11:05

## 2025-03-24 RX ADMIN — FENTANYL CITRATE 50 MCG: 50 INJECTION INTRAMUSCULAR; INTRAVENOUS at 11:00

## 2025-03-24 RX ADMIN — MIDAZOLAM 2 MG: 1 INJECTION INTRAMUSCULAR; INTRAVENOUS at 10:39

## 2025-03-24 RX ADMIN — SODIUM CHLORIDE: 0.9 INJECTION, SOLUTION INTRAVENOUS at 10:35

## 2025-03-24 RX ADMIN — PROPOFOL 20 MCG/KG/MIN: 10 INJECTION, EMULSION INTRAVENOUS at 11:05

## 2025-03-24 RX ADMIN — EPHEDRINE SULFATE 10 MG: 50 INJECTION INTRAVENOUS at 11:09

## 2025-03-24 RX ADMIN — Medication 100 MCG: at 11:34

## 2025-03-24 ASSESSMENT — PAIN - FUNCTIONAL ASSESSMENT
PAIN_FUNCTIONAL_ASSESSMENT: 0-10
PAIN_FUNCTIONAL_ASSESSMENT: NONE - DENIES PAIN
PAIN_FUNCTIONAL_ASSESSMENT: NONE - DENIES PAIN

## 2025-03-24 ASSESSMENT — PAIN SCALES - GENERAL
PAINLEVEL_OUTOF10: 0

## 2025-03-24 NOTE — PERIOP NOTE
Patient alert and oriented x 4 with respirations even and unlabored on RA. Dr. Garcia called to clarify when patient can resume her Eliquis. Per Dr. Garcia, her Eliquis was never ordered to be held. Patient notified she can resume all medications including her blood thinner Eliquis per Dr. Garcia's instruction; pt verbalizes understanding. Patient transported via wheelchair to front hospital entrance with patient's mother present to transport patient via private vehicle.

## 2025-03-24 NOTE — OP NOTE
Operative Note      Patient: Peter Alan  YOB: 1960  MRN: [id number]    Date of Procedure: 3/24/2025    Pre-Op Diagnosis Codes:      * Stricture or kinking of ureter [N13.5], hydronephrosis    Post-Op Diagnosis: Same       Procedure(s):  CYSTOURETHROSCOPY, BILATERAL RETROGRADE PYELOGRAM,  AND STENT CHANGE    Surgeon(s):  Reno Garcia MD    Assistant:   * No surgical staff found *    Anesthesia: General    Estimated Blood Loss (mL): Minimal    Complications: None    Specimens:   * No specimens in log *    Implants:  Implant Name Type Inv. Item Serial No.  Lot No. LRB No. Used Action   STENT URO KT DBL PGTL FLX TIP SFT SUT POS RADPQ Y 8FR 20CM - SNA  STENT URO KT DBL PGTL FLX TIP SFT SUT POS RADPQ Y 8FR 20CM NA [facility] [id number] Right 1 Implanted   STENT URO KT DBL PGTL FLX TIP SFT SUT POS RADPQ Y 8FR 20CM - SNA  STENT URO KT DBL PGTL FLX TIP SFT SUT POS RADPQ Y 8FR 20CM NA [facility] [id number] Left 1 Implanted   STENT URO DBL PGTL TAPR TIP THRD HYDR+ COAT RADPQ Y [id number] - [id number]  STENT URO DBL PGTL TAPR TIP THRD HYDR+ COAT RADPQ Y [id number]  [facility] [id number] Right 1 Explanted   STENT URO DBL PGTL TAPR TIP THRD HYDR+ COAT RADPQ Y [id number] - [id number]  STENT URO DBL PGTL TAPR TIP THRD HYDR+ COAT RADPQ Y [id number]  [employer] [facility] [id number] Left 1 Explanted         Drains: * No LDAs found *    Findings:  Infection Present At Time Of Surgery (PATOS) (choose all levels that have infection present):  No infection present  Other Findings:   Interpretation of left retrograde pyelogram: Markedly dilated left ureter and renal pelvis with dilated calyces.  Severe hydronephrosis.  Slight fold at the UPJ without a stricture.  Very slow drainage at the ureteral orifice      Interpretation of right retrograde pyelogram: Markedly dilated right ureter.  Severe hydronephrosis although less than the left side.  Focal stricture at the proximal

## 2025-03-24 NOTE — ANESTHESIA PRE PROCEDURE
Department of Anesthesiology  Preprocedure Note       Name:  Peter Alan   Age:  64 y.o.  :  1960                                          MRN:  [id number]         Date:  3/24/2025      Surgeon: Surgeon(s):  Reno Garcia MD    Procedure: Procedure(s):  CYSTOURETHROSCOPY, BILATERAL RETROGRADE PYELOGRAM, POSSIBLE BILATERAL URETEROSCOPY AND STENT CHANGE OR REMOVAL    Medications prior to admission:   Prior to Admission medications    Medication Sig Start Date End Date Taking? Authorizing Provider   Multiple Vitamins-Minerals (PRESERVISION AREDS 2 PO) Take 1 capsule by mouth Daily   Yes Shawna Gonzales MD   rosuvastatin (CRESTOR) 5 MG tablet Take 1 tablet by mouth daily   Yes Shawna Gonzales MD   isosorbide mononitrate (IMDUR) 60 MG extended release tablet Take 1 tablet by mouth daily 24  Yes Serg Haney MD   amiodarone (CORDARONE) 200 MG tablet Take 1 tablet by mouth 2 times daily  Patient taking differently: Take 1 tablet by mouth daily 7/28/24 3/21/25 Yes Serg Haney MD   carvedilol (COREG) 6.25 MG tablet Take 1 tablet by mouth 2 times daily (with meals)  Patient taking differently: Take 1 tablet by mouth daily 24  Yes Serg Haney MD   furosemide (LASIX) 80 MG tablet Take 1 tablet by mouth in the morning and 1 tablet in the evening.  Patient taking differently: Take 0.5 tablets by mouth daily 24  Yes Serg Haney MD   Fingolimod HCl (GILENYA) 0.5 MG CAPS Take by mouth Daily    Shawna Gonzales MD   docusate (COLACE, DULCOLAX) 100 MG CAPS Take 100 mg by mouth daily    Shawna Gonzales MD       Current medications:    Current Facility-Administered Medications   Medication Dose Route Frequency Provider Last Rate Last Admin    sodium chloride flush 0.9 % injection 5-40 mL  5-40 mL IntraVENous 2 times per day Reno Garcia MD        sodium chloride flush 0.9 % injection 5-40 mL  5-40 mL IntraVENous PRN Reno Garcia MD        0.9 % sodium chloride infusion

## 2025-03-24 NOTE — DISCHARGE INSTRUCTIONS
TO PREVENT AN INFECTION  WASH YOUR HANDS  To prevent infection, good handwashing is the most important thing you or your caregiver can do.  Wash your hands with soap and water or use the hand  we gave you before you touch any wounds.      2. SHOWER  Use the antibacterial soap we gave you when your surgeon says it is okay to take a shower.  Shower with this soap until your wounds are healed.  To reach all areas of your body, you may need someone to help you.  Do not forget to clean your bell button with every shower.    3. USE CLEAN SHEETS  Use freshly cleaned sheets on your bed after surgery.  To keep the surgery site clean, do not allow pets to sleep with you while your wound is still healing.    4. STOP SMOKING  Stop smoking, or at least cut back on smoking.  Smoking slows your healing.    5. CONTROL YOUR BLOOD SUGAR  High blood sugars slow wound healing.  If you are diabetic, control your blood sugar levels before and after your surgery.

## 2025-03-24 NOTE — H&P
UROLOGY HISTORY AND PHYSICAL  Reno Garcia MD  [phone] Office    Patient: Peter Alan MRN: [id number]  SSN: [id number]    YOB: 1960  Age: 64 y.o.  Sex: female          Date of Encounter:  March 24, 2025  Chief Complaint:  ureteral obstruction             History of Present Illness:  Patient is a 64 y.o. female here for surgery.    She is now on dialysis. She has ureteral stents due to to proximal ureteral obstruction. She is overdue for ureteral stent changes or removal. She is incontinent and makes a significant amount of urine in her pads.      She has a history of neurogenic bladder with upper tract dilation.  Now off tamsulosin.     Past Medical History:  Allergies   Allergen Reactions    Latex Itching    Natalizumab Hives    Interferon Beta-1a      Other reaction(s): Unknown  Reaction Type: Allergy    Interferon Beta-1b Rash and Hives     Other reaction(s): Unknown  Reaction Type: Allergy      Prior to Admission medications    Medication Sig Start Date End Date Taking? Authorizing Provider   Multiple Vitamins-Minerals (PRESERVISION AREDS 2 PO) Take 1 capsule by mouth Daily   Yes ProviderShawna MD   rosuvastatin (CRESTOR) 5 MG tablet Take 1 tablet by mouth daily   Yes ProviderShawna MD   isosorbide mononitrate (IMDUR) 60 MG extended release tablet Take 1 tablet by mouth daily 7/29/24  Yes Serg Haney MD   amiodarone (CORDARONE) 200 MG tablet Take 1 tablet by mouth 2 times daily  Patient taking differently: Take 1 tablet by mouth daily 7/28/24 3/21/25 Yes Serg Haney MD   carvedilol (COREG) 6.25 MG tablet Take 1 tablet by mouth 2 times daily (with meals)  Patient taking differently: Take 1 tablet by mouth daily 7/28/24  Yes Serg Haney MD   furosemide (LASIX) 80 MG tablet Take 1 tablet by mouth in the morning and 1 tablet in the evening.  Patient taking differently: Take 0.5 tablets by mouth daily 7/28/24  Yes Serg Haney MD   Fingolimod HCl

## 2025-03-24 NOTE — ANESTHESIA POSTPROCEDURE EVALUATION
Department of Anesthesiology  Postprocedure Note    Patient: Peter Alan  MRN: [id number]  YOB: 1960  Date of evaluation: 3/24/2025    Procedure Summary       Date: 03/24/25 Room / Location: [facility] MAIN OR 01 / [facility] MAIN OR    Anesthesia Start: 1039 Anesthesia Stop: 1143    Procedure: CYSTOURETHROSCOPY, BILATERAL RETROGRADE PYELOGRAM,  AND STENT CHANGE (Bilateral) Diagnosis:       Stricture or kinking of ureter      (Stricture or kinking of ureter [N13.5])    Surgeons: Reno Garcia MD Responsible Provider: Susy Amaya MD    Anesthesia Type: MAC ASA Status: 4            Anesthesia Type: MAC    William Phase I: William Score: 10    William Phase II: William Score: 10    Anesthesia Post Evaluation    Patient location during evaluation: PACU  Patient participation: complete - patient participated  Level of consciousness: awake  Airway patency: patent  Nausea & Vomiting: no nausea and no vomiting  Cardiovascular status: hemodynamically stable  Respiratory status: acceptable  Hydration status: euvolemic  Pain management: adequate    No notable events documented.

## 2025-04-17 PROBLEM — N13.5 STRICTURE OR KINKING OF URETER: Status: RESOLVED | Noted: 2025-02-12 | Resolved: 2025-04-17

## (undated) DEVICE — PREP PAD BNS: Brand: CONVERTORS

## (undated) DEVICE — SPONGE GZ 4X4 IN PRECUT SQ 8 PLY COTTON

## (undated) DEVICE — VINYL ACETATE UROLOGICAL DRAINAGE BAG FOR GE/OEC SYSTEMS W/ 8MM PLUG - NON-STERILE: Brand: CUSTOM MEDICAL SPECIALTIES, INC.

## (undated) DEVICE — SURGICAL PROCEDURE TRAY CRD CATH 3 PRT

## (undated) DEVICE — 48" PROBE COVER W/GEL, ULTRASOUND, STERILE: Brand: SITE-RITE

## (undated) DEVICE — PAD POSITIONING WNG STD KIT W/BODY STRP LF DISP

## (undated) DEVICE — SUT ETHLN 2-0 18IN FS BLK --

## (undated) DEVICE — CATHETER ETER URET 5FR L70CM 0038IN FLX OPN TIP NO SIDE EYE

## (undated) DEVICE — CYSTO PACK: Brand: MEDLINE INDUSTRIES, INC.

## (undated) DEVICE — INSUFFLATION NEEDLE TO ESTABLISH PNEUMOPERITONEUM.: Brand: INSUFFLATION NEEDLE

## (undated) DEVICE — SOLUTION IRRG STRL H2O 500 ML BTL 16/CA

## (undated) DEVICE — SOLUTION SCRB 4OZ 4% CHG H2O AIDED FOR PREOPERATIVE SKIN

## (undated) DEVICE — GDWIRE UROL STR 150CM FLX TP -- BX/5 SENSOR

## (undated) DEVICE — 150CM STANDARD JWIRE

## (undated) DEVICE — GDWIRE URET STR STD .038X150 -- ZIPWIRE STD

## (undated) DEVICE — GOWN,PREVENTION PLUS,XLN/XL,ST,24/CS: Brand: MEDLINE

## (undated) DEVICE — SUT CHRMC 3-0 27IN SH BRN --

## (undated) DEVICE — PINNACLE INTRODUCER SHEATH: Brand: PINNACLE

## (undated) DEVICE — REM POLYHESIVE ADULT PATIENT RETURN ELECTRODE: Brand: VALLEYLAB

## (undated) DEVICE — SPONGE LAP 18X18IN STRL -- 5/PK

## (undated) DEVICE — SURGICAL PROCEDURE PACK CYSTO CHS

## (undated) DEVICE — LAPAROSCOPIC SCISSORS: Brand: EPIX LAPAROSCOPIC SCISSORS

## (undated) DEVICE — CATHETER URET 5FR L70CM TIP 8FR OPN END CONE TIP INJ HUB

## (undated) DEVICE — GUIDEWIRE ENDOSCP L150CM DIA0.035IN TIP 3CM PTFE NIT

## (undated) DEVICE — GLOVE SURG SZ 75 L12IN FNGR THK79MIL GRN LTX FREE

## (undated) DEVICE — TUBING, SUCTION, 1/4" X 12', STRAIGHT: Brand: MEDLINE

## (undated) DEVICE — SOLUTION IRRIG 1000ML 0.9% SOD CHL USP POUR PLAS BTL

## (undated) DEVICE — TOTAL TRAY, 16FR 10ML SIL FOLEY, URN: Brand: MEDLINE

## (undated) DEVICE — SYRINGE CATH TIP 50ML

## (undated) DEVICE — SOUTHSIDE TURNOVER: Brand: MEDLINE INDUSTRIES, INC.

## (undated) DEVICE — INFLATION DEVICE: Brand: ENCORE 26

## (undated) DEVICE — SUT MONOCRYL PLUS UD 4-0 --

## (undated) DEVICE — 2, DISPOSABLE SUCTION/IRRIGATOR WITHOUT DISPOSABLE TIP: Brand: STRYKEFLOW

## (undated) DEVICE — SOLUTION IRRIG 500ML 0.9% SOD CHL USP POUR PLAS BTL

## (undated) DEVICE — SPONGE GZ W4XL4IN COT 12 PLY TYP VII WVN C FLD DSGN

## (undated) DEVICE — COLUMN DRAPE

## (undated) DEVICE — GARMENT,MEDLINE,DVT,INT,CALF,MED, GEN2: Brand: MEDLINE

## (undated) DEVICE — SOLUTION IRRIG 500ML STRL H2O NONPYROGENIC

## (undated) DEVICE — SOLUTION IRRG H2O 3000 ML USP STRL TITAN XL CONTAINER

## (undated) DEVICE — DRAPE,TOP,102X53,STERILE: Brand: MEDLINE

## (undated) DEVICE — SOLUTION IV SODIUM CHL 0.9% 1000 ML 10/CA

## (undated) DEVICE — CLIP INT L POLYMER LOK LIG HEM O LOK

## (undated) DEVICE — SUT VCRL + 2-0 27IN SH UD --

## (undated) DEVICE — BASIC SINGLE BASIN-LF: Brand: MEDLINE INDUSTRIES, INC.

## (undated) DEVICE — CONTAINER,SPECIMEN,3OZ,OR STRL: Brand: MEDLINE

## (undated) DEVICE — CONTAINER,SPECIMEN,PNEU TUBE,4OZ,OR STRL: Brand: MEDLINE

## (undated) DEVICE — SEAL UNIV 5-8MM DISP BX/10 -- DA VINCI XI - SNGL USE

## (undated) DEVICE — DERMABOND SKIN ADH 0.7ML -- DERMABOND ADVANCED 12/BX

## (undated) DEVICE — TURNOVER KIT OR CUST CMB FLD GEN LF

## (undated) DEVICE — SOLUTION IRRIG 3000ML H2O STRL BAG

## (undated) DEVICE — SOLUTION IRRIG 3000ML STRL H2O USP UROMATIC PLAS CONT

## (undated) DEVICE — PREP SKN CHLRAPRP APL 26ML STR --

## (undated) DEVICE — DRAPE EQUIP C ARM 74X42 IN MOB XR W/ TIE RUBBER BND LF

## (undated) DEVICE — BALLOON DILATATION CATHETER: Brand: UROMAX ULTRA

## (undated) DEVICE — PAD,NON-ADHERENT,2X3,STERILE,LF,1/PK: Brand: MEDLINE

## (undated) DEVICE — TUBING INSUFFLATOR HEAT HUMIDIFIED SMK EVAC SET PNEUMOCLEAR

## (undated) DEVICE — ARM DRAPE

## (undated) DEVICE — WASTEBAG DRIP/ADAPTER: Brand: MEDLINE INDUSTRIES, INC.

## (undated) DEVICE — DBD-PACK,LAVH,STERILE: Brand: MEDLINE

## (undated) DEVICE — SOL IRR STRL H2O 3000ML BG -- USE ITEM 173640

## (undated) DEVICE — DRAPE SURG W10XL20IN E OPN CIR BND BG

## (undated) DEVICE — DEVICE VASC CLSR 5FR GRY W/ INTEGR SEAL 10ML LOK SYR

## (undated) DEVICE — TOWEL SURG W17XL27IN STD BLU COT NONFENESTRATED PREWASHED

## (undated) DEVICE — DRAPE,REIN 53X77,STERILE: Brand: MEDLINE

## (undated) DEVICE — SUTURE ABSORBABLE MONOFILAMENT 4-0 RB1 27 IN UD MONOCRYL + MCP214H

## (undated) DEVICE — WET SKIN PREP TRAY: Brand: MEDLINE INDUSTRIES, INC.

## (undated) DEVICE — VISUALIZATION SYSTEM: Brand: CLEARIFY

## (undated) DEVICE — INTENDED FOR TISSUE SEPARATION, AND OTHER PROCEDURES THAT REQUIRE A SHARP SURGICAL BLADE TO PUNCTURE OR CUT.: Brand: BARD-PARKER ® CARBON RIB-BACK BLADES

## (undated) DEVICE — TROCAR: Brand: KII FIOS FIRST ENTRY

## (undated) DEVICE — COVER MPLR TIP CRV SCIS ACC DA VINCI

## (undated) DEVICE — SYR LR LCK 1ML GRAD NSAF 30ML --

## (undated) DEVICE — SOL SCRUB DYNA HEX 4% 4OZ --

## (undated) DEVICE — KIT COR DIAG CATH ANGIO 5FR CURVES JL 4.0 100CM JR 4.0

## (undated) DEVICE — DRAPE,UNDERBUTTOCKS,PCH,STERILE: Brand: MEDLINE

## (undated) DEVICE — BAG,DRAINAGE,ANTI-REFLUX TOWER,2000ML: Brand: MEDLINE

## (undated) DEVICE — TUBING SUCTION UNIV 3/16 INX20 FT W/ SCALLOPED CONN STRL

## (undated) DEVICE — BLADELESS OBTURATOR: Brand: WECK VISTA

## (undated) DEVICE — HIWIRE NITINOL CORE WIRE GUIDE ANGLED WITH HYDROPHILIC COATING STANDARD SHAFT AND 3CM FLEXIBLE TIP: Brand: HIWIRE

## (undated) DEVICE — GUIDEWIRE ENDOSCP L150CM DIA0.035IN TIP L15CM BENT PTFE

## (undated) DEVICE — LAPAROSCOPIC CHOLE PACK: Brand: MEDLINE INDUSTRIES, INC.

## (undated) DEVICE — SOLUTION SURG PREP ANTIMICROBIAL 4 OZ SKIN WND EXIDINE

## (undated) DEVICE — IRRIGATION KT PMP SGL ACT SAPS -- BX/5

## (undated) DEVICE — DRAPE SURG UTIL 26X15 IN W/ TAPE N INVASIVE MULT LAYR DISP

## (undated) DEVICE — Device: Brand: JELCO

## (undated) DEVICE — SUTURE ABSORBABLE MONOFILAMENT 4-0 RB1 27 IN VIO MONOCRYL + MCP304H

## (undated) DEVICE — MICROPUNCTURE INTRODUCER SET SILHOUETTE TRANSITIONLESS PUSH-PLUS DESIGN - STIFFENED CANNULA WITH STAINLESS STEEL WIRE GUIDE: Brand: MICROPUNCTURE